# Patient Record
Sex: FEMALE | Race: WHITE | NOT HISPANIC OR LATINO | Employment: UNEMPLOYED | URBAN - METROPOLITAN AREA
[De-identification: names, ages, dates, MRNs, and addresses within clinical notes are randomized per-mention and may not be internally consistent; named-entity substitution may affect disease eponyms.]

---

## 2017-01-12 ENCOUNTER — OFFICE VISIT (OUTPATIENT)
Dept: NEUROLOGY | Facility: CLINIC | Age: 57
End: 2017-01-12

## 2017-01-12 VITALS
BODY MASS INDEX: 39.05 KG/M2 | WEIGHT: 248.8 LBS | DIASTOLIC BLOOD PRESSURE: 74 MMHG | HEIGHT: 67 IN | HEART RATE: 76 BPM | OXYGEN SATURATION: 95 % | SYSTOLIC BLOOD PRESSURE: 122 MMHG

## 2017-01-12 DIAGNOSIS — G35 MULTIPLE SCLEROSIS (HCC): Primary | ICD-10-CM

## 2017-01-12 DIAGNOSIS — F33.41 RECURRENT MAJOR DEPRESSIVE DISORDER, IN PARTIAL REMISSION (HCC): ICD-10-CM

## 2017-01-12 PROCEDURE — 99213 OFFICE O/P EST LOW 20 MIN: CPT | Performed by: PSYCHIATRY & NEUROLOGY

## 2017-01-12 NOTE — MR AVS SNAPSHOT
Christiane Pinkd   1/12/2017 2:00 PM   Office Visit    Dept Phone:  [phone]   Encounter #:  [id number]    Provider:  Yousuf Zelaya MD   Department:  [facility] NEUROLOGY                Your Full Care Plan              Today's Medication Changes          These changes are accurate as of: 1/12/17  2:18 PM.  If you have any questions, ask your nurse or doctor.               Stop taking medication(s)listed here:     predniSONE 20 MG tablet   Commonly known as:  DELTASONE                      Your Updated Medication List          This list is accurate as of: 1/12/17  2:18 PM.  Always use your most recent med list.                * buPROPion  MG 24 hr tablet   Commonly known as:  WELLBUTRIN XL   Take 1 tablet by mouth daily.       * buPROPion  MG 24 hr tablet   Commonly known as:  WELLBUTRIN XL   Take 1 tablet by mouth every morning. Take one tablet by mouth daily.       gabapentin 400 MG capsule   Commonly known as:  NEURONTIN   Take 2 capsules by mouth 3 (three) times a day.       GILENYA 0.5 MG capsule   Generic drug:  fingolimod       metFORMIN 500 MG tablet   Commonly known as:  GLUCOPHAGE       Omeprazole 20 MG tablet delayed-release       rizatriptan 10 MG tablet   Commonly known as:  MAXALT       simvastatin 20 MG tablet   Commonly known as:  ZOCOR       valsartan-hydrochlorothiazide 160-25 MG per tablet   Commonly known as:  DIOVAN-HCT       vitamin D 76197 UNITS capsule capsule   Commonly known as:  ERGOCALCIFEROL   Take 1 capsule by mouth every 7 days. Take 1 capsule weekly.       * Notice:  This list has 2 medication(s) that are the same as other medications prescribed for you. Read the directions carefully, and ask your doctor or other care provider to review them with you.            You Were Diagnosed With        Codes Comments    Multiple sclerosis    -  Primary ICD-10-CM: G35  ICD-9-CM: 340     Recurrent major depressive disorder, in partial  "remission     ICD-10-CM: F33.41  ICD-9-CM: 296.35       Instructions     None    Patient Instructions History      Upcoming Appointments     Visit Type Date Time Department    FOLLOW UP 2017  2:00 PM [facility]      Oyster Signup     [facility] Oyster allows you to send messages to your doctor, view your test results, renew your prescriptions, schedule appointments, and more. To sign up, go to [employer] and click on the Sign Up Now link in the New User? box. Enter your Oyster Activation Code exactly as it appears below along with the last four digits of your Social Security Number and your Date of Birth () to complete the sign-up process. If you do not sign up before the expiration date, you must request a new code.    Oyster Activation Code: [email or web address]  Expires: 2017  2:18 PM    If you have questions, you can email [email or web address] or call [phone] to talk to our Oyster staff. Remember, Oyster is NOT to be used for urgent needs. For medical emergencies, dial 911.               Other Info from Your Visit           Allergies     No Known Allergies      Reason for Visit     Multiple Sclerosis           Vital Signs     Blood Pressure Pulse Height Weight Oxygen Saturation Body Mass Index    122/74 76 67\" (170.2 cm) 248 lb 12.8 oz (113 kg) 95% 38.97 kg/m2    Smoking Status                   Never Smoker           Problems and Diagnoses Noted     Depression    Multiple sclerosis        "

## 2017-01-12 NOTE — PROGRESS NOTES
"Subjective:     Patient ID: Christiane Figueroa is a 56 y.o. female.    History of Present Illness     57 yo woman with RRMS, and MDD returns in follow up.  Last visit 7/5/16 continued Gilenya.      CBC, CMP - NCS    RRMS    Notes mild cognitive changes.  Fatigue is moderate.  Walking for exercise.  Right leg weakness has improved but still present.    Did not start PT/OT/SLP at [facility].    Taking Gilenya since 7/2014.     Gabapentin helps with nighttime sx of restless legs and sharp shooting pains.    MDD    Mood stable on Wellbutrin.      The following portions of the patient's history were reviewed and updated as appropriate: allergies, current medications, past medical history, past surgical history and problem list.    Review of Systems   Constitutional: Negative for activity change and unexpected weight change.   HENT: Positive for trouble swallowing. Negative for tinnitus.    Eyes: Negative for photophobia and visual disturbance.   Respiratory: Negative for apnea and choking.    Cardiovascular: Negative for leg swelling.   Endocrine: Positive for heat intolerance. Negative for cold intolerance.   Genitourinary: Positive for frequency and urgency. Negative for difficulty urinating and menstrual problem.   Musculoskeletal: Negative for back pain and gait problem.   Skin: Negative for color change.   Allergic/Immunologic: Negative for immunocompromised state.   Neurological: Positive for tremors and light-headedness. Negative for dizziness, seizures, syncope, facial asymmetry and speech difficulty.   Hematological: Negative for adenopathy. Does not bruise/bleed easily.   Psychiatric/Behavioral: Positive for decreased concentration and dysphoric mood. Negative for behavioral problems, confusion, hallucinations and sleep disturbance.        Objective:  Vitals:    01/12/17 1351   BP: 122/74   Pulse: 76   SpO2: 95%   Weight: 248 lb 12.8 oz (113 kg)   Height: 67\" (170.2 cm)       Neurologic Exam "     Mental Status   Oriented to person, place, and time.   Follows 3 step commands.   Attention: decreased. Concentration: decreased.   Speech: speech is normal   Level of consciousness: alert  Knowledge: good and consistent with education. Unable to perform simple calculations.   Able to name object. Able to read. Able to repeat. Able to write. Normal comprehension.     Cranial Nerves     CN II   Visual fields full to confrontation.   Visual acuity: normal  Right visual field deficit: none  Left visual field deficit: none     CN III, IV, VI   Nystagmus: none   Diplopia: none  Ophthalmoparesis: none  Upgaze: normal  Downgaze: normal  Conjugate gaze: present    CN V   Facial sensation intact.   Right corneal reflex: normal  Left corneal reflex: normal    CN VII   Right facial weakness: none  Left facial weakness: none    CN VIII   Hearing: intact    CN IX, X   Palate: symmetric  Right gag reflex: normal  Left gag reflex: normal    CN XI   Right sternocleidomastoid strength: normal  Left sternocleidomastoid strength: normal    CN XII   Tongue: not atrophic  Fasciculations: absent  Tongue deviation: none    Motor Exam   Muscle bulk: normal  Overall muscle tone: increased  Right arm tone: normal  Left arm tone: normal  Right leg tone: increased  Left leg tone: increased    Strength   Strength 5/5 except as noted.   Left deltoid: 4/5  Left biceps: 4/5  Left triceps: 4/5  Left wrist flexion: 4/5  Left wrist extension: 4/5  Right iliopsoas: 4/5  Left iliopsoas: 4/5  Right quadriceps: 4/5  Left quadriceps: 4/5  Right hamstrin/5  Left hamstrin/5  Right glutei: 4/5  Left glutei: 4/5  Right anterior tibial: 4/5  Left anterior tibial: 4/5  Right posterior tibial: 4/5  Left posterior tibial: 4/5  Right peroneal: 4/5  Left peroneal: 4/5  Right gastroc: 4/5  Left gastroc: 4/5    Sensory Exam   Light touch normal.     Gait, Coordination, and Reflexes     Gait  Gait: shuffling and wide-based    Tremor   Resting tremor:  absent  Intention tremor: present  Action tremor: left arm and right arm      Physical Exam   Constitutional: She is oriented to person, place, and time.   Neurological: She is oriented to person, place, and time.   Psychiatric: Her speech is normal.       Assessment/Plan:       Problems Addressed this Visit        Nervous and Auditory    Multiple sclerosis - Primary     Stable on Gilenya                 Other    Depression     Psychological condition is improving with treatment.  Continue current treatment regimen.  Psychological condition  will be reassessed at the next regular appointment.

## 2017-01-12 NOTE — ASSESSMENT & PLAN NOTE
Psychological condition is improving with treatment.  Continue current treatment regimen.  Psychological condition  will be reassessed at the next regular appointment.

## 2017-02-08 RX ORDER — ERGOCALCIFEROL 1.25 MG/1
50000 CAPSULE ORAL
Qty: 12 CAPSULE | Refills: 1 | Status: SHIPPED | OUTPATIENT
Start: 2017-02-08 | End: 2017-08-10 | Stop reason: SDUPTHER

## 2017-07-21 ENCOUNTER — OFFICE VISIT (OUTPATIENT)
Dept: NEUROLOGY | Facility: CLINIC | Age: 57
End: 2017-07-21

## 2017-07-21 ENCOUNTER — APPOINTMENT (OUTPATIENT)
Dept: LAB | Facility: HOSPITAL | Age: 57
End: 2017-07-21

## 2017-07-21 VITALS
OXYGEN SATURATION: 97 % | SYSTOLIC BLOOD PRESSURE: 124 MMHG | WEIGHT: 230.6 LBS | HEIGHT: 67 IN | BODY MASS INDEX: 36.19 KG/M2 | HEART RATE: 66 BPM | DIASTOLIC BLOOD PRESSURE: 72 MMHG

## 2017-07-21 DIAGNOSIS — F33.41 RECURRENT MAJOR DEPRESSIVE DISORDER, IN PARTIAL REMISSION (HCC): ICD-10-CM

## 2017-07-21 DIAGNOSIS — G35 MULTIPLE SCLEROSIS (HCC): Primary | ICD-10-CM

## 2017-07-21 PROCEDURE — 99214 OFFICE O/P EST MOD 30 MIN: CPT | Performed by: PSYCHIATRY & NEUROLOGY

## 2017-07-21 RX ORDER — GABAPENTIN 400 MG/1
400 CAPSULE ORAL 3 TIMES DAILY
COMMUNITY
End: 2017-07-21 | Stop reason: SDUPTHER

## 2017-07-21 RX ORDER — BUPROPION HYDROCHLORIDE 150 MG/1
150 TABLET ORAL EVERY MORNING
Qty: 30 TABLET | Refills: 11 | Status: SHIPPED | OUTPATIENT
Start: 2017-07-21 | End: 2017-11-07 | Stop reason: SDUPTHER

## 2017-07-21 RX ORDER — GABAPENTIN 400 MG/1
400 CAPSULE ORAL 3 TIMES DAILY
Qty: 90 CAPSULE | Refills: 5 | Status: SHIPPED | OUTPATIENT
Start: 2017-07-21 | End: 2018-03-06

## 2017-07-21 RX ORDER — BUPROPION HYDROCHLORIDE 300 MG/1
300 TABLET ORAL DAILY
Qty: 30 TABLET | Refills: 11 | Status: SHIPPED | OUTPATIENT
Start: 2017-07-21 | End: 2017-11-07 | Stop reason: SDUPTHER

## 2017-07-21 NOTE — ASSESSMENT & PLAN NOTE
Right sided weakness and unsteady gait is worsening.  Recommend starting PT    MRI Brain   CBC, CMP   mg TID

## 2017-07-21 NOTE — PROGRESS NOTES
Subjective:     Patient ID: Christiane Figueroa is a 57 y.o. female.    History of Present Illness     57 y.o. woman with RRMS, and MDD returns in follow up.  Last visit 1/12/17 continued Gilenya.      MRI Brain/cervical - 7/5/16 - right frontal, right centrum semiovale PVWM, scattered T2 lesions no change from 3/14/13; C5/6 DDD    CBC, CMP - NCS    RRMS    MSFC reveals decrease in 9 hole peg on right and right LE weakness.  Several falls in last 6 months.  Did not start PT.  Fatigue is severe.  Heat intolerance is severe.    Lost 18 lbs by avoiding carbs.  Requires frequent breaks every 15 minutes if up on feet.      Taking Gilenya since 7/2014.    Legs are aching and hurting at night.    MDD    Mood is good on Wellbutrin.      The following portions of the patient's history were reviewed and updated as appropriate: allergies, current medications, past medical history, past surgical history and problem list.    Review of Systems   Constitutional: Negative for activity change and unexpected weight change.   HENT: Positive for trouble swallowing. Negative for tinnitus.    Eyes: Negative for photophobia and visual disturbance.   Respiratory: Negative for apnea and choking.    Cardiovascular: Negative for leg swelling.   Endocrine: Positive for heat intolerance. Negative for cold intolerance.   Genitourinary: Positive for frequency and urgency. Negative for difficulty urinating and menstrual problem.   Musculoskeletal: Negative for back pain and gait problem.   Skin: Negative for color change.   Allergic/Immunologic: Negative for immunocompromised state.   Neurological: Positive for tremors and light-headedness. Negative for dizziness, seizures, syncope, facial asymmetry and speech difficulty.   Hematological: Negative for adenopathy. Does not bruise/bleed easily.   Psychiatric/Behavioral: Positive for decreased concentration and dysphoric mood. Negative for behavioral problems, confusion, hallucinations and sleep  "disturbance.        Objective:  Vitals:    07/21/17 1110   BP: 124/72   BP Location: Right arm   Patient Position: Sitting   Cuff Size: Adult   Pulse: 66   SpO2: 97%   Weight: 230 lb 9.6 oz (105 kg)   Height: 67\" (170.2 cm)       Neurologic Exam     Mental Status   Oriented to person, place, and time.   Follows 3 step commands.   Attention: decreased. Concentration: decreased.   Speech: speech is normal   Level of consciousness: alert  Knowledge: good and consistent with education. Unable to perform simple calculations.   Able to name object. Able to read. Able to repeat. Able to write. Normal comprehension.     Cranial Nerves     CN II   Visual fields full to confrontation.   Visual acuity: normal  Right visual field deficit: none  Left visual field deficit: none     CN III, IV, VI   Nystagmus: none   Diplopia: none  Ophthalmoparesis: none  Upgaze: normal  Downgaze: normal  Conjugate gaze: present    CN V   Facial sensation intact.   Right corneal reflex: normal  Left corneal reflex: normal    CN VII   Right facial weakness: none  Left facial weakness: none    CN VIII   Hearing: intact    CN IX, X   Palate: symmetric  Right gag reflex: normal  Left gag reflex: normal    CN XI   Right sternocleidomastoid strength: normal  Left sternocleidomastoid strength: normal    CN XII   Tongue: not atrophic  Fasciculations: absent  Tongue deviation: none    Motor Exam   Muscle bulk: normal  Overall muscle tone: increased  Right arm tone: normal  Left arm tone: normal  Right leg tone: increased  Left leg tone: increased    Strength   Strength 5/5 throughout.   Right neck flexion: 5/5  Left neck flexion: 5/5  Right neck extension: 5/5  Left neck extension: 5/5  Right deltoid: 5/5  Left deltoid: 5/5  Right biceps: 5/5  Left biceps: 5/5  Right triceps: 5/5  Left triceps: 5/5  Right wrist flexion: 5/5  Left wrist flexion: 5/5  Right wrist extension: 5/5  Left wrist extension: 5/5  Right interossei: 5/5  Left interossei: 5/5  Right " abdominals: 5/5  Left abdominals: 5/5  Right iliopsoas: 4/5  Left iliopsoas: 5/5  Right quadriceps: 4/5  Left quadriceps: 5/5  Right hamstrin/5  Left hamstrin/5  Right glutei: 4/5  Left glutei: 5/5  Right anterior tibial: 4/5  Left anterior tibial: 5/5  Right posterior tibial: 4/5  Left posterior tibial: 5/5  Right peroneal: 4/5  Left peroneal: 5/5  Right gastroc: 4/5  Left gastroc: 5/5    Sensory Exam   Light touch normal.     Gait, Coordination, and Reflexes     Gait  Gait: shuffling and wide-based    Tremor   Resting tremor: absent  Intention tremor: present  Action tremor: left arm and right arm      Physical Exam   Constitutional: She is oriented to person, place, and time.   Neurological: She is oriented to person, place, and time. She has normal strength.   Psychiatric: Her speech is normal.       Assessment/Plan:       Problems Addressed this Visit        Nervous and Auditory    Multiple sclerosis - Primary     Right sided weakness and unsteady gait is worsening.  Recommend starting PT    MRI Brain   CBC, CMP   mg TID         Relevant Orders    MRI Brain With & Without Contrast    CBC & Differential    Comprehensive Metabolic Panel       Other    Depression     Psychological condition is unchanged.  Continue current treatment regimen.  Psychological condition  will be reassessed at the next regular appointment.         Relevant Medications    buPROPion XL (WELLBUTRIN XL) 150 MG 24 hr tablet    buPROPion XL (WELLBUTRIN XL) 300 MG 24 hr tablet

## 2017-08-02 LAB
ALBUMIN SERPL-MCNC: 4.4 G/DL (ref 3.2–4.8)
ALBUMIN/GLOB SERPL: 1.6 G/DL
ALP SERPL-CCNC: 79 U/L (ref 25–100)
ALT SERPL-CCNC: 35 U/L (ref 7–40)
AST SERPL-CCNC: 28 U/L (ref 0–33)
BASOPHILS # BLD AUTO: 0.02 10E3/MM3 (ref 0–0.2)
BASOPHILS NFR BLD AUTO: 0.6 % (ref 0–1)
BILIRUB SERPL-MCNC: 0.3 MG/DL (ref 0.3–1.2)
BUN SERPL-MCNC: 12 MG/DL (ref 9–23)
BUN/CREAT SERPL: 20 (ref 7–25)
CALCIUM SERPL-MCNC: 10.8 MG/DL (ref 8.7–10.4)
CHLORIDE SERPL-SCNC: 100 MMOL/L (ref 99–109)
CO2 SERPL-SCNC: 26 MMOL/L (ref 20–31)
CREAT SERPL-MCNC: 0.6 MG/DL (ref 0.6–1.3)
EOSINOPHIL # BLD AUTO: 0.12 10E3/MM3 (ref 0.1–0.3)
EOSINOPHIL NFR BLD AUTO: 3.7 % (ref 0–3)
ERYTHROCYTE [DISTWIDTH] IN BLOOD BY AUTOMATED COUNT: 13.3 % (ref 11.3–14.5)
GLOBULIN SER CALC-MCNC: 2.8 G/DL
GLUCOSE SERPL-MCNC: 116 MG/DL (ref 70–100)
HCT VFR BLD AUTO: 41.5 % (ref 34.5–44)
HGB BLD-MCNC: 13.3 G/DL (ref 11.5–15.5)
IMM GRANULOCYTES # BLD: 0.01 10E3/MM3 (ref 0–0.03)
IMM GRANULOCYTES NFR BLD: 0.3 % (ref 0–0.6)
LYMPHOCYTES # BLD AUTO: 0.31 10E3/MM3 (ref 0.6–4.8)
LYMPHOCYTES NFR BLD AUTO: 9.7 % (ref 24–44)
MCH RBC QN AUTO: 30.4 PG (ref 27–31)
MCHC RBC AUTO-ENTMCNC: 32 G/DL (ref 32–36)
MCV RBC AUTO: 95 FL (ref 80–99)
MONOCYTES # BLD AUTO: 0.38 10E3/MM3 (ref 0–1)
MONOCYTES NFR BLD AUTO: 11.8 % (ref 0–12)
NEUTROPHILS # BLD AUTO: 2.37 10E3/MM3 (ref 1.5–8.3)
NEUTROPHILS NFR BLD AUTO: 73.9 % (ref 41–71)
PLATELET # BLD AUTO: 242 10E3/MM3 (ref 150–450)
POTASSIUM SERPL-SCNC: 3.8 MMOL/L (ref 3.5–5.5)
PROT SERPL-MCNC: 7.2 G/DL (ref 5.7–8.2)
RBC # BLD AUTO: 4.37 10E6/MM3 (ref 3.89–5.14)
SODIUM SERPL-SCNC: 137 MMOL/L (ref 132–146)
WBC # BLD AUTO: 3.21 10E3/MM3 (ref 3.5–10.8)

## 2017-08-07 ENCOUNTER — TELEPHONE (OUTPATIENT)
Dept: NEUROLOGY | Facility: CLINIC | Age: 57
End: 2017-08-07

## 2017-08-07 NOTE — TELEPHONE ENCOUNTER
----- Message from Edel Munguia sent at 8/7/2017  9:29 AM EDT -----  Regarding: script request  Contact: [phone]  Patient request script to help her relax during MRI

## 2017-08-11 RX ORDER — ERGOCALCIFEROL 1.25 MG/1
CAPSULE ORAL
Qty: 12 CAPSULE | Refills: 0 | Status: SHIPPED | OUTPATIENT
Start: 2017-08-11 | End: 2017-11-07 | Stop reason: SDUPTHER

## 2017-08-31 ENCOUNTER — OFFICE VISIT (OUTPATIENT)
Dept: NEUROLOGY | Facility: CLINIC | Age: 57
End: 2017-08-31

## 2017-08-31 ENCOUNTER — HOSPITAL ENCOUNTER (OUTPATIENT)
Dept: MRI IMAGING | Facility: HOSPITAL | Age: 57
Discharge: HOME | End: 2017-08-31
Attending: PSYCHIATRY & NEUROLOGY | Admitting: PSYCHIATRY & NEUROLOGY

## 2017-08-31 VITALS
SYSTOLIC BLOOD PRESSURE: 130 MMHG | OXYGEN SATURATION: 99 % | DIASTOLIC BLOOD PRESSURE: 78 MMHG | HEART RATE: 71 BPM | WEIGHT: 232 LBS | HEIGHT: 67 IN | BODY MASS INDEX: 36.41 KG/M2

## 2017-08-31 DIAGNOSIS — F33.41 RECURRENT MAJOR DEPRESSIVE DISORDER, IN PARTIAL REMISSION (HCC): ICD-10-CM

## 2017-08-31 DIAGNOSIS — G35 MULTIPLE SCLEROSIS (HCC): Primary | ICD-10-CM

## 2017-08-31 PROCEDURE — A9577 INJ MULTIHANCE: HCPCS | Performed by: PSYCHIATRY & NEUROLOGY

## 2017-08-31 PROCEDURE — 0 GADOBENATE DIMEGLUMINE 529 MG/ML SOLUTION: Performed by: PSYCHIATRY & NEUROLOGY

## 2017-08-31 PROCEDURE — 70553 MRI BRAIN STEM W/O & W/DYE: CPT

## 2017-08-31 PROCEDURE — 99214 OFFICE O/P EST MOD 30 MIN: CPT | Performed by: PSYCHIATRY & NEUROLOGY

## 2017-08-31 RX ORDER — LAMOTRIGINE 25 MG/1
TABLET ORAL
Qty: 120 TABLET | Refills: 2 | Status: SHIPPED | OUTPATIENT
Start: 2017-08-31 | End: 2018-03-06

## 2017-08-31 RX ADMIN — GADOBENATE DIMEGLUMINE 20 ML: 529 INJECTION, SOLUTION INTRAVENOUS at 09:58

## 2017-11-07 RX ORDER — BUPROPION HYDROCHLORIDE 150 MG/1
150 TABLET ORAL EVERY MORNING
Qty: 90 TABLET | Refills: 3 | Status: SHIPPED | OUTPATIENT
Start: 2017-11-07 | End: 2019-06-11 | Stop reason: SDUPTHER

## 2017-11-07 RX ORDER — ERGOCALCIFEROL 1.25 MG/1
50000 CAPSULE ORAL
Qty: 36 CAPSULE | Refills: 2 | Status: SHIPPED | OUTPATIENT
Start: 2017-11-07 | End: 2018-12-04 | Stop reason: SDUPTHER

## 2017-11-07 RX ORDER — BUPROPION HYDROCHLORIDE 300 MG/1
300 TABLET ORAL DAILY
Qty: 90 TABLET | Refills: 3 | Status: SHIPPED | OUTPATIENT
Start: 2017-11-07 | End: 2019-06-11 | Stop reason: SDUPTHER

## 2018-03-06 ENCOUNTER — LAB REQUISITION (OUTPATIENT)
Dept: LAB | Facility: HOSPITAL | Age: 58
End: 2018-03-06

## 2018-03-06 ENCOUNTER — OFFICE VISIT (OUTPATIENT)
Dept: NEUROLOGY | Facility: CLINIC | Age: 58
End: 2018-03-06

## 2018-03-06 ENCOUNTER — APPOINTMENT (OUTPATIENT)
Dept: LAB | Facility: HOSPITAL | Age: 58
End: 2018-03-06

## 2018-03-06 VITALS
DIASTOLIC BLOOD PRESSURE: 94 MMHG | RESPIRATION RATE: 16 BRPM | OXYGEN SATURATION: 98 % | WEIGHT: 236 LBS | HEART RATE: 75 BPM | SYSTOLIC BLOOD PRESSURE: 160 MMHG | BODY MASS INDEX: 37.04 KG/M2 | HEIGHT: 67 IN

## 2018-03-06 DIAGNOSIS — G35 MULTIPLE SCLEROSIS (HCC): Primary | ICD-10-CM

## 2018-03-06 DIAGNOSIS — F33.41 RECURRENT MAJOR DEPRESSIVE DISORDER, IN PARTIAL REMISSION (HCC): ICD-10-CM

## 2018-03-06 DIAGNOSIS — N32.81 OAB (OVERACTIVE BLADDER): ICD-10-CM

## 2018-03-06 DIAGNOSIS — Z00.00 ROUTINE GENERAL MEDICAL EXAMINATION AT A HEALTH CARE FACILITY: ICD-10-CM

## 2018-03-06 LAB
ALBUMIN SERPL-MCNC: 4.7 G/DL (ref 3.2–4.8)
ALBUMIN SERPL-MCNC: 4.7 G/DL (ref 3.2–4.8)
ALBUMIN/GLOB SERPL: 1.7 G/DL
ALBUMIN/GLOB SERPL: 1.7 G/DL (ref 1.5–2.5)
ALP SERPL-CCNC: 69 U/L (ref 25–100)
ALP SERPL-CCNC: 69 U/L (ref 25–100)
ALT SERPL W P-5'-P-CCNC: 26 U/L (ref 7–40)
ALT SERPL-CCNC: 26 U/L (ref 7–40)
ANION GAP SERPL CALCULATED.3IONS-SCNC: 9 MMOL/L (ref 3–11)
AST SERPL-CCNC: 23 U/L (ref 0–33)
AST SERPL-CCNC: 23 U/L (ref 0–33)
BASOPHILS # BLD AUTO: 0.02 10*3/MM3 (ref 0–0.2)
BASOPHILS # BLD AUTO: 0.02 10E3/MM3 (ref 0–0.2)
BASOPHILS NFR BLD AUTO: 0.4 % (ref 0–1)
BASOPHILS NFR BLD AUTO: 0.4 % (ref 0–1)
BILIRUB SERPL-MCNC: 0.2 MG/DL (ref 0.3–1.2)
BILIRUB SERPL-MCNC: 0.2 MG/DL (ref 0.3–1.2)
BUN BLD-MCNC: 15 MG/DL (ref 9–23)
BUN SERPL-MCNC: 15 MG/DL (ref 9–23)
BUN/CREAT SERPL: 21.4 (ref 7–25)
BUN/CREAT SERPL: 21.4 (ref 7–25)
CALCIUM SERPL-MCNC: 9.7 MG/DL (ref 8.7–10.4)
CALCIUM SPEC-SCNC: 9.7 MG/DL (ref 8.7–10.4)
CHLORIDE SERPL-SCNC: 102 MMOL/L (ref 99–109)
CHLORIDE SERPL-SCNC: 102 MMOL/L (ref 99–109)
CO2 SERPL-SCNC: 30 MMOL/L (ref 20–31)
CO2 SERPL-SCNC: 30 MMOL/L (ref 20–31)
CREAT BLD-MCNC: 0.7 MG/DL (ref 0.6–1.3)
CREAT SERPL-MCNC: 0.7 MG/DL (ref 0.6–1.3)
DEPRECATED RDW RBC AUTO: 41.2 FL (ref 37–54)
EOSINOPHIL # BLD AUTO: 0.15 10*3/MM3 (ref 0–0.3)
EOSINOPHIL # BLD AUTO: 0.15 10E3/MM3 (ref 0.1–0.3)
EOSINOPHIL NFR BLD AUTO: 3.3 % (ref 0–3)
EOSINOPHIL NFR BLD AUTO: 3.3 % (ref 0–3)
ERYTHROCYTE [DISTWIDTH] IN BLOOD BY AUTOMATED COUNT: 12.8 % (ref 11.3–14.5)
ERYTHROCYTE [DISTWIDTH] IN BLOOD BY AUTOMATED COUNT: 12.8 % (ref 11.3–14.5)
GFR SERPL CREATININE-BSD FRML MDRD: 86 ML/MIN/1.73
GFR SERPLBLD CREATININE-BSD FMLA CKD-EPI: 86 ML/MIN/1.732
GLOBULIN SER CALC-MCNC: 2.8 G/DL
GLOBULIN UR ELPH-MCNC: 2.8 GM/DL
GLUCOSE BLD-MCNC: 129 MG/DL (ref 70–100)
GLUCOSE SERPL-MCNC: 129 MG/DL (ref 70–100)
HCT VFR BLD AUTO: 41.6 % (ref 34.5–44)
HCT VFR BLD AUTO: 41.6 % (ref 34.5–44)
HGB BLD-MCNC: 14.2 G/DL (ref 11.5–15.5)
HGB BLD-MCNC: 14.2 G/DL (ref 11.5–15.5)
IMM GRANULOCYTES # BLD: 0.01 10*3/MM3 (ref 0–0.03)
IMM GRANULOCYTES # BLD: 0.01 10E3/MM3 (ref 0–0.03)
IMM GRANULOCYTES NFR BLD: 0.2 % (ref 0–0.6)
IMM GRANULOCYTES NFR BLD: 0.2 % (ref 0–0.6)
LYMPHOCYTES # BLD AUTO: 0.19 10*3/MM3 (ref 0.6–4.8)
LYMPHOCYTES # BLD AUTO: 0.19 10E3/MM3 (ref 0.6–4.8)
LYMPHOCYTES NFR BLD AUTO: 4.2 % (ref 24–44)
LYMPHOCYTES NFR BLD AUTO: 4.2 % (ref 24–44)
MCH RBC QN AUTO: 30.5 PG (ref 27–31)
MCH RBC QN AUTO: 30.5 PG (ref 27–31)
MCHC RBC AUTO-ENTMCNC: 34.1 G/DL (ref 32–36)
MCHC RBC AUTO-ENTMCNC: 34.1 G/DL (ref 32–36)
MCV RBC AUTO: 89.5 FL (ref 80–99)
MCV RBC AUTO: 89.5 FL (ref 80–99)
MONOCYTES # BLD AUTO: 0.63 10*3/MM3 (ref 0–1)
MONOCYTES # BLD AUTO: 0.63 10E3/MM3 (ref 0–1)
MONOCYTES NFR BLD AUTO: 14.1 % (ref 0–12)
MONOCYTES NFR BLD AUTO: 14.1 % (ref 0–12)
NEUTROPHILS # BLD AUTO: 3.48 10*3/MM3 (ref 1.5–8.3)
NEUTROPHILS # BLD AUTO: 3.48 10E3/MM3 (ref 1.5–8.3)
NEUTROPHILS NFR BLD AUTO: 77.8 % (ref 41–71)
NEUTROPHILS NFR BLD AUTO: 77.8 % (ref 41–71)
PLATELET # BLD AUTO: 248 10*3/MM3 (ref 150–450)
PLATELET # BLD AUTO: 248 10E3/MM3 (ref 150–450)
PMV BLD AUTO: 11.6 FL (ref 6–12)
POTASSIUM BLD-SCNC: 4 MMOL/L (ref 3.5–5.5)
POTASSIUM SERPL-SCNC: 4 MMOL/L (ref 3.5–5.5)
PROT SERPL-MCNC: 7.5 G/DL (ref 5.7–8.2)
PROT SERPL-MCNC: 7.5 G/DL (ref 5.7–8.2)
RBC # BLD AUTO: 4.65 10*6/MM3 (ref 3.89–5.14)
RBC # BLD AUTO: 4.65 10E6/MM3 (ref 3.89–5.14)
SODIUM BLD-SCNC: 141 MMOL/L (ref 132–146)
SODIUM SERPL-SCNC: 141 MMOL/L (ref 132–146)
WBC # BLD AUTO: 4.48 10E3/MM3 (ref 3.5–10.8)
WBC NRBC COR # BLD: 4.48 10*3/MM3 (ref 3.5–10.8)

## 2018-03-06 PROCEDURE — 99214 OFFICE O/P EST MOD 30 MIN: CPT | Performed by: PSYCHIATRY & NEUROLOGY

## 2018-03-06 PROCEDURE — 36415 COLL VENOUS BLD VENIPUNCTURE: CPT | Performed by: PSYCHIATRY & NEUROLOGY

## 2018-03-06 PROCEDURE — 85025 COMPLETE CBC W/AUTO DIFF WBC: CPT | Performed by: PSYCHIATRY & NEUROLOGY

## 2018-03-06 PROCEDURE — 80053 COMPREHEN METABOLIC PANEL: CPT | Performed by: PSYCHIATRY & NEUROLOGY

## 2018-03-06 RX ORDER — GABAPENTIN 300 MG/1
600 CAPSULE ORAL 3 TIMES DAILY
Qty: 180 CAPSULE | Refills: 5 | Status: SHIPPED | OUTPATIENT
Start: 2018-03-06 | End: 2019-10-04 | Stop reason: SDUPTHER

## 2018-03-06 RX ORDER — MIRTAZAPINE 30 MG/1
30 TABLET, FILM COATED ORAL NIGHTLY
Qty: 30 TABLET | Refills: 2 | Status: SHIPPED | OUTPATIENT
Start: 2018-03-06 | End: 2019-03-06

## 2018-03-06 RX ORDER — OXYBUTYNIN CHLORIDE 10 MG/1
10 TABLET, EXTENDED RELEASE ORAL DAILY
Qty: 30 TABLET | Refills: 3 | Status: SHIPPED | OUTPATIENT
Start: 2018-03-06 | End: 2018-08-10

## 2018-03-06 NOTE — PROGRESS NOTES
Subjective:   Chief Complaint   Patient presents with   • Multiple Sclerosis       Patient ID: Christiane Figueroa is a 57 y.o. female.    History of Present Illness        57 y.o. woman with RRMS, and MDD returns in follow up.  Last visit 8/31/17 continued Gilenya, started LTG.     MRI Brain, 8/31/17 - no new/enlarging/enhancing lesions since 6/17/16;  right frontal, right centrum semiovale PVWM, scattered T2 lesions      7/21/17:  CBC, CMP - NCS    RRMS    Fatigue is severe.  Requires frequent breaks every 15 minutes if up on feet.    Right side of face felt hot and swollen.      Taking Gilenya since 7/2014.    Legs continue aching and hurting at night.   mg TID of modest benefit.     MDD    Starting in [place] has had trouble sleeping and staying in house.  Anxious about leaving house.  Trial of LTG for a month but stopped due to upset stomach.         The following portions of the patient's history were reviewed and updated as appropriate: allergies, current medications, past medical history, past surgical history and problem list.    Review of Systems   Constitutional: Negative for activity change and unexpected weight change.   HENT: Positive for trouble swallowing. Negative for tinnitus.    Eyes: Negative for photophobia and visual disturbance.   Respiratory: Negative for apnea and choking.    Cardiovascular: Negative for leg swelling.   Endocrine: Positive for heat intolerance. Negative for cold intolerance.   Genitourinary: Positive for frequency and urgency. Negative for difficulty urinating and menstrual problem.   Musculoskeletal: Negative for back pain and gait problem.   Skin: Negative for color change.   Allergic/Immunologic: Negative for immunocompromised state.   Neurological: Positive for tremors and light-headedness. Negative for dizziness, seizures, syncope, facial asymmetry and speech difficulty.   Hematological: Negative for adenopathy. Does not bruise/bleed easily.  "  Psychiatric/Behavioral: Positive for decreased concentration and dysphoric mood. Negative for behavioral problems, confusion, hallucinations and sleep disturbance.        Objective:  Vitals:    03/06/18 1130   BP: 160/94   BP Location: Right arm   Patient Position: Sitting   Cuff Size: Adult   Pulse: 75   Resp: 16   SpO2: 98%   Weight: 107 kg (236 lb)   Height: 170.2 cm (67\")       Neurologic Exam     Mental Status   Oriented to person, place, and time.   Follows 3 step commands.   Attention: decreased. Concentration: decreased.   Speech: speech is normal   Level of consciousness: alert  Knowledge: good and consistent with education. Unable to perform simple calculations.   Able to name object. Able to read. Able to repeat. Able to write. Normal comprehension.     Cranial Nerves     CN II   Visual fields full to confrontation.   Visual acuity: normal  Right visual field deficit: none  Left visual field deficit: none     CN III, IV, VI   Nystagmus: none   Diplopia: none  Ophthalmoparesis: none  Upgaze: normal  Downgaze: normal  Conjugate gaze: present    CN V   Facial sensation intact.   Right corneal reflex: normal  Left corneal reflex: normal    CN VII   Right facial weakness: none  Left facial weakness: none    CN VIII   Hearing: intact    CN IX, X   Palate: symmetric  Right gag reflex: normal  Left gag reflex: normal    CN XI   Right sternocleidomastoid strength: normal  Left sternocleidomastoid strength: normal    CN XII   Tongue: not atrophic  Fasciculations: absent  Tongue deviation: none    Motor Exam   Muscle bulk: normal  Overall muscle tone: increased  Right arm tone: normal  Left arm tone: normal  Right leg tone: increased  Left leg tone: increased    Strength   Strength 5/5 throughout.   Right neck flexion: 5/5  Left neck flexion: 5/5  Right neck extension: 5/5  Left neck extension: 5/5  Right deltoid: 5/5  Left deltoid: 5/5  Right biceps: 5/5  Left biceps: 5/5  Right triceps: 5/5  Left triceps: " 5/5  Right wrist flexion: 5/5  Left wrist flexion: 5/5  Right wrist extension: 5/5  Left wrist extension: 5/5  Right interossei: 5/5  Left interossei: 5/5  Right abdominals: 5/5  Left abdominals: 5/5  Right iliopsoas: 4/5  Left iliopsoas: 5/5  Right quadriceps: 4/5  Left quadriceps: 5/5  Right hamstrin/5  Left hamstrin/5  Right glutei: 4/5  Left glutei: 5/5  Right anterior tibial: 4/5  Left anterior tibial: 5/5  Right posterior tibial: 4/5  Left posterior tibial: 5/5  Right peroneal: 4/5  Left peroneal: 5/5  Right gastroc: 4/5  Left gastroc: 5/5    Sensory Exam   Light touch normal.     Gait, Coordination, and Reflexes     Gait  Gait: shuffling and wide-based    Tremor   Resting tremor: absent  Intention tremor: present  Action tremor: left arm and right arm      Physical Exam   Constitutional: She is oriented to person, place, and time.   Neurological: She is oriented to person, place, and time. She has normal strength.   Psychiatric: Her speech is normal.     Lab Requisition on 2017   Component Date Value Ref Range Status   • Glucose 2017 116* 70 - 100 mg/dL Final   • BUN 2017 12  9 - 23 mg/dL Final   • Creatinine 2017 0.60  0.60 - 1.30 mg/dL Final   • Sodium 2017 137  132 - 146 mmol/L Final   • Potassium 2017 3.8  3.5 - 5.5 mmol/L Final   • Chloride 2017 100  99 - 109 mmol/L Final   • CO2 2017 26.0  20.0 - 31.0 mmol/L Final   • Calcium 2017 10.8* 8.7 - 10.4 mg/dL Final   • Total Protein 2017 7.2  5.7 - 8.2 g/dL Final   • Albumin 2017 4.40  3.20 - 4.80 g/dL Final   • ALT (SGPT) 2017 35  7 - 40 U/L Final   • AST (SGOT) 2017 28  0 - 33 U/L Final   • Alkaline Phosphatase 2017 79  25 - 100 U/L Final   • Total Bilirubin 2017 0.3  0.3 - 1.2 mg/dL Final   • eGFR Non African Amer 2017 103  >60 mL/min/1.73 Final   • Globulin 2017 2.8  gm/dL Final   • A/G Ratio 2017 1.6  1.5 - 2.5 g/dL Final   • BUN/Creatinine  Ratio 07/21/2017 20.0  7.0 - 25.0 Final   • Anion Gap 07/21/2017 11.0  3.0 - 11.0 mmol/L Final   • WBC 07/21/2017 3.21* 3.50 - 10.80 10*3/mm3 Final   • RBC 07/21/2017 4.37  3.89 - 5.14 10*6/mm3 Final   • Hemoglobin 07/21/2017 13.3  11.5 - 15.5 g/dL Final   • Hematocrit 07/21/2017 41.5  34.5 - 44.0 % Final   • MCV 07/21/2017 95.0  80.0 - 99.0 fL Final   • MCH 07/21/2017 30.4  27.0 - 31.0 pg Final   • MCHC 07/21/2017 32.0  32.0 - 36.0 g/dL Final   • RDW 07/21/2017 13.3  11.3 - 14.5 % Final   • RDW-SD 07/21/2017 46.5  37.0 - 54.0 fl Final   • MPV 07/21/2017 11.6  6.0 - 12.0 fL Final   • Platelets 07/21/2017 242  150 - 450 10*3/mm3 Final   • Neutrophil % 07/21/2017 73.9* 41.0 - 71.0 % Final   • Lymphocyte % 07/21/2017 9.7* 24.0 - 44.0 % Final   • Monocyte % 07/21/2017 11.8  0.0 - 12.0 % Final   • Eosinophil % 07/21/2017 3.7* 0.0 - 3.0 % Final   • Basophil % 07/21/2017 0.6  0.0 - 1.0 % Final   • Immature Grans % 07/21/2017 0.3  0.0 - 0.6 % Final   • Neutrophils, Absolute 07/21/2017 2.37  1.50 - 8.30 10*3/mm3 Final   • Lymphocytes, Absolute 07/21/2017 0.31* 0.60 - 4.80 10*3/mm3 Final   • Monocytes, Absolute 07/21/2017 0.38  0.00 - 1.00 10*3/mm3 Final   • Eosinophils, Absolute 07/21/2017 0.12  0.00 - 0.30 10*3/mm3 Final   • Basophils, Absolute 07/21/2017 0.02  0.00 - 0.20 10*3/mm3 Final   • Immature Grans, Absolute 07/21/2017 0.01  0.00 - 0.03 10*3/mm3 Final       Assessment/Plan:       Problems Addressed this Visit        Nervous and Auditory    Multiple sclerosis - Primary     Stable sx on Gilenya    CBC,CMP   Increase  mg TID for worsening leg pains         Relevant Orders    CBC & Differential    Comprehensive Metabolic Panel       Musculoskeletal and Integument    OAB (overactive bladder)     Start Oxybutynin          Relevant Medications    oxybutynin XL (DITROPAN XL) 10 MG 24 hr tablet       Other    Depression     Psychological condition is worsening.  Medication changes per orders.  Psychological condition   will be reassessed at the next regular appointment     Debilitating anxiety    Continue Welbutrin and add Remeron .         Relevant Medications    mirtazapine (REMERON) 30 MG tablet

## 2018-06-26 ENCOUNTER — OFFICE VISIT (OUTPATIENT)
Dept: NEUROLOGY | Facility: CLINIC | Age: 58
End: 2018-06-26

## 2018-06-26 ENCOUNTER — LAB REQUISITION (OUTPATIENT)
Dept: LAB | Facility: HOSPITAL | Age: 58
End: 2018-06-26

## 2018-06-26 VITALS
RESPIRATION RATE: 18 BRPM | DIASTOLIC BLOOD PRESSURE: 90 MMHG | SYSTOLIC BLOOD PRESSURE: 144 MMHG | OXYGEN SATURATION: 99 % | HEIGHT: 67 IN | WEIGHT: 242 LBS | BODY MASS INDEX: 37.98 KG/M2 | HEART RATE: 74 BPM

## 2018-06-26 DIAGNOSIS — G35 MULTIPLE SCLEROSIS (HCC): Primary | ICD-10-CM

## 2018-06-26 DIAGNOSIS — N32.81 OAB (OVERACTIVE BLADDER): ICD-10-CM

## 2018-06-26 DIAGNOSIS — Z00.00 ROUTINE GENERAL MEDICAL EXAMINATION AT A HEALTH CARE FACILITY: ICD-10-CM

## 2018-06-26 DIAGNOSIS — F33.41 RECURRENT MAJOR DEPRESSIVE DISORDER, IN PARTIAL REMISSION (HCC): ICD-10-CM

## 2018-06-26 PROCEDURE — 36415 COLL VENOUS BLD VENIPUNCTURE: CPT | Performed by: PSYCHIATRY & NEUROLOGY

## 2018-06-26 PROCEDURE — 99214 OFFICE O/P EST MOD 30 MIN: CPT | Performed by: PSYCHIATRY & NEUROLOGY

## 2018-06-26 NOTE — PROGRESS NOTES
Subjective:   Chief Complaint   Patient presents with   • Multiple Sclerosis       Patient ID: Christiane Figueroa is a 58 y.o. female.    History of Present Illness      58 y.o. woman with RRMS, OAB and MDD returns in follow up.  Last visit 3/6/18 continued Gilenya, increased GBP, started oxybutynin and remeron, ordered labs.    MRI Brain, 8/31/17 - no new/enlarging/enhancing lesions since 6/17/16;  right frontal, right centrum semiovale PVWM, scattered T2 lesions      3/7/18:  CBC, CMP - NCS    RRMS    Eye appt discovered OS macular edema.  Denies visual changes.  Stopped Gilenya on 6/14/18.  Leg pains are controlled and decreased  mg TID. Requires frequent breaks every 15 minutes if up on feet.  Right side of face felt hot and swollen.  Right hip aches and hurts.     Previous DMD:  Rebif, Tecfidera. Gileya.    Legs continue aching and hurting at night.   mg TID of modest benefit.     MDD    Started on Remeron and able to go outside of house.  Going on vacation in July.  Mood is stable.  Continues on Wellbutrin 450 mg daily, Remeron.        OAB    Taking oxybutynin improves frequency and urgency.      Past Medical History:   Diagnosis Date   • Anxiety    • Arthritis    • Depression    • Diabetes mellitus    • Fatigue    • Hypertension    • Limb swelling     BLE, left leg worse   • LOM (loss of memory)    • Multiple sclerosis    • Pain, joint, shoulder     Bilateral   • Vision problems        The following portions of the patient's history were reviewed and updated as appropriate: allergies, current medications, past medical history, past surgical history and problem list.    Review of Systems   Constitutional: Negative for activity change and unexpected weight change.   HENT: Positive for trouble swallowing. Negative for tinnitus.    Eyes: Negative for photophobia and visual disturbance.   Respiratory: Negative for apnea and choking.    Cardiovascular: Negative for leg swelling.   Endocrine: Positive for  "heat intolerance. Negative for cold intolerance.   Genitourinary: Positive for frequency and urgency. Negative for difficulty urinating and menstrual problem.   Musculoskeletal: Negative for back pain and gait problem.   Skin: Negative for color change.   Allergic/Immunologic: Negative for immunocompromised state.   Neurological: Positive for tremors and light-headedness. Negative for dizziness, seizures, syncope, facial asymmetry and speech difficulty.   Hematological: Negative for adenopathy. Does not bruise/bleed easily.   Psychiatric/Behavioral: Positive for decreased concentration and dysphoric mood. Negative for behavioral problems, confusion, hallucinations and sleep disturbance.        Objective:  Vitals:    06/26/18 1037   BP: 144/90   BP Location: Right arm   Patient Position: Sitting   Cuff Size: Adult   Pulse: 74   Resp: 18   SpO2: 99%   Weight: 110 kg (242 lb)   Height: 170.2 cm (67\")       Neurologic Exam     Mental Status   Oriented to person, place, and time.   Follows 3 step commands.   Attention: decreased. Concentration: decreased.   Speech: speech is normal   Level of consciousness: alert  Knowledge: good and consistent with education. Unable to perform simple calculations.   Able to name object. Able to read. Able to repeat. Able to write. Normal comprehension.     Cranial Nerves     CN II   Visual fields full to confrontation.   Visual acuity: normal  Right visual field deficit: none  Left visual field deficit: none     CN III, IV, VI   Nystagmus: none   Diplopia: none  Ophthalmoparesis: none  Upgaze: normal  Downgaze: normal  Conjugate gaze: present    CN V   Facial sensation intact.   Right corneal reflex: normal  Left corneal reflex: normal    CN VII   Right facial weakness: none  Left facial weakness: none    CN VIII   Hearing: intact    CN IX, X   Palate: symmetric  Right gag reflex: normal  Left gag reflex: normal    CN XI   Right sternocleidomastoid strength: normal  Left " sternocleidomastoid strength: normal    CN XII   Tongue: not atrophic  Fasciculations: absent  Tongue deviation: none    Motor Exam   Muscle bulk: normal  Overall muscle tone: increased  Right arm tone: normal  Left arm tone: normal  Right leg tone: increased  Left leg tone: increased    Strength   Strength 5/5 throughout.   Right neck flexion: 5/5  Left neck flexion: 5/5  Right neck extension: 5/5  Left neck extension: 5/5  Right deltoid: 5/5  Left deltoid: 5/5  Right biceps: 5/5  Left biceps: 5/5  Right triceps: 5/5  Left triceps: 5/5  Right wrist flexion: 5/5  Left wrist flexion: 5/5  Right wrist extension: 5/5  Left wrist extension: 5/5  Right interossei: 5/5  Left interossei: 5/5  Right abdominals: 5/5  Left abdominals: 5/5  Right iliopsoas: 4/5  Left iliopsoas: 5/5  Right quadriceps: 4/5  Left quadriceps: 5/5  Right hamstrin/5  Left hamstrin/5  Right glutei: 4/5  Left glutei: 5/5  Right anterior tibial: 4/5  Left anterior tibial: 5/5  Right posterior tibial: 4/5  Left posterior tibial: 5/5  Right peroneal: 4/5  Left peroneal: 5/5  Right gastroc: 4/5  Left gastroc: 5/5    Sensory Exam   Light touch normal.     Gait, Coordination, and Reflexes     Gait  Gait: shuffling and wide-based    Tremor   Resting tremor: absent  Intention tremor: present  Action tremor: left arm and right arm      Physical Exam   Constitutional: She is oriented to person, place, and time.   Neurological: She is oriented to person, place, and time. She has normal strength.   Psychiatric: Her speech is normal.     Orders Only on 2018   Component Date Value Ref Range Status   • WBC 2018 4.48  3.50 - 10.80 10E3/mm3 Final   • RBC 2018 4.65  3.89 - 5.14 10E6/mm3 Final   • Hemoglobin 2018 14.2  11.5 - 15.5 g/dL Final   • Hematocrit 2018 41.6  34.5 - 44.0 % Final   • MCV 2018 89.5  80.0 - 99.0 fL Final   • MCH 2018 30.5  27.0 - 31.0 pg Final   • MCHC 2018 34.1  32.0 - 36.0 g/dL Final   • RDW  03/06/2018 12.8  11.3 - 14.5 % Final   • Platelets 03/06/2018 248  150 - 450 10E3/mm3 Final   • Neutrophil Rel % 03/06/2018 77.8* 41.0 - 71.0 % Final   • Lymphocyte Rel % 03/06/2018 4.2* 24.0 - 44.0 % Final   • Monocyte Rel % 03/06/2018 14.1* 0.0 - 12.0 % Final   • Eosinophil Rel % 03/06/2018 3.3* 0.0 - 3.0 % Final   • Basophil Rel % 03/06/2018 0.4  0.0 - 1.0 % Final   • Neutrophils Absolute 03/06/2018 3.48  1.50 - 8.30 10E3/mm3 Final   • Lymphocytes Absolute 03/06/2018 0.19* 0.60 - 4.80 10E3/mm3 Final   • Monocytes Absolute 03/06/2018 0.63  0.00 - 1.00 10E3/mm3 Final   • Eosinophils Absolute 03/06/2018 0.15  0.10 - 0.30 10E3/mm3 Final   • Basophils Absolute 03/06/2018 0.02  0.00 - 0.20 10E3/mm3 Final   • Immature Granulocyte Rel % 03/06/2018 0.2  0.0 - 0.6 % Final   • Immature Grans Absolute 03/06/2018 0.01  0.00 - 0.03 10E3/mm3 Final   • Glucose 03/06/2018 129* 70 - 100 mg/dL Final   • BUN 03/06/2018 15  9 - 23 mg/dL Final   • Creatinine 03/06/2018 0.70  0.60 - 1.30 mg/dL Final   • eGFR Non  Am 03/06/2018 86  mL/min/1.732 Final    Comment:                                 --------------------------------------                                  [employer] Guidelines                                  Stage      Description           GFR                                  1          Normal or High        90+                                  2          Mild decrease         60-89                                  3          Moderate decrease     30-59                                  4          Severe decrease       15-29                                  5          Kidney failure        <15     • BUN/Creatinine Ratio 03/06/2018 21.4  7.0 - 25.0 Final   • Sodium 03/06/2018 141  132 - 146 mmol/L Final   • Potassium 03/06/2018 4.0  3.5 - 5.5 mmol/L Final   • Chloride 03/06/2018 102  99 - 109 mmol/L Final   • Total CO2 03/06/2018 30.0  20.0 - 31.0 mmol/L Final   • Calcium 03/06/2018 9.7  8.7 - 10.4 mg/dL  Final   • Total Protein 03/06/2018 7.5  5.7 - 8.2 g/dL Final   • Albumin 03/06/2018 4.7  3.2 - 4.8 g/dL Final   • Globulin 03/06/2018 2.8  g/dL Final   • A/G Ratio 03/06/2018 1.7  g/dL Final   • Total Bilirubin 03/06/2018 0.2* 0.3 - 1.2 mg/dL Final   • Alkaline Phosphatase 03/06/2018 69  25 - 100 U/L Final   • AST (SGOT) 03/06/2018 23  0 - 33 U/L Final   • ALT (SGPT) 03/06/2018 26  7 - 40 U/L Final       Assessment/Plan:       Problems Addressed this Visit        Nervous and Auditory    Multiple sclerosis - Primary     Developed macular edema on Gilenya    Stopped Gilenya    Start Aubagio    MRI Brain and labs          Relevant Orders    CBC & Differential    Comprehensive Metabolic Panel    QuantiFERON TB Client Incubated    MRI Brain Without Contrast       Musculoskeletal and Integument    OAB (overactive bladder)     Sx improved on oxybutynin             Other    Depression     Psychological condition is improving with treatment.  Continue current treatment regimen.  Psychological condition  will be reassessed at the next regular appointment.

## 2018-07-01 ENCOUNTER — RESULTS ENCOUNTER (OUTPATIENT)
Dept: NEUROLOGY | Facility: CLINIC | Age: 58
End: 2018-07-01

## 2018-07-01 DIAGNOSIS — G35 MULTIPLE SCLEROSIS (HCC): ICD-10-CM

## 2018-07-01 LAB
ALBUMIN SERPL-MCNC: 4.7 G/DL (ref 3.5–5.5)
ALBUMIN/GLOB SERPL: 2 {RATIO} (ref 1.2–2.2)
ALP SERPL-CCNC: 69 IU/L (ref 39–117)
ALT SERPL-CCNC: 25 IU/L (ref 0–32)
ANNOTATION COMMENT IMP: ABNORMAL
AST SERPL-CCNC: 23 IU/L (ref 0–40)
BASOPHILS # BLD AUTO: 0 X10E3/UL (ref 0–0.2)
BASOPHILS NFR BLD AUTO: 1 %
BILIRUB SERPL-MCNC: <0.2 MG/DL (ref 0–1.2)
BUN SERPL-MCNC: 12 MG/DL (ref 6–24)
BUN/CREAT SERPL: 16 (ref 9–23)
CALCIUM SERPL-MCNC: 9.7 MG/DL (ref 8.7–10.2)
CHLORIDE SERPL-SCNC: 98 MMOL/L (ref 96–106)
CO2 SERPL-SCNC: 27 MMOL/L (ref 20–29)
CREAT SERPL-MCNC: 0.75 MG/DL (ref 0.57–1)
EOSINOPHIL # BLD AUTO: 0.2 X10E3/UL (ref 0–0.4)
EOSINOPHIL NFR BLD AUTO: 3 %
ERYTHROCYTE [DISTWIDTH] IN BLOOD BY AUTOMATED COUNT: 13.6 % (ref 12.3–15.4)
GAMMA INTERFERON BACKGROUND BLD IA-ACNC: 0.03 IU/ML
GLOBULIN SER CALC-MCNC: 2.4 G/DL (ref 1.5–4.5)
GLUCOSE SERPL-MCNC: 130 MG/DL (ref 65–99)
HCT VFR BLD AUTO: 41.2 % (ref 34–46.6)
HGB BLD-MCNC: 14 G/DL (ref 11.1–15.9)
IMM GRANULOCYTES # BLD: 0 X10E3/UL (ref 0–0.1)
IMM GRANULOCYTES NFR BLD: 0 %
LYMPHOCYTES # BLD AUTO: 0.6 X10E3/UL (ref 0.7–3.1)
LYMPHOCYTES NFR BLD AUTO: 12 %
Lab: NORMAL
M TB IFN-G BLD-IMP: ABNORMAL
M TB IFN-G CD4+ BCKGRND COR BLD-ACNC: <0 IU/ML
M TB IFN-G CD4+ T-CELLS BLD-ACNC: 0.02 IU/ML
MCH RBC QN AUTO: 30.9 PG (ref 26.6–33)
MCHC RBC AUTO-ENTMCNC: 34 G/DL (ref 31.5–35.7)
MCV RBC AUTO: 91 FL (ref 79–97)
MITOGEN IGNF BLD-ACNC: 0.07 IU/ML
MONOCYTES # BLD AUTO: 0.3 X10E3/UL (ref 0.1–0.9)
MONOCYTES NFR BLD AUTO: 6 %
NEUTROPHILS # BLD AUTO: 3.7 X10E3/UL (ref 1.4–7)
NEUTROPHILS NFR BLD AUTO: 78 %
PLATELET # BLD AUTO: 251 X10E3/UL (ref 150–379)
POTASSIUM SERPL-SCNC: 3.9 MMOL/L (ref 3.5–5.2)
PROT SERPL-MCNC: 7.1 G/DL (ref 6–8.5)
RBC # BLD AUTO: 4.53 X10E6/UL (ref 3.77–5.28)
SERVICE CMNT-IMP: ABNORMAL
SODIUM SERPL-SCNC: 142 MMOL/L (ref 134–144)
WBC # BLD AUTO: 4.7 X10E3/UL (ref 3.4–10.8)

## 2018-07-20 ENCOUNTER — TELEPHONE (OUTPATIENT)
Dept: NEUROLOGY | Facility: CLINIC | Age: 58
End: 2018-07-20

## 2018-07-20 ENCOUNTER — APPOINTMENT (OUTPATIENT)
Dept: MRI IMAGING | Facility: HOSPITAL | Age: 58
End: 2018-07-20
Attending: PSYCHIATRY & NEUROLOGY

## 2018-07-20 NOTE — TELEPHONE ENCOUNTER
----- Message from Edel Munguia sent at 7/20/2018  3:00 PM EDT -----  Regarding: MEDICATION REQUEST  Contact: [phone]  STEFANO VARGAS    Please send in valim to help patient real during MRI

## 2018-07-23 ENCOUNTER — APPOINTMENT (OUTPATIENT)
Dept: MRI IMAGING | Facility: HOSPITAL | Age: 58
End: 2018-07-23
Attending: PSYCHIATRY & NEUROLOGY

## 2018-07-23 ENCOUNTER — HOSPITAL ENCOUNTER (OUTPATIENT)
Dept: MRI IMAGING | Facility: HOSPITAL | Age: 58
Discharge: HOME | End: 2018-07-23
Attending: PSYCHIATRY & NEUROLOGY | Admitting: PSYCHIATRY & NEUROLOGY

## 2018-07-23 DIAGNOSIS — G35 MULTIPLE SCLEROSIS (HCC): ICD-10-CM

## 2018-07-23 PROCEDURE — 70551 MRI BRAIN STEM W/O DYE: CPT

## 2018-07-23 RX ORDER — ALPRAZOLAM 1 MG/1
TABLET ORAL
Qty: 2 TABLET | Refills: 0 | Status: SHIPPED | OUTPATIENT
Start: 2018-07-23 | End: 2019-04-12

## 2018-07-24 NOTE — TELEPHONE ENCOUNTER
Called patient, spoke with the patient, patient is aware of the medication being sent to the pharmacy. Patient stated that she had found an old script with 1 pill in the bottle and took that medication. Stated that it did then trick and was able to get the MRI completed. I apologized to the patient about the delay.

## 2018-07-25 ENCOUNTER — APPOINTMENT (OUTPATIENT)
Dept: MRI IMAGING | Facility: HOSPITAL | Age: 58
End: 2018-07-25
Attending: PSYCHIATRY & NEUROLOGY

## 2018-08-10 ENCOUNTER — LAB REQUISITION (OUTPATIENT)
Dept: LAB | Facility: HOSPITAL | Age: 58
End: 2018-08-10

## 2018-08-10 ENCOUNTER — OFFICE VISIT (OUTPATIENT)
Dept: NEUROLOGY | Facility: CLINIC | Age: 58
End: 2018-08-10

## 2018-08-10 VITALS
HEIGHT: 67 IN | SYSTOLIC BLOOD PRESSURE: 138 MMHG | DIASTOLIC BLOOD PRESSURE: 86 MMHG | HEART RATE: 86 BPM | OXYGEN SATURATION: 98 %

## 2018-08-10 DIAGNOSIS — Z00.00 ROUTINE GENERAL MEDICAL EXAMINATION AT A HEALTH CARE FACILITY: ICD-10-CM

## 2018-08-10 DIAGNOSIS — F33.41 RECURRENT MAJOR DEPRESSIVE DISORDER, IN PARTIAL REMISSION (HCC): ICD-10-CM

## 2018-08-10 DIAGNOSIS — G35 MULTIPLE SCLEROSIS (HCC): Primary | ICD-10-CM

## 2018-08-10 DIAGNOSIS — N32.81 OAB (OVERACTIVE BLADDER): ICD-10-CM

## 2018-08-10 PROCEDURE — 99214 OFFICE O/P EST MOD 30 MIN: CPT | Performed by: PSYCHIATRY & NEUROLOGY

## 2018-08-10 PROCEDURE — 36415 COLL VENOUS BLD VENIPUNCTURE: CPT | Performed by: PSYCHIATRY & NEUROLOGY

## 2018-08-10 RX ORDER — LOSARTAN POTASSIUM AND HYDROCHLOROTHIAZIDE 12.5; 5 MG/1; MG/1
1 TABLET ORAL DAILY
COMMUNITY
End: 2020-08-21

## 2018-08-10 NOTE — PROGRESS NOTES
Subjective:   Chief Complaint   Patient presents with   • Multiple Sclerosis     mri f/u       Patient ID: Christiane Figueroa is a 58 y.o. female.    History of Present Illness      58 y.o. woman with RRMS, OAB and MDD returns in follow up.  Last visit 6/26/18 stopped Gilenya, started Aubagio, ordered MRI Brain and labs.    MRI Brain, my review of films, 7/23/18 as compared to 8/31/17 - no new/enlarging/enhancing lesions since 6/17/16;  right frontal, right centrum semiovale PVWM, scattered T2 lesions      6/28/18:  CBC, CMP, TB - NCS    RRMS     Increased energy after starting on Aubagio.  Fatigue is mild to moderate.      Leg pains are controlled on  mg TID.  Requires frequent breaks every 15 minutes if up on feet.  Right side of face felt hot and swollen.  Right hip aches and hurts.     Previous DMD:  Rebif, Tecfidera. Gilenya(macular edema).     MDD     Remeron and Wellbutrin 450 mg daily improved mood.        OAB     Stopped oxybutynin as not controlling frequency and urgency.      Past Medical History:   Diagnosis Date   • Anxiety    • Arthritis    • Depression    • Diabetes mellitus (CMS/HCC)    • Fatigue    • Hypertension    • Limb swelling     BLE, left leg worse   • LOM (loss of memory)    • Multiple sclerosis (CMS/HCC)    • Pain, joint, shoulder     Bilateral   • Vision problems        The following portions of the patient's history were reviewed and updated as appropriate: allergies, current medications, past medical history, past surgical history and problem list.    Review of Systems   Constitutional: Positive for fatigue. Negative for activity change and unexpected weight change.   HENT: Positive for trouble swallowing. Negative for tinnitus.    Eyes: Negative for photophobia and visual disturbance.   Respiratory: Negative for apnea and choking.    Cardiovascular: Negative for leg swelling.   Endocrine: Positive for heat intolerance. Negative for cold intolerance.   Genitourinary: Positive for  "frequency and urgency. Negative for difficulty urinating and menstrual problem.   Musculoskeletal: Negative for back pain and gait problem.   Skin: Negative for color change.   Allergic/Immunologic: Negative for immunocompromised state.   Neurological: Positive for tremors and light-headedness. Negative for dizziness, seizures, syncope, facial asymmetry and speech difficulty.   Hematological: Negative for adenopathy. Does not bruise/bleed easily.   Psychiatric/Behavioral: Positive for decreased concentration and dysphoric mood. Negative for behavioral problems, confusion, hallucinations and sleep disturbance.        Objective:  Vitals:    08/10/18 1427   BP: 138/86   Pulse: 86   SpO2: 98%   Height: 170.2 cm (67\")       Neurologic Exam     Mental Status   Oriented to person, place, and time.   Follows 3 step commands.   Attention: decreased. Concentration: decreased.   Speech: speech is normal   Level of consciousness: alert  Knowledge: good and consistent with education. Unable to perform simple calculations.   Able to name object. Able to read. Able to repeat. Able to write. Normal comprehension.     Cranial Nerves     CN II   Visual fields full to confrontation.   Visual acuity: normal  Right visual field deficit: none  Left visual field deficit: none     CN III, IV, VI   Nystagmus: none   Diplopia: none  Ophthalmoparesis: none  Upgaze: normal  Downgaze: normal  Conjugate gaze: present    CN V   Facial sensation intact.   Right corneal reflex: normal  Left corneal reflex: normal    CN VII   Right facial weakness: none  Left facial weakness: none    CN VIII   Hearing: intact    CN IX, X   Palate: symmetric  Right gag reflex: normal  Left gag reflex: normal    CN XI   Right sternocleidomastoid strength: normal  Left sternocleidomastoid strength: normal    CN XII   Tongue: not atrophic  Fasciculations: absent  Tongue deviation: none    Motor Exam   Muscle bulk: normal  Overall muscle tone: increased  Right arm tone: " normal  Left arm tone: normal  Right leg tone: increased  Left leg tone: increased    Strength   Strength 5/5 throughout.   Right neck flexion: 5/5  Left neck flexion: 5/5  Right neck extension: 5/5  Left neck extension: 5/5  Right deltoid: 5/5  Left deltoid: 5/5  Right biceps: 5/5  Left biceps: 5/5  Right triceps: 5/5  Left triceps: 5/5  Right wrist flexion: 5/5  Left wrist flexion: 5/5  Right wrist extension: 5/5  Left wrist extension: 5/5  Right interossei: 5/5  Left interossei: 5/5  Right abdominals: 5/5  Left abdominals: 5/5  Right iliopsoas: 4/5  Left iliopsoas: 5/5  Right quadriceps: 4/5  Left quadriceps: 5/5  Right hamstrin/5  Left hamstrin/5  Right glutei: 4/5  Left glutei: 5/5  Right anterior tibial: 4/5  Left anterior tibial: 5/5  Right posterior tibial: 4/5  Left posterior tibial: 5/5  Right peroneal: 4/5  Left peroneal: 5/5  Right gastroc: 4/5  Left gastroc: 5/5    Sensory Exam   Light touch normal.     Gait, Coordination, and Reflexes     Gait  Gait: shuffling and wide-based    Tremor   Resting tremor: absent  Intention tremor: present  Action tremor: left arm and right arm      Physical Exam   Constitutional: She is oriented to person, place, and time.   Neurological: She is oriented to person, place, and time. She has normal strength.   Psychiatric: Her speech is normal.     Orders Only on 2018   Component Date Value Ref Range Status   • WBC 2018 4.7  3.4 - 10.8 x10E3/uL Final   • RBC 2018 4.53  3.77 - 5.28 x10E6/uL Final   • Hemoglobin 2018 14.0  11.1 - 15.9 g/dL Final   • Hematocrit 2018 41.2  34.0 - 46.6 % Final   • MCV 2018 91  79 - 97 fL Final   • MCH 2018 30.9  26.6 - 33.0 pg Final   • MCHC 2018 34.0  31.5 - 35.7 g/dL Final   • RDW 2018 13.6  12.3 - 15.4 % Final   • Platelets 2018 251  150 - 379 x10E3/uL Final   • Neutrophil Rel % 2018 78  Not Estab. % Final   • Lymphocyte Rel % 2018 12  Not Estab. % Final   •  Monocyte Rel % 06/27/2018 6  Not Estab. % Final   • Eosinophil Rel % 06/27/2018 3  Not Estab. % Final   • Basophil Rel % 06/27/2018 1  Not Estab. % Final   • Neutrophils Absolute 06/27/2018 3.7  1.4 - 7.0 x10E3/uL Final   • Lymphocytes Absolute 06/27/2018 0.6* 0.7 - 3.1 x10E3/uL Final   • Monocytes Absolute 06/27/2018 0.3  0.1 - 0.9 x10E3/uL Final   • Eosinophils Absolute 06/27/2018 0.2  0.0 - 0.4 x10E3/uL Final   • Basophils Absolute 06/27/2018 0.0  0.0 - 0.2 x10E3/uL Final   • Immature Granulocyte Rel % 06/27/2018 0  Not Estab. % Final   • Immature Grans Absolute 06/27/2018 0.0  0.0 - 0.1 x10E3/uL Final   • Glucose 06/27/2018 130* 65 - 99 mg/dL Final   • BUN 06/27/2018 12  6 - 24 mg/dL Final   • Creatinine 06/27/2018 0.75  0.57 - 1.00 mg/dL Final   • eGFR Non  Am 06/27/2018 88  >59 mL/min/1.73 Final   • eGFR African Am 06/27/2018 101  >59 mL/min/1.73 Final   • BUN/Creatinine Ratio 06/27/2018 16  9 - 23 Final   • Sodium 06/27/2018 142  134 - 144 mmol/L Final   • Potassium 06/27/2018 3.9  3.5 - 5.2 mmol/L Final   • Chloride 06/27/2018 98  96 - 106 mmol/L Final   • Total CO2 06/27/2018 27  20 - 29 mmol/L Final                  **Please note reference interval change**   • Calcium 06/27/2018 9.7  8.7 - 10.2 mg/dL Final   • Total Protein 06/27/2018 7.1  6.0 - 8.5 g/dL Final   • Albumin 06/27/2018 4.7  3.5 - 5.5 g/dL Final   • Globulin 06/27/2018 2.4  1.5 - 4.5 g/dL Final   • A/G Ratio 06/27/2018 2.0  1.2 - 2.2 Final   • Total Bilirubin 06/27/2018 <0.2  0.0 - 1.2 mg/dL Final   • Alkaline Phosphatase 06/27/2018 69  39 - 117 IU/L Final   • AST (SGOT) 06/27/2018 23  0 - 40 IU/L Final   • ALT (SGPT) 06/27/2018 25  0 - 32 IU/L Final   • QuantiFERON-TB Gold In Tube 06/27/2018 Indeterminate* Negative Final    Comment: Mitogen (positive control) gave low response.  This may indicate anergy or immune suppression. Early draws and  extended transit time may also result in low positive control and  indeterminate  results.  The specimen received for QuantiFERON testing was incubated by the  ordering institution.  Specific procedures outlined in our Directory  of Services and in the package insert for the QuantiFERON Gold  (In Tube) test must be followed to enable for proper stimulation of  cells for the production of interferon gamma.     • QuantiFERON Criteria 06/27/2018 Comment   Final    Comment: To be considered positive a specimen should have a TB Ag minus Nil  value greater than or equal to 0.35 IU/mL and in addition the TB Ag  minus Nil value must be greater than or equal to 25% of the Nil  value. There may be insufficient information in these values to  differentiate between some negative and some indeterminate test  values.     • QuantiFERON TB Ag Value 06/27/2018 0.02  IU/mL Final   • QuantiFERON Nil Value 06/27/2018 0.03  IU/mL Final   • QuantiFERON Mitogen Value 06/27/2018 0.07  IU/mL Final   • QFT TB Ag minus Nil Value 06/27/2018 <0.00  IU/mL Final   • Interpretation 06/27/2018 Comment   Final    Comment: The QuantiFERON TB Gold (in Tube) assay is intended for use as an aid  in the diagnosis of TB infection. Negative results suggest that there  is no TB infection. In patients with high suspicion of exposure, a  negative test should be repeated. A positive test indicates infection  with Mycobacterium tuberculosis. Among individuals without  tuberculosis infection, a positive test may be due to exposure to  M. kansasii, M. szulgai or M. marinum. On the Internet, go to  cdc.gov/tb for further details.     • Please note 06/27/2018 Comment   Final    Comment: The date and/or time of collection was not indicated on the  requisition as required by state and federal law.  The date of  receipt of the specimen was used as the collection date if not  supplied.         Assessment/Plan:       Problems Addressed this Visit        Nervous and Auditory    Multiple sclerosis (CMS/HCC) - Primary     Fatigue improving on  Nora    CBC,CMP    Continue  gm TID for neuropathic pain in legs             Musculoskeletal and Integument    OAB (overactive bladder)     Stop oxybutynin due to lack of efficacy             Other    Depression     Psychological condition is unchanged.  Continue current treatment regimen.  Psychological condition  will be reassessed at the next regular appointment.

## 2018-08-17 LAB
ALBUMIN SERPL-MCNC: 4.3 G/DL (ref 3.5–5.5)
ALBUMIN/GLOB SERPL: 1.7 {RATIO} (ref 1.2–2.2)
ALP SERPL-CCNC: 71 IU/L (ref 39–117)
ALT SERPL-CCNC: 28 IU/L (ref 0–32)
ANNOTATION COMMENT IMP: ABNORMAL
AST SERPL-CCNC: 23 IU/L (ref 0–40)
BASOPHILS # BLD AUTO: 0 X10E3/UL (ref 0–0.2)
BASOPHILS NFR BLD AUTO: 1 %
BILIRUB SERPL-MCNC: <0.2 MG/DL (ref 0–1.2)
BUN SERPL-MCNC: 12 MG/DL (ref 6–24)
BUN/CREAT SERPL: 21 (ref 9–23)
CALCIUM SERPL-MCNC: 9.6 MG/DL (ref 8.7–10.2)
CHLORIDE SERPL-SCNC: 101 MMOL/L (ref 96–106)
CO2 SERPL-SCNC: 27 MMOL/L (ref 20–29)
CREAT SERPL-MCNC: 0.58 MG/DL (ref 0.57–1)
EOSINOPHIL # BLD AUTO: 0.2 X10E3/UL (ref 0–0.4)
EOSINOPHIL NFR BLD AUTO: 4 %
ERYTHROCYTE [DISTWIDTH] IN BLOOD BY AUTOMATED COUNT: 12.7 % (ref 12.3–15.4)
GAMMA INTERFERON BACKGROUND BLD IA-ACNC: 0.08 IU/ML
GLOBULIN SER CALC-MCNC: 2.6 G/DL (ref 1.5–4.5)
GLUCOSE SERPL-MCNC: 224 MG/DL (ref 65–99)
HCT VFR BLD AUTO: 40.3 % (ref 34–46.6)
HGB BLD-MCNC: 13 G/DL (ref 11.1–15.9)
IMM GRANULOCYTES # BLD: 0 X10E3/UL (ref 0–0.1)
IMM GRANULOCYTES NFR BLD: 0 %
LYMPHOCYTES # BLD AUTO: 0.7 X10E3/UL (ref 0.7–3.1)
LYMPHOCYTES NFR BLD AUTO: 20 %
M TB IFN-G BLD-IMP: ABNORMAL
M TB IFN-G CD4+ BCKGRND COR BLD-ACNC: <0 IU/ML
M TB IFN-G CD4+ T-CELLS BLD-ACNC: 0.05 IU/ML
MCH RBC QN AUTO: 29.3 PG (ref 26.6–33)
MCHC RBC AUTO-ENTMCNC: 32.3 G/DL (ref 31.5–35.7)
MCV RBC AUTO: 91 FL (ref 79–97)
MITOGEN IGNF BLD-ACNC: 0.18 IU/ML
MONOCYTES # BLD AUTO: 0.5 X10E3/UL (ref 0.1–0.9)
MONOCYTES NFR BLD AUTO: 14 %
NEUTROPHILS # BLD AUTO: 2.1 X10E3/UL (ref 1.4–7)
NEUTROPHILS NFR BLD AUTO: 61 %
PLATELET # BLD AUTO: 209 X10E3/UL (ref 150–379)
POTASSIUM SERPL-SCNC: 4 MMOL/L (ref 3.5–5.2)
PROT SERPL-MCNC: 6.9 G/DL (ref 6–8.5)
RBC # BLD AUTO: 4.43 X10E6/UL (ref 3.77–5.28)
SERVICE CMNT-IMP: ABNORMAL
SODIUM SERPL-SCNC: 143 MMOL/L (ref 134–144)
WBC # BLD AUTO: 3.4 X10E3/UL (ref 3.4–10.8)

## 2018-08-20 ENCOUNTER — TELEPHONE (OUTPATIENT)
Dept: NEUROLOGY | Facility: CLINIC | Age: 58
End: 2018-08-20

## 2018-08-20 NOTE — TELEPHONE ENCOUNTER
----- Message from Edel SALINAS Nilda sent at 8/20/2018  1:09 PM EDT -----  Regarding: SAMPLES  Contact: [phone]  Patient request aubagio samples mailed to her home. States she needs a weeks worth of medication.

## 2018-12-04 ENCOUNTER — TELEPHONE (OUTPATIENT)
Dept: NEUROLOGY | Facility: CLINIC | Age: 58
End: 2018-12-04

## 2018-12-04 RX ORDER — ERGOCALCIFEROL 1.25 MG/1
50000 CAPSULE ORAL
Qty: 12 CAPSULE | Refills: 3 | Status: SHIPPED | OUTPATIENT
Start: 2018-12-04 | End: 2019-11-29 | Stop reason: SDUPTHER

## 2019-04-12 ENCOUNTER — LAB REQUISITION (OUTPATIENT)
Dept: LAB | Facility: HOSPITAL | Age: 59
End: 2019-04-12

## 2019-04-12 ENCOUNTER — OFFICE VISIT (OUTPATIENT)
Dept: NEUROLOGY | Facility: CLINIC | Age: 59
End: 2019-04-12

## 2019-04-12 VITALS
DIASTOLIC BLOOD PRESSURE: 92 MMHG | WEIGHT: 203.4 LBS | RESPIRATION RATE: 16 BRPM | HEART RATE: 87 BPM | OXYGEN SATURATION: 92 % | BODY MASS INDEX: 31.92 KG/M2 | SYSTOLIC BLOOD PRESSURE: 140 MMHG | HEIGHT: 67 IN

## 2019-04-12 DIAGNOSIS — N32.81 OAB (OVERACTIVE BLADDER): ICD-10-CM

## 2019-04-12 DIAGNOSIS — G35 MULTIPLE SCLEROSIS (HCC): ICD-10-CM

## 2019-04-12 DIAGNOSIS — G35 MULTIPLE SCLEROSIS (HCC): Primary | ICD-10-CM

## 2019-04-12 DIAGNOSIS — Z00.00 ROUTINE GENERAL MEDICAL EXAMINATION AT A HEALTH CARE FACILITY: ICD-10-CM

## 2019-04-12 DIAGNOSIS — F33.41 RECURRENT MAJOR DEPRESSIVE DISORDER, IN PARTIAL REMISSION (HCC): ICD-10-CM

## 2019-04-12 PROCEDURE — 99214 OFFICE O/P EST MOD 30 MIN: CPT | Performed by: PSYCHIATRY & NEUROLOGY

## 2019-04-12 PROCEDURE — 36415 COLL VENOUS BLD VENIPUNCTURE: CPT | Performed by: PSYCHIATRY & NEUROLOGY

## 2019-04-12 RX ORDER — BUPROPION HYDROCHLORIDE 450 MG/1
TABLET, FILM COATED, EXTENDED RELEASE ORAL
COMMUNITY
Start: 2008-02-01 | End: 2019-06-11

## 2019-04-12 RX ORDER — DIPHENHYDRAMINE HYDROCHLORIDE 25 MG/1
TABLET ORAL
COMMUNITY

## 2019-04-12 NOTE — PROGRESS NOTES
Subjective:   Chief Complaint   Patient presents with   • Multiple Sclerosis       Patient ID: Christiane Figueroa is a 58 y.o. female.    History of Present Illness     58 y.o. woman with RRMS, OAB and MDD returns in follow up.  Last visit 8/10/18 started Aubagio, cont  mg TID, ordered labs, stopped oxybutynin.    MRI Brain, 7/23/18 as compared to 8/31/17 - no new/enlarging/enhancing lesions since 6/17/16; right frontal, right centrum semiovale PVWM, scattered T2 lesions      6/28/18:  CBC, CMP, TB - NCS    RRMS     Fatigue is mild.      No new or worsening sx.      Previous DMD:  Rebif, Tecfidera. Gilenya(macular edema).     MDD    Wellbutrin 450 mg daily improved mood.        OAB    Continued frequency and urgency decreased by consuming less caffeine.       Neuropathic pain     Leg pains are controlled on  mg TID.  Mild intensity on meds.     Past Medical History:   Diagnosis Date   • Anxiety    • Arthritis    • Depression    • Diabetes mellitus (CMS/HCC)    • Fatigue    • Hypertension    • Limb swelling     BLE, left leg worse   • LOM (loss of memory)    • Multiple sclerosis (CMS/HCC)    • Pain, joint, shoulder     Bilateral   • Vision problems        The following portions of the patient's history were reviewed and updated as appropriate: allergies, current medications, past medical history, past surgical history and problem list.    Review of Systems   Constitutional: Negative for activity change and unexpected weight change.   HENT: Positive for trouble swallowing. Negative for tinnitus.    Eyes: Negative for photophobia and visual disturbance.   Respiratory: Negative for apnea and choking.    Cardiovascular: Negative for leg swelling.   Endocrine: Positive for heat intolerance. Negative for cold intolerance.   Genitourinary: Positive for frequency and urgency. Negative for difficulty urinating and menstrual problem.   Musculoskeletal: Negative for back pain and gait problem.   Skin: Negative for  "color change.   Allergic/Immunologic: Negative for immunocompromised state.   Neurological: Positive for tremors and light-headedness. Negative for dizziness, seizures, syncope, facial asymmetry and speech difficulty.   Hematological: Negative for adenopathy. Does not bruise/bleed easily.   Psychiatric/Behavioral: Positive for decreased concentration and dysphoric mood. Negative for behavioral problems, confusion, hallucinations and sleep disturbance.        Objective:  Vitals:    04/12/19 1357   BP: 140/92   Pulse: 87   Resp: 16   SpO2: 92%   Weight: 92.3 kg (203 lb 6.4 oz)   Height: 170.2 cm (67\")       Neurologic Exam     Mental Status   Oriented to person, place, and time.   Follows 3 step commands.   Attention: decreased. Concentration: decreased.   Speech: speech is normal   Level of consciousness: alert  Knowledge: good and consistent with education. Unable to perform simple calculations.   Able to name object. Able to read. Able to repeat. Able to write. Normal comprehension.     Cranial Nerves     CN II   Visual fields full to confrontation.   Visual acuity: normal  Right visual field deficit: none  Left visual field deficit: none     CN III, IV, VI   Nystagmus: none   Diplopia: none  Ophthalmoparesis: none  Upgaze: normal  Downgaze: normal  Conjugate gaze: present    CN V   Facial sensation intact.   Right corneal reflex: normal  Left corneal reflex: normal    CN VII   Right facial weakness: none  Left facial weakness: none    CN VIII   Hearing: intact    CN IX, X   Palate: symmetric  Right gag reflex: normal  Left gag reflex: normal    CN XI   Right sternocleidomastoid strength: normal  Left sternocleidomastoid strength: normal    CN XII   Tongue: not atrophic  Fasciculations: absent  Tongue deviation: none    Motor Exam   Muscle bulk: normal  Overall muscle tone: increased  Right arm tone: normal  Left arm tone: normal  Right leg tone: increased  Left leg tone: increased    Strength   Strength 5/5 " throughout.   Right neck flexion: 5/5  Left neck flexion: 5/5  Right neck extension: 5/5  Left neck extension: 5/5  Right deltoid: 5/5  Left deltoid: 5/5  Right biceps: 5/5  Left biceps: 5/5  Right triceps: 5/5  Left triceps: 5/5  Right wrist flexion: 5/5  Left wrist flexion: 5/5  Right wrist extension: 5/5  Left wrist extension: 5/5  Right interossei: 5/5  Left interossei: 5/5  Right abdominals: 5/5  Left abdominals: 5/5  Right iliopsoas: 4/5  Left iliopsoas: 5/5  Right quadriceps: 4/5  Left quadriceps: 5/5  Right hamstrin/5  Left hamstrin/5  Right glutei: 4/5  Left glutei: 5/5  Right anterior tibial: 4/5  Left anterior tibial: 5/5  Right posterior tibial: 4/5  Left posterior tibial: 5/5  Right peroneal: 4/5  Left peroneal: 5/5  Right gastroc: 4/5  Left gastroc: 5/5    Sensory Exam   Light touch normal.     Gait, Coordination, and Reflexes     Gait  Gait: shuffling and wide-based    Tremor   Resting tremor: absent  Intention tremor: present  Action tremor: left arm and right arm      Physical Exam   Constitutional: She is oriented to person, place, and time.   Neurological: She is oriented to person, place, and time. She has normal strength.   Psychiatric: Her speech is normal.     Results Encounter on 2018   Component Date Value Ref Range Status   • WBC 08/10/2018 3.4  3.4 - 10.8 x10E3/uL Final   • RBC 08/10/2018 4.43  3.77 - 5.28 x10E6/uL Final   • Hemoglobin 08/10/2018 13.0  11.1 - 15.9 g/dL Final   • Hematocrit 08/10/2018 40.3  34.0 - 46.6 % Final   • MCV 08/10/2018 91  79 - 97 fL Final   • MCH 08/10/2018 29.3  26.6 - 33.0 pg Final   • MCHC 08/10/2018 32.3  31.5 - 35.7 g/dL Final   • RDW 08/10/2018 12.7  12.3 - 15.4 % Final   • Platelets 08/10/2018 209  150 - 379 x10E3/uL Final   • Neutrophil Rel % 08/10/2018 61  Not Estab. % Final   • Lymphocyte Rel % 08/10/2018 20  Not Estab. % Final   • Monocyte Rel % 08/10/2018 14  Not Estab. % Final   • Eosinophil Rel % 08/10/2018 4  Not Estab. % Final   •  Basophil Rel % 08/10/2018 1  Not Estab. % Final   • Neutrophils Absolute 08/10/2018 2.1  1.4 - 7.0 x10E3/uL Final   • Lymphocytes Absolute 08/10/2018 0.7  0.7 - 3.1 x10E3/uL Final   • Monocytes Absolute 08/10/2018 0.5  0.1 - 0.9 x10E3/uL Final   • Eosinophils Absolute 08/10/2018 0.2  0.0 - 0.4 x10E3/uL Final   • Basophils Absolute 08/10/2018 0.0  0.0 - 0.2 x10E3/uL Final   • Immature Granulocyte Rel % 08/10/2018 0  Not Estab. % Final   • Immature Grans Absolute 08/10/2018 0.0  0.0 - 0.1 x10E3/uL Final   • Glucose 08/10/2018 224* 65 - 99 mg/dL Final   • BUN 08/10/2018 12  6 - 24 mg/dL Final   • Creatinine 08/10/2018 0.58  0.57 - 1.00 mg/dL Final   • eGFR Non African Am 08/10/2018 101  >59 mL/min/1.73 Final   • eGFR African Am 08/10/2018 117  >59 mL/min/1.73 Final   • BUN/Creatinine Ratio 08/10/2018 21  9 - 23 Final   • Sodium 08/10/2018 143  134 - 144 mmol/L Final   • Potassium 08/10/2018 4.0  3.5 - 5.2 mmol/L Final   • Chloride 08/10/2018 101  96 - 106 mmol/L Final   • Total CO2 08/10/2018 27  20 - 29 mmol/L Final   • Calcium 08/10/2018 9.6  8.7 - 10.2 mg/dL Final   • Total Protein 08/10/2018 6.9  6.0 - 8.5 g/dL Final   • Albumin 08/10/2018 4.3  3.5 - 5.5 g/dL Final   • Globulin 08/10/2018 2.6  1.5 - 4.5 g/dL Final   • A/G Ratio 08/10/2018 1.7  1.2 - 2.2 Final   • Total Bilirubin 08/10/2018 <0.2  0.0 - 1.2 mg/dL Final   • Alkaline Phosphatase 08/10/2018 71  39 - 117 IU/L Final   • AST (SGOT) 08/10/2018 23  0 - 40 IU/L Final   • ALT (SGPT) 08/10/2018 28  0 - 32 IU/L Final   • QuantiFERON-TB Gold In Tube 08/10/2018 Indeterminate* Negative Final    Comment: Mitogen (positive control) gave low response.  This may indicate anergy or immune suppression. Early draws and  extended transit time may also result in low positive control and  indeterminate results.  The specimen received for QuantiFERON testing was incubated by the  ordering institution.  Specific procedures outlined in our Directory  of Services and in the  package insert for the QuantiFERON Gold  (In Tube) test must be followed to enable for proper stimulation of  cells for the production of interferon gamma.     • QuantiFERON Criteria 08/10/2018 Comment   Final    Comment: To be considered positive a specimen should have a TB Ag minus Nil  value greater than or equal to 0.35 IU/mL and in addition the TB Ag  minus Nil value must be greater than or equal to 25% of the Nil  value. There may be insufficient information in these values to  differentiate between some negative and some indeterminate test  values.     • QuantiFERON TB Ag Value 08/10/2018 0.05  IU/mL Final   • QuantiFERON Nil Value 08/10/2018 0.08  IU/mL Final   • QuantiFERON Mitogen Value 08/10/2018 0.18  IU/mL Final   • QFT TB Ag minus Nil Value 08/10/2018 <0.00  IU/mL Final   • Interpretation 08/10/2018 Comment   Final    Comment: The QuantiFERON TB Gold (in Tube) assay is intended for use as an aid  in the diagnosis of TB infection. Negative results suggest that there  is no TB infection. In patients with high suspicion of exposure, a  negative test should be repeated. A positive test indicates infection  with Mycobacterium tuberculosis. Among individuals without  tuberculosis infection, a positive test may be due to exposure to  M. kansasii, M. szulgai or M. marinum. On the Internet, go to  cdc.gov/tb for further details.         Assessment/Plan:       Problems Addressed this Visit        Nervous and Auditory    Multiple sclerosis (CMS/HCC) - Primary     Sx stable on Aubagio     CBC, CMP            Relevant Orders    CBC & Differential    Comprehensive Metabolic Panel       Musculoskeletal and Integument    OAB (overactive bladder)     Sx stable             Other    Depression     Psychological condition is unchanged.  Continue current treatment regimen.  Psychological condition  will be reassessed at the next regular appointment.         Relevant Medications    buPROPion XL (FORFIVO XL) 450 MG 24 hr  tablet    Teriflunomide (AUBAGIO PO)                    spouse

## 2019-04-13 LAB
ALBUMIN SERPL-MCNC: 4.7 G/DL (ref 3.5–5.5)
ALBUMIN/GLOB SERPL: 1.7 {RATIO} (ref 1.2–2.2)
ALP SERPL-CCNC: 62 IU/L (ref 39–117)
ALT SERPL-CCNC: 21 IU/L (ref 0–32)
AST SERPL-CCNC: 24 IU/L (ref 0–40)
BASOPHILS # BLD AUTO: 0.1 X10E3/UL (ref 0–0.2)
BASOPHILS NFR BLD AUTO: 1 %
BILIRUB SERPL-MCNC: 0.2 MG/DL (ref 0–1.2)
BUN SERPL-MCNC: 16 MG/DL (ref 6–24)
BUN/CREAT SERPL: 21 (ref 9–23)
CALCIUM SERPL-MCNC: 10.1 MG/DL (ref 8.7–10.2)
CHLORIDE SERPL-SCNC: 99 MMOL/L (ref 96–106)
CO2 SERPL-SCNC: 25 MMOL/L (ref 20–29)
CREAT SERPL-MCNC: 0.75 MG/DL (ref 0.57–1)
EOSINOPHIL # BLD AUTO: 0.3 X10E3/UL (ref 0–0.4)
EOSINOPHIL NFR BLD AUTO: 6 %
ERYTHROCYTE [DISTWIDTH] IN BLOOD BY AUTOMATED COUNT: 13.6 % (ref 12.3–15.4)
GLOBULIN SER CALC-MCNC: 2.8 G/DL (ref 1.5–4.5)
GLUCOSE SERPL-MCNC: 114 MG/DL (ref 65–99)
HCT VFR BLD AUTO: 42.4 % (ref 34–46.6)
HGB BLD-MCNC: 13.9 G/DL (ref 11.1–15.9)
IMM GRANULOCYTES # BLD AUTO: 0 X10E3/UL (ref 0–0.1)
IMM GRANULOCYTES NFR BLD AUTO: 0 %
LYMPHOCYTES # BLD AUTO: 1.2 X10E3/UL (ref 0.7–3.1)
LYMPHOCYTES NFR BLD AUTO: 24 %
MCH RBC QN AUTO: 29.5 PG (ref 26.6–33)
MCHC RBC AUTO-ENTMCNC: 32.8 G/DL (ref 31.5–35.7)
MCV RBC AUTO: 90 FL (ref 79–97)
MONOCYTES # BLD AUTO: 0.5 X10E3/UL (ref 0.1–0.9)
MONOCYTES NFR BLD AUTO: 10 %
NEUTROPHILS # BLD AUTO: 3 X10E3/UL (ref 1.4–7)
NEUTROPHILS NFR BLD AUTO: 59 %
PLATELET # BLD AUTO: 256 X10E3/UL (ref 150–379)
POTASSIUM SERPL-SCNC: 4.1 MMOL/L (ref 3.5–5.2)
PROT SERPL-MCNC: 7.5 G/DL (ref 6–8.5)
RBC # BLD AUTO: 4.71 X10E6/UL (ref 3.77–5.28)
SODIUM SERPL-SCNC: 141 MMOL/L (ref 134–144)
WBC # BLD AUTO: 5 X10E3/UL (ref 3.4–10.8)

## 2019-04-17 ENCOUNTER — RESULTS ENCOUNTER (OUTPATIENT)
Dept: NEUROLOGY | Facility: CLINIC | Age: 59
End: 2019-04-17

## 2019-04-17 DIAGNOSIS — G35 MULTIPLE SCLEROSIS (HCC): ICD-10-CM

## 2019-06-11 ENCOUNTER — TELEPHONE (OUTPATIENT)
Dept: NEUROLOGY | Facility: CLINIC | Age: 59
End: 2019-06-11

## 2019-06-11 DIAGNOSIS — G35 MULTIPLE SCLEROSIS (HCC): Primary | ICD-10-CM

## 2019-06-11 RX ORDER — BUPROPION HYDROCHLORIDE 150 MG/1
150 TABLET ORAL EVERY MORNING
Qty: 90 TABLET | Refills: 3 | Status: SHIPPED | OUTPATIENT
Start: 2019-06-11 | End: 2020-08-21 | Stop reason: SDUPTHER

## 2019-06-11 RX ORDER — BUPROPION HYDROCHLORIDE 300 MG/1
300 TABLET ORAL DAILY
Qty: 90 TABLET | Refills: 3 | Status: SHIPPED | OUTPATIENT
Start: 2019-06-11 | End: 2020-08-21 | Stop reason: SDUPTHER

## 2019-06-11 NOTE — TELEPHONE ENCOUNTER
Patient is wanting to keep the dosage the same. She is out of the 300mg tablets. When I called to check with the patient she stated that she is almost out of the 150- so Both 300mg and 150mg tablets need to be filled. Thanks!

## 2019-06-11 NOTE — TELEPHONE ENCOUNTER
Patient called and requested a refill on her Wellbutrin 300mg. Please send in the script for the patient to the pharmacy listed on the chart. Thanks!

## 2019-10-04 RX ORDER — GABAPENTIN 300 MG/1
600 CAPSULE ORAL 3 TIMES DAILY
Qty: 180 CAPSULE | Refills: 5 | Status: SHIPPED | OUTPATIENT
Start: 2019-10-04 | End: 2019-10-14 | Stop reason: SDUPTHER

## 2019-10-04 RX ORDER — GABAPENTIN 300 MG/1
600 CAPSULE ORAL 3 TIMES DAILY
Qty: 180 CAPSULE | Refills: 5 | Status: SHIPPED | OUTPATIENT
Start: 2019-10-04 | End: 2019-10-04 | Stop reason: SDUPTHER

## 2019-10-04 NOTE — TELEPHONE ENCOUNTER
----- Message from Edel Munguia sent at 10/4/2019 12:52 PM EDT -----  Regarding: REFILL  Contact: [phone]  Patient request gabapentin 300 mg   [facility]

## 2019-10-14 ENCOUNTER — LAB REQUISITION (OUTPATIENT)
Dept: LAB | Facility: HOSPITAL | Age: 59
End: 2019-10-14

## 2019-10-14 ENCOUNTER — TELEPHONE (OUTPATIENT)
Dept: NEUROLOGY | Facility: CLINIC | Age: 59
End: 2019-10-14

## 2019-10-14 ENCOUNTER — OFFICE VISIT (OUTPATIENT)
Dept: NEUROLOGY | Facility: CLINIC | Age: 59
End: 2019-10-14

## 2019-10-14 VITALS
BODY MASS INDEX: 29.91 KG/M2 | HEART RATE: 83 BPM | SYSTOLIC BLOOD PRESSURE: 130 MMHG | WEIGHT: 191 LBS | DIASTOLIC BLOOD PRESSURE: 74 MMHG | OXYGEN SATURATION: 95 %

## 2019-10-14 DIAGNOSIS — Z00.00 ROUTINE GENERAL MEDICAL EXAMINATION AT A HEALTH CARE FACILITY: ICD-10-CM

## 2019-10-14 DIAGNOSIS — G35 MULTIPLE SCLEROSIS (HCC): Primary | ICD-10-CM

## 2019-10-14 DIAGNOSIS — F33.41 RECURRENT MAJOR DEPRESSIVE DISORDER, IN PARTIAL REMISSION (HCC): ICD-10-CM

## 2019-10-14 DIAGNOSIS — N32.81 OAB (OVERACTIVE BLADDER): ICD-10-CM

## 2019-10-14 PROCEDURE — 36415 COLL VENOUS BLD VENIPUNCTURE: CPT | Performed by: PSYCHIATRY & NEUROLOGY

## 2019-10-14 PROCEDURE — 99214 OFFICE O/P EST MOD 30 MIN: CPT | Performed by: PSYCHIATRY & NEUROLOGY

## 2019-10-14 RX ORDER — ALPRAZOLAM 1 MG/1
1 TABLET ORAL NIGHTLY PRN
Qty: 2 TABLET | Refills: 0 | Status: SHIPPED | OUTPATIENT
Start: 2019-10-14 | End: 2020-10-13

## 2019-10-14 RX ORDER — GABAPENTIN 300 MG/1
CAPSULE ORAL
Qty: 720 CAPSULE | Refills: 1 | Status: SHIPPED | OUTPATIENT
Start: 2019-10-14 | End: 2020-08-21 | Stop reason: SDUPTHER

## 2019-10-14 NOTE — PROGRESS NOTES
Subjective:   Chief Complaint   Patient presents with   • Multiple Sclerosis       Patient ID: Christiane Figueroa is a 59 y.o. female.    History of Present Illness     59 y.o. woman with RRMS, OAB and MDD returns in follow up.  Last visit 4/12/19 continued Aubagio,  mg TID, ordered labs.    MRI Brain, 7/23/18 as compared to 8/31/17 - no new/enlarging/enhancing lesions since 6/17/16; right frontal, right centrum semiovale PVWM, scattered T2 lesions      6/28/18:  CBC, CMP, TB - NCS    RRMS     Fatigue is moderate.    Heat intolerance is severe.       No new or worsening sx.      Previous DMD:  Rebif, Tecfidera. Gilenya(macular edema).    MDD    Wellbutrin 450 mg daily improved mood.        OAB    Not taking any medications after losing weight.      Neuropathic pain     Leg pains are controlled on  mg TID during the day.      Past Medical History:   Diagnosis Date   • Anxiety    • Arthritis    • Depression    • Diabetes mellitus (CMS/HCC)    • Fatigue    • Hypertension    • Limb swelling     BLE, left leg worse   • LOM (loss of memory)    • Multiple sclerosis (CMS/HCC)    • Pain, joint, shoulder     Bilateral   • Vision problems        The following portions of the patient's history were reviewed and updated as appropriate: allergies, current medications, past medical history, past surgical history and problem list.    Review of Systems   Constitutional: Negative for activity change and unexpected weight change.   HENT: Positive for trouble swallowing. Negative for tinnitus.    Eyes: Negative for photophobia and visual disturbance.   Respiratory: Negative for apnea and choking.    Cardiovascular: Negative for leg swelling.   Endocrine: Positive for heat intolerance. Negative for cold intolerance.   Genitourinary: Positive for frequency and urgency. Negative for difficulty urinating and menstrual problem.   Musculoskeletal: Negative for back pain and gait problem.   Skin: Negative for color change.    Allergic/Immunologic: Negative for immunocompromised state.   Neurological: Positive for tremors and light-headedness. Negative for dizziness, seizures, syncope, facial asymmetry and speech difficulty.   Hematological: Negative for adenopathy. Does not bruise/bleed easily.   Psychiatric/Behavioral: Positive for decreased concentration and dysphoric mood. Negative for behavioral problems, confusion, hallucinations and sleep disturbance.        Objective:  Vitals:    10/14/19 1312   BP: 130/74   Pulse: 83   SpO2: 95%   Weight: 86.6 kg (191 lb)       Neurologic Exam     Mental Status   Oriented to person, place, and time.   Follows 3 step commands.   Attention: decreased. Concentration: decreased.   Speech: speech is normal   Level of consciousness: alert  Knowledge: good and consistent with education. Unable to perform simple calculations.   Able to name object. Able to read. Able to repeat. Able to write. Normal comprehension.     Cranial Nerves     CN II   Visual fields full to confrontation.   Visual acuity: normal  Right visual field deficit: none  Left visual field deficit: none     CN III, IV, VI   Nystagmus: none   Diplopia: none  Ophthalmoparesis: none  Upgaze: normal  Downgaze: normal  Conjugate gaze: present    CN V   Facial sensation intact.   Right corneal reflex: normal  Left corneal reflex: normal    CN VII   Right facial weakness: none  Left facial weakness: none    CN VIII   Hearing: intact    CN IX, X   Palate: symmetric  Right gag reflex: normal  Left gag reflex: normal    CN XI   Right sternocleidomastoid strength: normal  Left sternocleidomastoid strength: normal    CN XII   Tongue: not atrophic  Fasciculations: absent  Tongue deviation: none    Motor Exam   Muscle bulk: normal  Overall muscle tone: increased  Right arm tone: normal  Left arm tone: normal  Right leg tone: increased  Left leg tone: increased    Strength   Strength 5/5 throughout.   Right neck flexion: 5/5  Left neck flexion:  5/5  Right neck extension: 5/5  Left neck extension: 5/5  Right deltoid: 5/5  Left deltoid: 5/5  Right biceps: 5/5  Left biceps: 5/5  Right triceps: 5/5  Left triceps: 5/5  Right wrist flexion: 5/5  Left wrist flexion: 5/5  Right wrist extension: 5/5  Left wrist extension: 5/5  Right interossei: 5/5  Left interossei: 5/5  Right abdominals: 5/5  Left abdominals: 5/5  Right iliopsoas: 4/5  Left iliopsoas: 5/5  Right quadriceps: 4/5  Left quadriceps: 5/5  Right hamstrin/5  Left hamstrin/5  Right glutei: 4/5  Left glutei: 5/5  Right anterior tibial: 4/  Left anterior tibial: 5/  Right posterior tibial: 4/5  Left posterior tibial: 5/5  Right peroneal: 4/5  Left peroneal: 5/5  Right gastroc: 4/5  Left gastroc: 5/5    Sensory Exam   Light touch normal.     Gait, Coordination, and Reflexes     Gait  Gait: shuffling and wide-based    Tremor   Resting tremor: absent  Intention tremor: present  Action tremor: left arm and right arm      Physical Exam   Constitutional: She is oriented to person, place, and time.   Neurological: She is oriented to person, place, and time. She has normal strength.   Psychiatric: Her speech is normal.     Orders Only on 2019   Component Date Value Ref Range Status   • WBC 2019 5.0  3.4 - 10.8 x10E3/uL Final   • RBC 2019 4.71  3.77 - 5.28 x10E6/uL Final   • Hemoglobin 2019 13.9  11.1 - 15.9 g/dL Final   • Hematocrit 2019 42.4  34.0 - 46.6 % Final   • MCV 2019 90  79 - 97 fL Final   • MCH 2019 29.5  26.6 - 33.0 pg Final   • MCHC 2019 32.8  31.5 - 35.7 g/dL Final   • RDW 2019 13.6  12.3 - 15.4 % Final   • Platelets 2019 256  150 - 379 x10E3/uL Final   • Neutrophil Rel % 2019 59  Not Estab. % Final   • Lymphocyte Rel % 2019 24  Not Estab. % Final   • Monocyte Rel % 2019 10  Not Estab. % Final   • Eosinophil Rel % 2019 6  Not Estab. % Final   • Basophil Rel % 2019 1  Not Estab. % Final   • Neutrophils  Absolute 04/12/2019 3.0  1.4 - 7.0 x10E3/uL Final   • Lymphocytes Absolute 04/12/2019 1.2  0.7 - 3.1 x10E3/uL Final   • Monocytes Absolute 04/12/2019 0.5  0.1 - 0.9 x10E3/uL Final   • Eosinophils Absolute 04/12/2019 0.3  0.0 - 0.4 x10E3/uL Final   • Basophils Absolute 04/12/2019 0.1  0.0 - 0.2 x10E3/uL Final   • Immature Granulocyte Rel % 04/12/2019 0  Not Estab. % Final   • Immature Grans Absolute 04/12/2019 0.0  0.0 - 0.1 x10E3/uL Final   • Glucose 04/12/2019 114* 65 - 99 mg/dL Final   • BUN 04/12/2019 16  6 - 24 mg/dL Final   • Creatinine 04/12/2019 0.75  0.57 - 1.00 mg/dL Final   • eGFR Non African Am 04/12/2019 88  >59 mL/min/1.73 Final   • eGFR African Am 04/12/2019 101  >59 mL/min/1.73 Final   • BUN/Creatinine Ratio 04/12/2019 21  9 - 23 Final   • Sodium 04/12/2019 141  134 - 144 mmol/L Final   • Potassium 04/12/2019 4.1  3.5 - 5.2 mmol/L Final   • Chloride 04/12/2019 99  96 - 106 mmol/L Final   • Total CO2 04/12/2019 25  20 - 29 mmol/L Final   • Calcium 04/12/2019 10.1  8.7 - 10.2 mg/dL Final   • Total Protein 04/12/2019 7.5  6.0 - 8.5 g/dL Final   • Albumin 04/12/2019 4.7  3.5 - 5.5 g/dL Final   • Globulin 04/12/2019 2.8  1.5 - 4.5 g/dL Final   • A/G Ratio 04/12/2019 1.7  1.2 - 2.2 Final   • Total Bilirubin 04/12/2019 0.2  0.0 - 1.2 mg/dL Final   • Alkaline Phosphatase 04/12/2019 62  39 - 117 IU/L Final   • AST (SGOT) 04/12/2019 24  0 - 40 IU/L Final   • ALT (SGPT) 04/12/2019 21  0 - 32 IU/L Final       Assessment/Plan:       Problems Addressed this Visit        Nervous and Auditory    Multiple sclerosis (CMS/HCC) - Primary     No new or worsening on Aubagio    Continue Aubagio    CBC,CMP    MRI Valentin     Increase , 600, 1200 mg          Relevant Orders    MRI Brain With & Without Contrast    CBC & Differential    Comprehensive Metabolic Panel       Genitourinary    OAB (overactive bladder)     Stable off meds             Other    Depression     Psychological condition is unchanged.  Continue current  treatment regimen.  Psychological condition  will be reassessed at the next regular appointment.

## 2019-10-14 NOTE — TELEPHONE ENCOUNTER
----- Message from Edel Munguia sent at 10/14/2019  1:52 PM EDT -----  Regarding: REFILL  Contact: [phone]  [facility] [place]    Please send in xanax for MRI

## 2019-10-14 NOTE — ASSESSMENT & PLAN NOTE
No new or worsening on Aubagio    Continue Aubagio    CBC,CMP    MRI Valentin     Increase , 600, 1200 mg

## 2019-10-15 LAB
ALBUMIN SERPL-MCNC: 4.8 G/DL (ref 3.6–4.8)
ALBUMIN/GLOB SERPL: 1.8 {RATIO} (ref 1.2–2.2)
ALP SERPL-CCNC: 55 IU/L (ref 39–117)
ALT SERPL-CCNC: 15 IU/L (ref 0–32)
AST SERPL-CCNC: 22 IU/L (ref 0–40)
BASOPHILS # BLD AUTO: 0.1 X10E3/UL (ref 0–0.2)
BASOPHILS NFR BLD AUTO: 1 %
BILIRUB SERPL-MCNC: <0.2 MG/DL (ref 0–1.2)
BUN SERPL-MCNC: 18 MG/DL (ref 8–27)
BUN/CREAT SERPL: 20 (ref 12–28)
CALCIUM SERPL-MCNC: 9.8 MG/DL (ref 8.7–10.3)
CHLORIDE SERPL-SCNC: 102 MMOL/L (ref 96–106)
CO2 SERPL-SCNC: 23 MMOL/L (ref 20–29)
CREAT SERPL-MCNC: 0.88 MG/DL (ref 0.57–1)
EOSINOPHIL # BLD AUTO: 0.3 X10E3/UL (ref 0–0.4)
EOSINOPHIL NFR BLD AUTO: 6 %
ERYTHROCYTE [DISTWIDTH] IN BLOOD BY AUTOMATED COUNT: 13.4 % (ref 12.3–15.4)
GLOBULIN SER CALC-MCNC: 2.6 G/DL (ref 1.5–4.5)
GLUCOSE SERPL-MCNC: 100 MG/DL (ref 65–99)
HCT VFR BLD AUTO: 40.5 % (ref 34–46.6)
HGB BLD-MCNC: 13.1 G/DL (ref 11.1–15.9)
IMM GRANULOCYTES # BLD AUTO: 0 X10E3/UL (ref 0–0.1)
IMM GRANULOCYTES NFR BLD AUTO: 0 %
LYMPHOCYTES # BLD AUTO: 1.1 X10E3/UL (ref 0.7–3.1)
LYMPHOCYTES NFR BLD AUTO: 24 %
MCH RBC QN AUTO: 29.8 PG (ref 26.6–33)
MCHC RBC AUTO-ENTMCNC: 32.3 G/DL (ref 31.5–35.7)
MCV RBC AUTO: 92 FL (ref 79–97)
MONOCYTES # BLD AUTO: 0.5 X10E3/UL (ref 0.1–0.9)
MONOCYTES NFR BLD AUTO: 10 %
NEUTROPHILS # BLD AUTO: 2.8 X10E3/UL (ref 1.4–7)
NEUTROPHILS NFR BLD AUTO: 59 %
PLATELET # BLD AUTO: 193 X10E3/UL (ref 150–450)
POTASSIUM SERPL-SCNC: 4.8 MMOL/L (ref 3.5–5.2)
PROT SERPL-MCNC: 7.4 G/DL (ref 6–8.5)
RBC # BLD AUTO: 4.39 X10E6/UL (ref 3.77–5.28)
SODIUM SERPL-SCNC: 143 MMOL/L (ref 134–144)
WBC # BLD AUTO: 4.8 X10E3/UL (ref 3.4–10.8)

## 2019-10-19 ENCOUNTER — RESULTS ENCOUNTER (OUTPATIENT)
Dept: NEUROLOGY | Facility: CLINIC | Age: 59
End: 2019-10-19

## 2019-10-19 DIAGNOSIS — G35 MULTIPLE SCLEROSIS (HCC): ICD-10-CM

## 2019-10-31 ENCOUNTER — OFFICE VISIT (OUTPATIENT)
Dept: NEUROLOGY | Facility: CLINIC | Age: 59
End: 2019-10-31

## 2019-10-31 VITALS
SYSTOLIC BLOOD PRESSURE: 128 MMHG | HEART RATE: 80 BPM | OXYGEN SATURATION: 97 % | DIASTOLIC BLOOD PRESSURE: 78 MMHG | BODY MASS INDEX: 29.82 KG/M2 | WEIGHT: 190 LBS | HEIGHT: 67 IN

## 2019-10-31 DIAGNOSIS — F33.41 RECURRENT MAJOR DEPRESSIVE DISORDER, IN PARTIAL REMISSION (HCC): ICD-10-CM

## 2019-10-31 DIAGNOSIS — M79.2 NEUROPATHIC PAIN: ICD-10-CM

## 2019-10-31 DIAGNOSIS — N32.81 OAB (OVERACTIVE BLADDER): ICD-10-CM

## 2019-10-31 DIAGNOSIS — G35 MULTIPLE SCLEROSIS (HCC): Primary | ICD-10-CM

## 2019-10-31 PROCEDURE — 99214 OFFICE O/P EST MOD 30 MIN: CPT | Performed by: PSYCHIATRY & NEUROLOGY

## 2019-10-31 RX ORDER — BUSPIRONE HYDROCHLORIDE 5 MG/1
5 TABLET ORAL 2 TIMES DAILY
Qty: 60 TABLET | Refills: 5 | Status: SHIPPED | OUTPATIENT
Start: 2019-10-31 | End: 2022-03-28 | Stop reason: SDUPTHER

## 2019-10-31 NOTE — ASSESSMENT & PLAN NOTE
Psychological condition is unchanged.  Medication changes per orders.  Psychological condition  will be reassessed in 3 months.      Start Buspar 5 mg BID    Continue Wellbutrin

## 2019-10-31 NOTE — PROGRESS NOTES
Subjective:   Chief Complaint   Patient presents with   • Multiple Sclerosis       Patient ID: Christiane Figueroa is a 59 y.o. female.    History of Present Illness     59 y.o. woman with RRMS, OAB and MDD returns in follow up.  Last visit 10/14/19 continued Aubagio, increased  mg BID and 1200 mg qhs, ordered labs and MRI Brain.    MRI Brain, 10/31/19 as compared to 7/23/18 - no new/enlarging/enhancing lesions since 6/17/16; right frontal, right centrum semiovale PVWM, scattered T2 lesions      10/14/19:  CBC, CMP - NCS    RRMS      No new or worsening sx.      Previous DMD:  Rebif, Tecfidera. Gilenya(macular edema).    MDD    Wellbutrin 450 mg daily improved mood.  Improved anxiety on xanax for MRI.       OAB    Controlled off meds.      Neuropathic pain     Leg pains are controlled on  mg        Past Medical History:   Diagnosis Date   • Anxiety    • Arthritis    • Depression    • Diabetes mellitus (CMS/HCC)    • Fatigue    • Hypertension    • Limb swelling     BLE, left leg worse   • LOM (loss of memory)    • Multiple sclerosis (CMS/HCC)    • Pain, joint, shoulder     Bilateral   • Vision problems        The following portions of the patient's history were reviewed and updated as appropriate: allergies, current medications, past medical history, past surgical history and problem list.    Review of Systems   Constitutional: Negative for activity change and unexpected weight change.   HENT: Positive for trouble swallowing. Negative for tinnitus.    Eyes: Negative for photophobia and visual disturbance.   Respiratory: Negative for apnea and choking.    Cardiovascular: Negative for leg swelling.   Endocrine: Positive for heat intolerance. Negative for cold intolerance.   Genitourinary: Positive for frequency and urgency. Negative for difficulty urinating and menstrual problem.   Musculoskeletal: Negative for back pain and gait problem.   Skin: Negative for color change.   Allergic/Immunologic: Negative for  "immunocompromised state.   Neurological: Positive for tremors and light-headedness. Negative for dizziness, seizures, syncope, facial asymmetry and speech difficulty.   Hematological: Negative for adenopathy. Does not bruise/bleed easily.   Psychiatric/Behavioral: Positive for decreased concentration and dysphoric mood. Negative for behavioral problems, confusion, hallucinations and sleep disturbance.        Objective:  Vitals:    10/31/19 1324   BP: 128/78   Pulse: 80   SpO2: 97%   Weight: 86.2 kg (190 lb)   Height: 170.2 cm (67\")       Neurologic Exam     Mental Status   Oriented to person, place, and time.   Follows 3 step commands.   Attention: decreased. Concentration: decreased.   Speech: speech is normal   Level of consciousness: alert  Knowledge: good and consistent with education. Unable to perform simple calculations.   Able to name object. Able to read. Able to repeat. Able to write. Normal comprehension.     Cranial Nerves     CN II   Visual fields full to confrontation.   Visual acuity: normal  Right visual field deficit: none  Left visual field deficit: none     CN III, IV, VI   Nystagmus: none   Diplopia: none  Ophthalmoparesis: none  Upgaze: normal  Downgaze: normal  Conjugate gaze: present    CN V   Facial sensation intact.   Right corneal reflex: normal  Left corneal reflex: normal    CN VII   Right facial weakness: none  Left facial weakness: none    CN VIII   Hearing: intact    CN IX, X   Palate: symmetric  Right gag reflex: normal  Left gag reflex: normal    CN XI   Right sternocleidomastoid strength: normal  Left sternocleidomastoid strength: normal    CN XII   Tongue: not atrophic  Fasciculations: absent  Tongue deviation: none    Motor Exam   Muscle bulk: normal  Overall muscle tone: increased  Right arm tone: normal  Left arm tone: normal  Right leg tone: increased  Left leg tone: increased    Strength   Strength 5/5 throughout.   Right neck flexion: 5/5  Left neck flexion: 5/5  Right neck " extension: 5/5  Left neck extension: 5/5  Right deltoid: 5/5  Left deltoid: 5/5  Right biceps: 5/5  Left biceps: 5/5  Right triceps: 5/5  Left triceps: 5/5  Right wrist flexion: 5/5  Left wrist flexion: 5/5  Right wrist extension: 5/5  Left wrist extension: 5/5  Right interossei: 5/5  Left interossei: 5/5  Right abdominals: 5/5  Left abdominals: 5/5  Right iliopsoas: 4/5  Left iliopsoas: 5/5  Right quadriceps: 4/5  Left quadriceps: 5/5  Right hamstrin/5  Left hamstrin/5  Right glutei: 4/5  Left glutei: 5/5  Right anterior tibial: 4/5  Left anterior tibial: 5/  Right posterior tibial: 4/5  Left posterior tibial: 5/5  Right peroneal: 4/5  Left peroneal: 5/5  Right gastroc: 4/5  Left gastroc: 5/5    Sensory Exam   Light touch normal.     Gait, Coordination, and Reflexes     Gait  Gait: shuffling and wide-based    Tremor   Resting tremor: absent  Intention tremor: present  Action tremor: left arm and right arm      Physical Exam   Constitutional: She is oriented to person, place, and time.   Neurological: She is oriented to person, place, and time. She has normal strength.   Psychiatric: Her speech is normal.     Orders Only on 10/14/2019   Component Date Value Ref Range Status   • WBC 10/14/2019 4.8  3.4 - 10.8 x10E3/uL Final   • RBC 10/14/2019 4.39  3.77 - 5.28 x10E6/uL Final   • Hemoglobin 10/14/2019 13.1  11.1 - 15.9 g/dL Final   • Hematocrit 10/14/2019 40.5  34.0 - 46.6 % Final   • MCV 10/14/2019 92  79 - 97 fL Final   • MCH 10/14/2019 29.8  26.6 - 33.0 pg Final   • MCHC 10/14/2019 32.3  31.5 - 35.7 g/dL Final   • RDW 10/14/2019 13.4  12.3 - 15.4 % Final   • Platelets 10/14/2019 193  150 - 450 x10E3/uL Final   • Neutrophil Rel % 10/14/2019 59  Not Estab. % Final   • Lymphocyte Rel % 10/14/2019 24  Not Estab. % Final   • Monocyte Rel % 10/14/2019 10  Not Estab. % Final   • Eosinophil Rel % 10/14/2019 6  Not Estab. % Final   • Basophil Rel % 10/14/2019 1  Not Estab. % Final   • Neutrophils Absolute  10/14/2019 2.8  1.4 - 7.0 x10E3/uL Final   • Lymphocytes Absolute 10/14/2019 1.1  0.7 - 3.1 x10E3/uL Final   • Monocytes Absolute 10/14/2019 0.5  0.1 - 0.9 x10E3/uL Final   • Eosinophils Absolute 10/14/2019 0.3  0.0 - 0.4 x10E3/uL Final   • Basophils Absolute 10/14/2019 0.1  0.0 - 0.2 x10E3/uL Final   • Immature Granulocyte Rel % 10/14/2019 0  Not Estab. % Final   • Immature Grans Absolute 10/14/2019 0.0  0.0 - 0.1 x10E3/uL Final   • Glucose 10/14/2019 100* 65 - 99 mg/dL Final   • BUN 10/14/2019 18  8 - 27 mg/dL Final   • Creatinine 10/14/2019 0.88  0.57 - 1.00 mg/dL Final   • eGFR Non  Am 10/14/2019 72  >59 mL/min/1.73 Final   • eGFR African Am 10/14/2019 83  >59 mL/min/1.73 Final   • BUN/Creatinine Ratio 10/14/2019 20  12 - 28 Final   • Sodium 10/14/2019 143  134 - 144 mmol/L Final   • Potassium 10/14/2019 4.8  3.5 - 5.2 mmol/L Final   • Chloride 10/14/2019 102  96 - 106 mmol/L Final   • Total CO2 10/14/2019 23  20 - 29 mmol/L Final   • Calcium 10/14/2019 9.8  8.7 - 10.3 mg/dL Final   • Total Protein 10/14/2019 7.4  6.0 - 8.5 g/dL Final   • Albumin 10/14/2019 4.8  3.6 - 4.8 g/dL Final   • Globulin 10/14/2019 2.6  1.5 - 4.5 g/dL Final   • A/G Ratio 10/14/2019 1.8  1.2 - 2.2 Final   • Total Bilirubin 10/14/2019 <0.2  0.0 - 1.2 mg/dL Final   • Alkaline Phosphatase 10/14/2019 55  39 - 117 IU/L Final   • AST (SGOT) 10/14/2019 22  0 - 40 IU/L Final   • ALT (SGPT) 10/14/2019 15  0 - 32 IU/L Final       Assessment/Plan:       Problems Addressed this Visit        Nervous and Auditory    Multiple sclerosis (CMS/HCC) - Primary     MRI Brain is stable     Labs are unremarkable     Continue Aubagio          Neuropathic pain     Improved on GBP             Genitourinary    OAB (overactive bladder)       Other    Depression     Psychological condition is unchanged.  Medication changes per orders.  Psychological condition  will be reassessed in 3 months.      Start Buspar 5 mg BID    Continue Wellbutrin          Relevant  Medications    busPIRone (BUSPAR) 5 MG tablet

## 2019-12-02 RX ORDER — ERGOCALCIFEROL 1.25 MG/1
CAPSULE ORAL
Qty: 12 CAPSULE | Refills: 3 | Status: SHIPPED | OUTPATIENT
Start: 2019-12-02 | End: 2020-08-21 | Stop reason: SDUPTHER

## 2019-12-11 ENCOUNTER — TELEPHONE (OUTPATIENT)
Dept: NEUROLOGY | Facility: CLINIC | Age: 59
End: 2019-12-11

## 2019-12-11 NOTE — TELEPHONE ENCOUNTER
A verbal order was given to [facility] pharmacy for the pt's vitamin d #90 with 3 refills

## 2020-08-21 ENCOUNTER — OFFICE VISIT (OUTPATIENT)
Dept: NEUROLOGY | Facility: CLINIC | Age: 60
End: 2020-08-21

## 2020-08-21 ENCOUNTER — LAB (OUTPATIENT)
Dept: LAB | Facility: HOSPITAL | Age: 60
End: 2020-08-21

## 2020-08-21 VITALS
TEMPERATURE: 97.3 F | HEIGHT: 67 IN | BODY MASS INDEX: 29.51 KG/M2 | DIASTOLIC BLOOD PRESSURE: 78 MMHG | OXYGEN SATURATION: 98 % | WEIGHT: 188 LBS | HEART RATE: 78 BPM | SYSTOLIC BLOOD PRESSURE: 112 MMHG

## 2020-08-21 DIAGNOSIS — M79.2 NEUROPATHIC PAIN: ICD-10-CM

## 2020-08-21 DIAGNOSIS — N32.81 OAB (OVERACTIVE BLADDER): ICD-10-CM

## 2020-08-21 DIAGNOSIS — G35 MULTIPLE SCLEROSIS (HCC): Primary | ICD-10-CM

## 2020-08-21 DIAGNOSIS — F33.41 RECURRENT MAJOR DEPRESSIVE DISORDER, IN PARTIAL REMISSION (HCC): ICD-10-CM

## 2020-08-21 DIAGNOSIS — G35 MULTIPLE SCLEROSIS (HCC): ICD-10-CM

## 2020-08-21 PROCEDURE — 99214 OFFICE O/P EST MOD 30 MIN: CPT | Performed by: PSYCHIATRY & NEUROLOGY

## 2020-08-21 RX ORDER — BUPROPION HYDROCHLORIDE 150 MG/1
150 TABLET ORAL EVERY MORNING
Qty: 90 TABLET | Refills: 3 | Status: SHIPPED | OUTPATIENT
Start: 2020-08-21 | End: 2021-12-03 | Stop reason: SDUPTHER

## 2020-08-21 RX ORDER — GABAPENTIN 300 MG/1
CAPSULE ORAL
Qty: 720 CAPSULE | Refills: 1 | Status: SHIPPED | OUTPATIENT
Start: 2020-08-21 | End: 2021-02-22

## 2020-08-21 RX ORDER — ERGOCALCIFEROL 1.25 MG/1
50000 CAPSULE ORAL
Qty: 12 CAPSULE | Refills: 3 | Status: SHIPPED | OUTPATIENT
Start: 2020-08-21 | End: 2021-09-20 | Stop reason: SDUPTHER

## 2020-08-21 RX ORDER — BUPROPION HYDROCHLORIDE 300 MG/1
300 TABLET ORAL DAILY
Qty: 90 TABLET | Refills: 3 | Status: SHIPPED | OUTPATIENT
Start: 2020-08-21 | End: 2021-12-03 | Stop reason: SDUPTHER

## 2020-08-21 RX ORDER — LOSARTAN POTASSIUM 50 MG/1
50 TABLET ORAL 2 TIMES DAILY
COMMUNITY

## 2020-08-21 NOTE — PROGRESS NOTES
Subjective:   Chief Complaint   Patient presents with   • [facility]       Patient ID: Christiane Figueroa is a 60 y.o. female.    History of Present Illness     60 y.o. woman with RRMS, OAB and MDD returns in follow up.  Last visit 10/31/19 continued Aubagio,  mg BID and 1200 mg qhs, Wellbutrin, started Buspar.    MRI Brain, 10/31/19 as compared to 7/23/18 - no new/enlarging/enhancing lesions since 6/17/16; right frontal, right centrum semiovale PVWM, scattered T2 lesions      10/14/19:  CBC, CMP - NCS    RRMS     MSFC reviewed decreased SDMT 30 from 43.  Recommend swallow study. SLP and PT.      Feeling better with better control of DM.  Increased energy and able to do ADL's.      Tolerating Aubagio.      Previous DMD:  Rebif, Tecfiderrosario. Gilenya(macular edema).    MDD    CNS LS 16    Wellbutrin 450 mg daily improved mood.  Improved anxiety on xanax for MRI.       OAB    Controlled off meds.      Neuropathic pain     Leg pains are controlled on  mg        Past Medical History:   Diagnosis Date   • Anxiety    • Arthritis    • Depression    • Diabetes mellitus (CMS/HCC)    • Fatigue    • Hypertension    • Limb swelling     BLE, left leg worse   • LOM (loss of memory)    • Multiple sclerosis (CMS/HCC)    • Pain, joint, shoulder     Bilateral   • Vision problems        The following portions of the patient's history were reviewed and updated as appropriate: allergies, current medications, past medical history, past surgical history and problem list.    Review of Systems   Constitutional: Negative for activity change and unexpected weight change.   HENT: Positive for trouble swallowing. Negative for tinnitus.    Eyes: Negative for photophobia and visual disturbance.   Respiratory: Negative for apnea and choking.    Cardiovascular: Negative for leg swelling.   Endocrine: Positive for heat intolerance. Negative for cold intolerance.   Genitourinary: Positive for frequency and urgency. Negative for  "difficulty urinating and menstrual problem.   Musculoskeletal: Negative for back pain and gait problem.   Skin: Negative for color change.   Allergic/Immunologic: Negative for immunocompromised state.   Neurological: Positive for tremors and light-headedness. Negative for dizziness, seizures, syncope, facial asymmetry and speech difficulty.   Hematological: Negative for adenopathy. Does not bruise/bleed easily.   Psychiatric/Behavioral: Positive for decreased concentration and dysphoric mood. Negative for behavioral problems, confusion, hallucinations and sleep disturbance.        Objective:  Vitals:    08/21/20 1008   BP: 112/78   Pulse: 78   Temp: 97.3 °F (36.3 °C)   SpO2: 98%   Weight: 85.3 kg (188 lb)   Height: 170.2 cm (67\")       Neurologic Exam     Mental Status   Oriented to person, place, and time.   Follows 3 step commands.   Attention: decreased. Concentration: decreased.   Speech: speech is normal   Level of consciousness: alert  Knowledge: good and consistent with education. Unable to perform simple calculations.   Able to name object. Able to read. Able to repeat. Able to write. Normal comprehension.     Cranial Nerves     CN II   Visual fields full to confrontation.   Visual acuity: normal  Right visual field deficit: none  Left visual field deficit: none     CN III, IV, VI   Nystagmus: none   Diplopia: none  Ophthalmoparesis: none  Upgaze: normal  Downgaze: normal  Conjugate gaze: present    CN V   Facial sensation intact.   Right corneal reflex: normal  Left corneal reflex: normal    CN VII   Right facial weakness: none  Left facial weakness: none    CN VIII   Hearing: intact    CN IX, X   Palate: symmetric  Right gag reflex: normal  Left gag reflex: normal    CN XI   Right sternocleidomastoid strength: normal  Left sternocleidomastoid strength: normal    CN XII   Tongue: not atrophic  Fasciculations: absent  Tongue deviation: none    Motor Exam   Muscle bulk: normal  Overall muscle tone: " increased  Right arm tone: normal  Left arm tone: normal  Right leg tone: increased  Left leg tone: increased    Strength   Strength 5/5 throughout.   Right neck flexion: 5/5  Left neck flexion: 5/5  Right neck extension: 5/5  Left neck extension: 5/5  Right deltoid: 5/5  Left deltoid: 5/5  Right biceps: 5/5  Left biceps: 5/5  Right triceps: 5/5  Left triceps: 5/5  Right wrist flexion: 5/5  Left wrist flexion: 5/5  Right wrist extension: 5/5  Left wrist extension: 5/5  Right interossei: 5/5  Left interossei: 5/5  Right abdominals: 5/5  Left abdominals: 5/5  Right iliopsoas: 4/5  Left iliopsoas: 5/5  Right quadriceps: 4/5  Left quadriceps: 5/5  Right hamstrin/5  Left hamstrin/5  Right glutei: 4/5  Left glutei: 5/5  Right anterior tibial: 4/5  Left anterior tibial: 5/5  Right posterior tibial: 4/5  Left posterior tibial: 5/5  Right peroneal: 4/5  Left peroneal: 5/5  Right gastroc: 4/5  Left gastroc: 5/5    Sensory Exam   Light touch normal.     Gait, Coordination, and Reflexes     Gait  Gait: shuffling and wide-based    Tremor   Resting tremor: absent  Intention tremor: present  Action tremor: left arm and right arm      Physical Exam   Constitutional: She is oriented to person, place, and time.   Neurological: She is oriented to person, place, and time. She has normal strength.   Psychiatric: Her speech is normal.     Orders Only on 10/14/2019   Component Date Value Ref Range Status   • WBC 10/14/2019 4.8  3.4 - 10.8 x10E3/uL Final   • RBC 10/14/2019 4.39  3.77 - 5.28 x10E6/uL Final   • Hemoglobin 10/14/2019 13.1  11.1 - 15.9 g/dL Final   • Hematocrit 10/14/2019 40.5  34.0 - 46.6 % Final   • MCV 10/14/2019 92  79 - 97 fL Final   • MCH 10/14/2019 29.8  26.6 - 33.0 pg Final   • MCHC 10/14/2019 32.3  31.5 - 35.7 g/dL Final   • RDW 10/14/2019 13.4  12.3 - 15.4 % Final   • Platelets 10/14/2019 193  150 - 450 x10E3/uL Final   • Neutrophil Rel % 10/14/2019 59  Not Estab. % Final   • Lymphocyte Rel % 10/14/2019 24   Not Estab. % Final   • Monocyte Rel % 10/14/2019 10  Not Estab. % Final   • Eosinophil Rel % 10/14/2019 6  Not Estab. % Final   • Basophil Rel % 10/14/2019 1  Not Estab. % Final   • Neutrophils Absolute 10/14/2019 2.8  1.4 - 7.0 x10E3/uL Final   • Lymphocytes Absolute 10/14/2019 1.1  0.7 - 3.1 x10E3/uL Final   • Monocytes Absolute 10/14/2019 0.5  0.1 - 0.9 x10E3/uL Final   • Eosinophils Absolute 10/14/2019 0.3  0.0 - 0.4 x10E3/uL Final   • Basophils Absolute 10/14/2019 0.1  0.0 - 0.2 x10E3/uL Final   • Immature Granulocyte Rel % 10/14/2019 0  Not Estab. % Final   • Immature Grans Absolute 10/14/2019 0.0  0.0 - 0.1 x10E3/uL Final   • Glucose 10/14/2019 100* 65 - 99 mg/dL Final   • BUN 10/14/2019 18  8 - 27 mg/dL Final   • Creatinine 10/14/2019 0.88  0.57 - 1.00 mg/dL Final   • eGFR Non  Am 10/14/2019 72  >59 mL/min/1.73 Final   • eGFR African Am 10/14/2019 83  >59 mL/min/1.73 Final   • BUN/Creatinine Ratio 10/14/2019 20  12 - 28 Final   • Sodium 10/14/2019 143  134 - 144 mmol/L Final   • Potassium 10/14/2019 4.8  3.5 - 5.2 mmol/L Final   • Chloride 10/14/2019 102  96 - 106 mmol/L Final   • Total CO2 10/14/2019 23  20 - 29 mmol/L Final   • Calcium 10/14/2019 9.8  8.7 - 10.3 mg/dL Final   • Total Protein 10/14/2019 7.4  6.0 - 8.5 g/dL Final   • Albumin 10/14/2019 4.8  3.6 - 4.8 g/dL Final   • Globulin 10/14/2019 2.6  1.5 - 4.5 g/dL Final   • A/G Ratio 10/14/2019 1.8  1.2 - 2.2 Final   • Total Bilirubin 10/14/2019 <0.2  0.0 - 1.2 mg/dL Final   • Alkaline Phosphatase 10/14/2019 55  39 - 117 IU/L Final   • AST (SGOT) 10/14/2019 22  0 - 40 IU/L Final   • ALT (SGPT) 10/14/2019 15  0 - 32 IU/L Final       Assessment/Plan:       Problems Addressed this Visit        Nervous and Auditory    Multiple sclerosis (CMS/HCC) - Primary     Sx improved on Aubagio and improved control with wt loss    CBC, CMP    MRI Brain/cervical          Relevant Medications    gabapentin (NEURONTIN) 300 MG capsule    Other Relevant Orders     FL video swallow w speech    Ambulatory Referral to Speech Therapy    Ambulatory Referral to Physical Therapy Evaluate and treat    CBC & Differential    Comprehensive Metabolic Panel    Neuropathic pain     GBP controlling sx.              Genitourinary    OAB (overactive bladder)     Sx stable             Other    Depression     Psychological condition is unchanged.  Continue current treatment regimen.  Psychological condition  will be reassessed at the next regular appointment.         Relevant Medications    buPROPion XL (WELLBUTRIN XL) 150 MG 24 hr tablet    buPROPion XL (WELLBUTRIN XL) 300 MG 24 hr tablet

## 2020-08-22 LAB
ALBUMIN SERPL-MCNC: 4.6 G/DL (ref 3.8–4.8)
ALBUMIN/GLOB SERPL: 1.7 {RATIO} (ref 1.2–2.2)
ALP SERPL-CCNC: 59 IU/L (ref 39–117)
ALT SERPL-CCNC: 16 IU/L (ref 0–32)
AST SERPL-CCNC: 19 IU/L (ref 0–40)
BASOPHILS # BLD AUTO: 0.1 X10E3/UL (ref 0–0.2)
BASOPHILS NFR BLD AUTO: 1 %
BILIRUB SERPL-MCNC: 0.3 MG/DL (ref 0–1.2)
BUN SERPL-MCNC: 19 MG/DL (ref 8–27)
BUN/CREAT SERPL: 32 (ref 12–28)
CALCIUM SERPL-MCNC: 9.7 MG/DL (ref 8.7–10.3)
CHLORIDE SERPL-SCNC: 99 MMOL/L (ref 96–106)
CO2 SERPL-SCNC: 28 MMOL/L (ref 20–29)
CREAT SERPL-MCNC: 0.6 MG/DL (ref 0.57–1)
EOSINOPHIL # BLD AUTO: 0.2 X10E3/UL (ref 0–0.4)
EOSINOPHIL NFR BLD AUTO: 4 %
ERYTHROCYTE [DISTWIDTH] IN BLOOD BY AUTOMATED COUNT: 12.2 % (ref 11.7–15.4)
GLOBULIN SER CALC-MCNC: 2.7 G/DL (ref 1.5–4.5)
GLUCOSE SERPL-MCNC: 95 MG/DL (ref 65–99)
HCT VFR BLD AUTO: 42 % (ref 34–46.6)
HGB BLD-MCNC: 13.6 G/DL (ref 11.1–15.9)
IMM GRANULOCYTES # BLD AUTO: 0 X10E3/UL (ref 0–0.1)
IMM GRANULOCYTES NFR BLD AUTO: 0 %
LYMPHOCYTES # BLD AUTO: 1.2 X10E3/UL (ref 0.7–3.1)
LYMPHOCYTES NFR BLD AUTO: 30 %
Lab: NORMAL
MCH RBC QN AUTO: 30.6 PG (ref 26.6–33)
MCHC RBC AUTO-ENTMCNC: 32.4 G/DL (ref 31.5–35.7)
MCV RBC AUTO: 95 FL (ref 79–97)
MONOCYTES # BLD AUTO: 0.4 X10E3/UL (ref 0.1–0.9)
MONOCYTES NFR BLD AUTO: 9 %
NEUTROPHILS # BLD AUTO: 2.2 X10E3/UL (ref 1.4–7)
NEUTROPHILS NFR BLD AUTO: 56 %
PLATELET # BLD AUTO: 191 X10E3/UL (ref 150–450)
POTASSIUM SERPL-SCNC: 3.9 MMOL/L (ref 3.5–5.2)
PROT SERPL-MCNC: 7.3 G/DL (ref 6–8.5)
RBC # BLD AUTO: 4.44 X10E6/UL (ref 3.77–5.28)
SODIUM SERPL-SCNC: 141 MMOL/L (ref 134–144)
WBC # BLD AUTO: 3.9 X10E3/UL (ref 3.4–10.8)

## 2021-02-01 NOTE — ASSESSMENT & PLAN NOTE
CARDIOLOGY INPATIENT CONSULTATION NOTE        DATE OF SERVICE 2/1/2021    REFERRING PROVIDER:  Naa Edwards MD    REASON FOR THE CONSULT: afib.    HISTORY    Ramses Mohan is a 89 year old male  with a past medical history / problem list significant or consistent with  RAD, multiple myeloma,apparently  in remission ;hx of  CHF ; HTN COPD      Currently admitted on 1/30/2021  With shortness of breath  As per hospitalist service : \"     presenting to the hospital with shortness of breath. He is a poor historian and history obtained mostly from review of records and discussion with ER physician. Unclear if patient has underlying dementia but it was recently (1/25/21) documented by NP at the facility that he resides that     \"Of note, Favio was recently started on sertraline due to noted \"sousa\" behavior with being demanding and \"short\" with staff.  He was also noted to not be sleeping well.  His mood has improved and Favio has been noted to be more cooperative with cares in the last 1-2 weeks.\"     Per ER physician, patient was recently diagnosed with pneumonia and started on antibiotics but I do not see this reflected in nursing home records. There is a communication from yesterday stating that \"he coughed up a small amount of dark brown bloody phlegm.  Lungs are clear with diminished bases.  O2 sat 98% on 3 L nasal cannula.  Resident denies chest pain or increased shortness of breath stating that he feels good\"     Temperature was noted to be 98.7 ° F with pulse of 84 respiratory rate of 20 and blood pressure of 118/64 at that time.     Today, shortness of breath much worse and patient was transferred to the emergency department.  At baseline he is on 3 L of nasal cannula at all times.  He is noted to be in significant respiratory distress on arrival and was treated with bronchodilators as well as steroids.  He was eventually placed on BiPAP due to increased work of breathing and the time of exam he appears to be much  Psychological condition is worsening.  Medication changes per orders.  Psychological condition  will be reassessed at the next regular appointment     Debilitating anxiety    Continue Welbutrin and add Remeron .   more comfortable but is a poor historian as mentioned above.\".        Telemetry noted: New onset afib in the setting of admission with pneumonia/ and multiple myeloma on no treatment. Has been considered for palliative care    Recent Labs   Lab 02/01/21  0516 01/31/21  0524 01/30/21  1232 01/30/21  0910   POTASSIUM 4.2 3.8  --  4.2   CREATININE 0.97 0.91  --  0.84   GLUCOSE 146* 146*  --  167*   RAPDTR  --   --  0.12* 0.08*     Recent Labs   Lab 02/01/21  0516 01/31/21  0524 01/30/21  0910   WBC 14.4* 15.9* 14.9*   HGB 8.9* 8.4* 10.5*    160 237     TSH (mcUnits/mL)   Date Value   08/04/2020 0.484   03/05/2019 1.307       CARDIOLOGY CONSULTATION REQUESTED FOR FURTHER EVALUATION       ALLERGIES    ALLERGIES:  No Known Allergies    CURRENT MEDICATIONS    • magnesium oxide  400 mg Oral Q8H   • [START ON 2/2/2021] umeclidinium bromide  1 puff Inhalation Daily Resp   • iohexol  75 mL Intravenous Once   • albuterol  2.5 mg Nebulization 4x Daily Resp   • acyclovir  400 mg Oral BID   • aspirin  81 mg Oral Nightly   • atorvastatin  80 mg Oral Nightly   • fluticasone  2 spray Each Nare Daily   • fluticasone-vilanterol  1 puff Inhalation Daily Resp   • pantoprazole  40 mg Oral BID   • sertraline  25 mg Oral Daily   • cefepime (MAXIPIME) IVPB  1,000 mg Intravenous 3 times per day   • doxycycline  100 mg Intravenous 2 times per day   • VANCOMYCIN - PHARMACIST MONITORED   Does not apply See Admin Instructions   • [Held by provider] enoxaparin  40 mg Subcutaneous Daily   • vancomycin (VANCOCIN) IVPB  1,250 mg Intravenous 2 times per day   • sodium chloride (PF)  2 mL Intracatheter 2 times per day   • Potassium Standard Replacement Protocol   Does not apply See Admin Instructions   • Magnesium Standard Replacement Protocol   Does not apply See Admin Instructions     • metoPROLOL (LOPRESSOR) injection 5 mg  5 mg Intravenous Q5 Min PRN For total dose see MAR   • benzonatate (TESSALON PERLES) capsule 100 mg  100 mg Oral TID PRN  For total dose see MAR   • HYDROcodone-acetaminophen (NORCO) 5-325 MG per tablet 1 tablet  1 tablet Oral Q6H PRN For total dose see MAR   • hypromellose (GENTEAL) 0.3 % ophthalmic gel 1-2 drop  1-2 drop Both Eyes Daily PRN For total dose see MAR   • acetaminophen (TYLENOL) tablet 650 mg  650 mg Oral Q6H PRN For total dose see MAR   • melatonin tablet 3 mg  3 mg Oral Nightly PRN For total dose see MAR   • calcium carbonate (TUMS) chewable tablet 500-1,000 mg  500-1,000 mg Oral Q4H PRN For total dose see MAR   • sodium chloride 0.9 % flush bag 25 mL  25 mL Intravenous PRN For total dose see MAR   • sodium chloride (NORMAL SALINE) 0.9 % bolus 500 mL  500 mL Intravenous PRN For total dose see MAR   • polyethylene glycol (MIRALAX) packet 17 g  17 g Oral Daily PRN For total dose see MAR   • docusate sodium-sennosides (SENOKOT S) 50-8.6 MG 2 tablet  2 tablet Oral Daily PRN For total dose see MAR   • bisacodyl (DULCOLAX) suppository 10 mg  10 mg Rectal Daily PRN For total dose see MAR       HOME MEDICATIONS  Medications Prior to Admission   Medication Sig Dispense Refill   • polyethylene glycol (MIRALAX) 17 g packet Take 17 g by mouth daily. Stir and dissolve powder in any 4 to 8 ounces of beverage, then drink.     • azithromycin (ZITHROMAX) 250 MG tablet T%vinh 2 tablets (500 mg) by mouth on the first day, then take 1 tablet by mouth daily for 4 more days.     • CARBOXYMethylcellulose (REFRESH PLUS) 0.5 % ophthalmic solution Place 1 drop into both eyes 2 times daily.     • polyethylene glycol (MIRALAX) 17 GM/SCOOP powder Take 17 g by mouth daily. Stir and dissolve powder in any 4 to 8 ounces of beverage, then drink. (Patient taking differently: Take 17 g by mouth daily as needed. Stir and dissolve powder in any 4 to 8 ounces of beverage, then drink.) 255 g 0   • sertraline (ZOLOFT) 25 MG tablet Take 1 tablet by mouth daily. 30 tablet 0   • pantoprazole (PROTONIX) 40 MG tablet Take 1 tablet by mouth 2 times daily. 60 tablet  0   • HYDROcodone-acetaminophen (NORCO) 5-325 MG per tablet Take 1 tablet by mouth every 6 hours as needed for Pain. 40 tablet 0   • calcium carbonate (TUMS) 500 MG chewable tablet Chew 1-2 tablets by mouth every 4 hours as needed for Heartburn.     • Eyelid Cleansers (OcuSoft Eyelid Cleansing) Pads Apply topically daily.     • benzonatate (TESSALON PERLES) 100 MG capsule Take 100 mg by mouth 3 times daily as needed for Cough.     • albuterol-ipratropium (COMBIVENT RESPIMAT) 100-20 MCG/ACT inhaler Inhale 1 puff into the lungs 2 times daily. 4 g 12   • dexamethasone (DECADRON) 4 MG tablet Take 5 tablets by mouth 1 day a week. 20 tablet 0   • Calcium Carbonate 1500 (600 Ca) MG Tab TAKE ONE TABLET BY MOUTH TWICE DAILY 60 tablet 5   • fluticasone-vilanterol (BREO ELLIPTA) 100-25 MCG/INH inhaler Inhale 1 puff into the lungs daily.     • atorvastatin (LIPITOR) 80 MG tablet Take 1 tablet by mouth nightly. 30 tablet 0   • Cholecalciferol (VITAMIN D3) 50 mcg (2,000 units) capsule TAKE ONE CAPSULE BY MOUTH EVERY DAY 31 capsule 2   • aspirin (ECOTRIN) 81 MG EC tablet Take 81 mg by mouth nightly.     • hypromellose (GENTEAL) 0.3 % ophthalmic gel Place 1-2 drops into both eyes daily as needed.      • acyclovir (ZOVIRAX) 400 MG tablet Take 1 tablet by mouth 2 times daily. 120 tablet 6   • hydrochlorothiazide (MICROZIDE) 12.5 MG capsule Take 12.5 mg by mouth every other day.      • fluticasone (FLONASE) 50 MCG/ACT nasal spray Spray 2 sprays in each nostril daily.      • docusate sodium-sennosides (SENOKOT S) 50-8.6 MG per tablet Take 1 tablet by mouth nightly.     • Dextran 70-Hypromellose, PF, 0.1-0.3 % Solution Apply 1 drop to eye 4 times daily as needed (dry eyes).      • albuterol 108 (90 Base) MCG/ACT inhaler Inhale 2 puffs into the lungs every 4 to 6 hours as needed for Shortness of Breath or Wheezing.      • Constulose 10 GM/15ML solution TAKE 2 TABLESPOONSFUL (30ML) BY MOUTH TWICE DAILY AS NEEDED 480 mL 0       MEDICAL  HISTORY    Past Medical History:   Diagnosis Date   • Acute on chronic respiratory failure with hypoxia (CMS/HCC) 2017   • MIRELA (acute kidney injury) (CMS/HCC) 2017    on dialysis   • ARF (acute renal failure) (CMS/HCC) 2017   • Chronic obstructive pulmonary disease with acute exacerbation (CMS/HCC) 2017   • Community acquired pneumonia of right lower lobe of lung 2019   • COPD (chronic obstructive pulmonary disease) (CMS/HCC)     chronic respiratory failure   • Elevated d-dimer 4/3/2019   • Elevated troponin 2017   • Essential (primary) hypertension    • Hemodialysis status (CMS/HCC)     from 17-17   • Hemoptysis 2017   • HF (heart failure), diastolic (CMS/HCC)    • Hypokalemia 2020   • Hypomagnesemia 2017   • Multiple drug resistant organism (MDRO) culture positive 2017    urine   • Multiple myeloma (CMS/HCC) 2017   • Multiple myeloma in remission (CMS/HCC)    • Multiple myeloma in remission (CMS/HCC)    • Pneumonia    • Possible Viral bronchitis 2020   • RAD (reactive airway disease)    • Renal failure 2017    acute   • SIRS (systemic inflammatory response syndrome) (CMS/HCC) 2019       SURGICAL HISTORY    Past Surgical History:   Procedure Laterality Date   • Bronchoscopy      x2   • Ir temporary dialysis catheter  2017   • Permacath  2017    removal of permacath       SOCIAL HISTORY    Social History     Tobacco Use   • Smoking status: Former Smoker     Packs/day: 1.00     Years: 68.00     Pack years: 68.00     Types: Cigarettes     Quit date: 2016     Years since quittin.7   • Smokeless tobacco: Never Used   Substance Use Topics   • Alcohol use: No     Frequency: Never     Drinks per session: 1 or 2     Binge frequency: Never   • Drug use: No       FAMILY HISTORY    Family History   Problem Relation Age of Onset   • Heart disease Mother    • Heart disease Father    • Cancer, Colon Father        REVIEW OF SYSTEMS     Constitutional:  Patient denies fever, chills, fatigue or malaise.    Eyes:  Denies change in visual acuity, pain, burning or itching.    Immunologic:  Denies hives, seasonal allergies.  HENT:  Denies sinus problems, ear infections, nasal congestion or sore throat.    Respiratory:  Denies cough, shortness of breath, sputum production, hemoptysis, wheezing.    Cardiovascular:  Denies chest pain, dyspnea, orthopnea, paroxysmal nocturnal dyspnea or edema.  No palpitations, presyncope or syncope.  GI:  Denies abdominal pain, nausea, vomiting, diarrhea, constipation, melena or hematochezia.    :  Denies urine retention, dysuria, urinary frequency, hematuria or nocturia.    Musculoskeletal:  Denies back pain, neck pain, joint pain, myalgias.  Integument:  Denies rash, itching.    Neurologic:  Denies headache, focal loss of motor strength or sensation  Endocrine:  Denies polyuria, polydipsia, or heat or cold intolerance.    Lymphatic:  Denies swollen glands, weight loss.    All other systems reviewed and negative      PHYSICAL EXAM      Vital Last Value 24 Hour Range   Temperature 97.3 °F (36.3 °C) (02/01/21 1124) Temp  Min: 97.3 °F (36.3 °C)  Max: 99.5 °F (37.5 °C)   Pulse (!) 122 (02/01/21 1305) Pulse  Min: 86  Max: 123   Respiratory (!) 37 (02/01/21 1305) Resp  Min: 16  Max: 42   Non-Invasive  Blood Pressure 100/70 (02/01/21 1305) BP  Min: 84/49  Max: 131/63   Pulse Oximetry 93 % (02/01/21 1305) SpO2  Min: 89 %  Max: 96 %   Arterial   Blood Pressure   No data recorded     Weight    01/30/21 0858 01/30/21 1547   Weight: 78 kg 75.8 kg     Body mass index is 22.05 kg/m².  I/O last 3 completed shifts:  In: 180 [P.O.:180]  Out: 100 [Urine:100]    Constitutional:  White  male   Skin: Warm and dry. No rash.    HEENT:  Normocephalic. Atraumatic. Conjunctiva pink, sclerae anicteric.  Bilateral external ears normal. Oropharynx clear.  Nose normal.   Neck: Normal range of motion. No tenderness. mild JVD.  Carotids 2+ without  bruits. No thyromegaly.  Lungs:   No respiratory distress. Normal breath sounds. No crackles, wheezing or rhonchi.  Heart: : The auscultation of the precordium shows irregular rate and rhythm. Normal S1, S2. . No S3 or S4. 1/6 PARKER aortic area. No rub or click. PMI difficult to be assesed  Abdomen:  Normal bowel sounds.  Soft, non-tender. No masses. No hepatosplenomegaly.  No bruit.   Extremities:  No clubbing, cyanosis or edema.  2+ dorsalis pedis and posterior tibial pulses bilaterally.  Neurologic:  Alert & oriented x 3. Normal affect and mood.  No gross motor deficits.  Back:   No kyphosis or scoliosis.    LABS    Recent Labs   Lab 01/30/21  1232 01/30/21  0910   RAPDTR 0.12* 0.08*     Recent Labs   Lab 02/01/21  0516 01/31/21  0524 01/30/21  0910   SODIUM 143 143 141   POTASSIUM 4.2 3.8 4.2   CHLORIDE 106 105 99   CO2 34* 35* 38*   BUN 39* 29* 20   CREATININE 0.97 0.91 0.84   GLUCOSE 146* 146* 167*   CALCIUM 8.8 8.7 9.7     Recent Labs   Lab 02/01/21  0516 01/31/21  0524 01/30/21  0910   WBC 14.4* 15.9* 14.9*   HGB 8.9* 8.4* 10.5*   HCT 29.1* 27.4* 34.3*    160 237     TRIGLYCERIDE (mg/dL)   Date Value   04/16/2017 111      TSH (mcUnits/mL)   Date Value   08/04/2020 0.484   03/05/2019 1.307      No results found    IMAGING    Xr Chest Ap Or Pa    Result Date: 1/30/2021  Narrative: INTERPRETATION LOCATION: [facility] Portable chest x-ray January 30, 2021. 0911 hours. COMPARISON: CT August. Chest x-ray August. CLINICAL INDICATION: Shortness of breath. FINDINGS: The cardiac and mediastinal silhouettes are stable. Negative for pneumothorax. Small left effusion. Patchy infiltrates are noted bilateral lungs left greater than right.     Impression: IMPRESSION: Small left pleural effusion and left greater than right patchy infiltrates at the lung bases. Signed by: Bruno Vasquez     Ct Chest Pe Imaging    Result Date: 2/1/2021  Narrative: INTERPRETATION LOCATION: [facility] CTA chest  with IV contrast February 1, 2021. COMPARISON: Radiograph January 30. CT August 3. CLINICAL INDICATION: Shortness of breath. FINDINGS: MIP images. Individualized dose optimization technique was used for the procedure performed.. 75 mL Omnipaque 350 contrast material. There are some centrilobular upper lobe emphysematous changes redemonstrated. Anterior right upper lobe calcified granuloma is similar to previous. Some loosely grouped ill-defined micronodules are noted posterior lateral right upper lobe, most likely inflammatory, small region involved. There is some motion artifact through this region also. There is right atelectasis. There is a small right pleural effusion. Left side shows a large effusion is increased compared with the previous. There is some degree of atelectasis. However there is also a left lower lobe process with multiple air-fluid levels and a necrotic appearance consistent with an extensive infectious process such as a necrotizing pneumonia. Only portions of the superior segment of the left lower lobe are preserved. The abnormal left lung region is sizable with measurements of 11 x 8 cm AP and transverse. The craniocaudal dimension is 6 cm. Upper abdomen does not show adrenal gland enlargement. Tiny amount of fluid is noted adjacent to the spleen. No axillary adenopathy. Spinal alignment is intact. There is some wedging of T8 superior endplate with some sclerosis suggesting subacute. The amount of height loss is a bit more than on the exam from last year.     Impression: IMPRESSION: Very small region of nodular likely inflammatory disease posterior lateral right upper lobe. Extensive abnormality in the left lower lobe most likely a necrotizing process with lung destruction and air-fluid levels possibly developing into a lung abscess. Large left pleural effusion. T8 height loss has progressed indicating an increasing compression fracture, with sclerosis indicating that it is subacute or  healing. Signed by: Bruno Vasquez       ECG INTERPRETATION  Results for orders placed or performed during the hospital encounter of 21   Electrocardiogram 12-Lead   Result Value Ref Range    REPORT TEXT                               [facility]                                           Test Date:    2021               Test Time:    01:00:24  Pat Name:     MEGHAN COKER           Department:     Patient ID:   [id number]                  Room:         Formerly named [facility]  Gender:       Male                     Technician:   [facility]  :          [id number]               Requested By:   Order Number:                          Reading MD:                                      Measurements  Intervals                              Axis            Rate:         116                      P:              FL:                                    QRS:          -42  QRSD:         92                       T:            49  QT:           354                                      QTc:          492                                                                 Interpretive Statements  Atrial flutter with variable AV block with premature ventricular or  aberrantly conducted complexes  Left axis deviation   Low voltage QRS  Possible Inferior infarct , age undetermined             Atrial flutter with variable AV block with premature ventricular or   aberrantly   conducted complexes   Left axis deviation   Low voltage QRS   Possible Inferior infarct , age undetermined   Normal ECG     TELEMETRY        Active Hospital Problems    Diagnosis   • Multiple myeloma not having achieved remission (CMS/HCC)   • Multiple myeloma (CMS/HCC)     Admitted to [facility] 4/10/2017 with COPD exacerbation developed acute renal failure and prior to admission his creatinine was normal, he was noticed to have high total protein and globulin for which reason multiple myeloma workup was done and revealed that he has monoclonal gammopathy 3.5 gm/dL  IgG lambda monoclonal protein.     During the hospitalization he was started on hemodialysis, he had normal calcium level.     4/20/2017 bone marrow biopsy was done and revealed:  - Normocellular bone marrow with plasma cell myeloma (at least 50% plasma cells).     - Small monoclonal B-cell population detected by flow cytometry only; no morphologic evidence of B-cell lymphoma.       - Peripheral blood with anemia and rouleaux.   - FISH multiple myeloma panel positive for 1q21 amplification and abnormal for deletion of the 13q14.3 region.      4/22/2017 skeletal survey was done and revealed no pathologic fracture or lytic or blastic lesion. There are some mild biconcave deformities in the thoracic spine which are likely chronic.     Because of the high percentage of plasma cells, high kappa lambda ratio and the presence of acute renal failure I decided to start his multiple myeloma treatment although his acute renal failure could be unrelated to multiple myeloma.     4/25/2017: he was started on Velcade and dexamethasone.        GOAL OF TREATMENT:  Palliative.      • Dysphagia, oropharyngeal phase   • Acute on chronic respiratory failure with hypoxia and hypercapnia (CMS/HCC)   • Severe sepsis (CMS/HCC)   • Pneumonia of both lower lobes due to infectious organism   • CKD (chronic kidney disease) stage 3, GFR 30-59 ml/min (CMS/HCC)   • Chronic diastolic heart failure (CMS/HCC)   • Troponin level elevated   • Chronic respiratory failure with hypoxia (CMS/HCC)   • Essential hypertension, benign     Ramses Mohan is a 89 year old male admitted on 1/30/2021 with the following problem list:    Principal Problem:    Acute on chronic respiratory failure with hypoxia and hypercapnia (CMS/HCC)  Active Problems:    Multiple myeloma (CMS/HCC)    Multiple myeloma not having achieved remission (CMS/HCC)    Essential hypertension, benign    Chronic respiratory failure with hypoxia (CMS/HCC)    Troponin level elevated    Chronic  diastolic heart failure (CMS/HCC)    CKD (chronic kidney disease) stage 3, GFR 30-59 ml/min (CMS/HCC)    Severe sepsis (CMS/HCC)    Pneumonia of both lower lobes due to infectious organism    Dysphagia, oropharyngeal phase        ASSESSMENT&PLAN    1.  New onset afib in the setting of admission with pneumonia/ and multiple myeloma on no treatment. Has been considered for palliative care . Elevated trop 0.12 possible NSTEMI type II.    COVID negative.on admission 1/30/21.    Poor prognosis in the setting of advanced age and acute resp failure.    Atrial fib/ atrial flutter  with no acute repolarization abnormalities  No active chest pain  No VTach  No syncope     Fluid retention/ CHF. NT BNP 1,941.      Recent Labs   Lab 02/01/21  0516 01/31/21  0524 01/30/21  1232 01/30/21  0910   POTASSIUM 4.2 3.8  --  4.2   CREATININE 0.97 0.91  --  0.84   GLUCOSE 146* 146*  --  167*   RAPDTR  --   --  0.12* 0.08*     Recent Labs   Lab 02/01/21  0516 01/31/21  0524 01/30/21  0910   WBC 14.4* 15.9* 14.9*   HGB 8.9* 8.4* 10.5*    160 237     TSH (mcUnits/mL)   Date Value   08/04/2020 0.484   03/05/2019 1.307       2Decho on  8/5/2020     , reviewed personally  \" EF is visually estimated at 55-60% .  Normal LV Size and function  Severe LV concentric hypertrophy suggests HTN heart disease vs infiltrative ; Process HCM cannot be ruled out based on today's imaging, though no significant outflow trace gradient is seen on today's study  No significant valve dx seen, though aortic valve calcification rasies some concern for aortic stenosis (objective data not consistent with significant  Stenosis) . In imaging available, no significant change from echo in 2017\"     Plan:  Education re afib / treat co morbid conditions; pneumonia and resp failure first.  Advise conservative managent.  Overall poor prognosis in the setting of advanced age and comorbidities.    Rate control is advised in this case.  Risks vs benefits re anticoagulation   discussed.  2D echo as above ; may repeat a more current echo.  Diurese with IV  Lasix as needed.   Monitor renal fx.  Replace electrolytes; keep Mg >2; K>4.0    Check daily weights. Check intake/ output  Heart healthy diet.  Dietary consult; to address dietary indiscretions.  Fluid and salt restriction  CHF education.  Pressure stockings if LE edema. On daytime/ off bedtime.    Address precipitating risk factors like sleep apnea, BP control, correct anemia, optimize COPD if present, optimize glucose control if diabetic, optimize CKD care, reduce weight if obese, heart rate control if AF  Refer to Heart Failure Clinic at time of discharge  Refer for Cardiac rehab and exercise training    Advise sleep test as outpatient if/ daytime sleepiness         To address NSTEMI type II / elevated troponin 0.12    Advise ASA, high dose statins , lovenox  ; cont metoprolol     MPI/SPECT pharmacological  To stratify risk after discharge if patient and family want to pursue further investigation, otherwise advise conservative management due to advanced age and comorbidities.    Can follow with me in the cardiology clinic after discharge     Findings and plan were discussed with primary team,and patient.   Further recommendations upon clinical progress and test results.    The cardiology service will follow the patient with you.     Total time spent with the patient was 50 minutes, with greater than 50% of that time spent in counseling, teaching and/or coordination of care.    Thank you very much Naa Edwards MD for involving me in the consultation and care of this patient. If there are any question or concerns please do not hesitate to contact us .      Farhat Moya MD, [facility]  Cardiologist  2/1/2021 2:03 PM

## 2021-02-08 ENCOUNTER — TELEPHONE (OUTPATIENT)
Dept: NEUROLOGY | Facility: CLINIC | Age: 61
End: 2021-02-08

## 2021-02-08 NOTE — TELEPHONE ENCOUNTER
Caller:   WOODY RAYA  Relationship to patient: PT    Best call back number: [phone]    Concerns or Questions if Applicable:  DUE TO MS DX AND MEDICATIONS PRESCRIBED  PT IS CONCERNED IF IT IS SAFE FOR HER TO RECEIVE VACCINE             PLEASE ADVISE ”

## 2021-02-08 NOTE — TELEPHONE ENCOUNTER
Called x1. LVM to let patient know Dr. Zelaya does recommend for our patients to get the covid vaccine. Left call back number in case patient has any further questions.

## 2021-02-22 ENCOUNTER — OFFICE VISIT (OUTPATIENT)
Dept: NEUROLOGY | Facility: CLINIC | Age: 61
End: 2021-02-22

## 2021-02-22 VITALS
BODY MASS INDEX: 28.88 KG/M2 | HEART RATE: 81 BPM | WEIGHT: 184 LBS | HEIGHT: 67 IN | DIASTOLIC BLOOD PRESSURE: 74 MMHG | OXYGEN SATURATION: 97 % | SYSTOLIC BLOOD PRESSURE: 120 MMHG

## 2021-02-22 DIAGNOSIS — N32.81 OAB (OVERACTIVE BLADDER): ICD-10-CM

## 2021-02-22 DIAGNOSIS — M79.2 NEUROPATHIC PAIN: Chronic | ICD-10-CM

## 2021-02-22 DIAGNOSIS — G35 MULTIPLE SCLEROSIS (HCC): Primary | Chronic | ICD-10-CM

## 2021-02-22 DIAGNOSIS — F33.41 RECURRENT MAJOR DEPRESSIVE DISORDER, IN PARTIAL REMISSION (HCC): Chronic | ICD-10-CM

## 2021-02-22 PROCEDURE — 99214 OFFICE O/P EST MOD 30 MIN: CPT | Performed by: PSYCHIATRY & NEUROLOGY

## 2021-02-22 RX ORDER — GABAPENTIN 300 MG/1
CAPSULE ORAL
Qty: 720 CAPSULE | Refills: 1 | Status: SHIPPED | OUTPATIENT
Start: 2021-02-22 | End: 2021-08-27 | Stop reason: SDUPTHER

## 2021-02-22 NOTE — PROGRESS NOTES
"Chief Complaint  Multiple Sclerosis    Subjective          Christiane Figueroa presents to [facility] NEUROLOGY for RRMS, MDD, neuropathic pain.    History of Present Illness    60 y.o. female returns in follow up.  Last visit on 8/21/20 continued Aubagio,  mg BID and 1200 mg qhs, Wellbutrin, ordered MRI Brain/cerivcal, labs     MRI Brain 10/31/19 as compared to 7/23/18 - no new/enlarging/enhancing lesions since 6/17/16; right frontal, right centrum semiovale PVWM, scattered T2 lesions       8/22/2020:  CBC, CMP - NCS     Did not have tests done due to Covid concerns.     RRMS     Right sided weakness increased and trouble using right hand.       Requesting PT/OT.    Tolerating Aubagio.       Previous DMD:  Rebif, Tecfidera. Gilenya(macular edema).     MDD      Mood is stable.     Wellbutrin 450 mg daily improved mood.      OAB     Controlled off meds.       Neuropathic pain      Leg pains are controlled on  mg         Objective   Vital Signs:   /74   Pulse 81   Ht 170.2 cm (67.01\")   Wt 83.5 kg (184 lb)   SpO2 97%   BMI 28.81 kg/m²     Physical Exam  Eyes:      Extraocular Movements: EOM normal.      Pupils: Pupils are equal, round, and reactive to light.   Neurological:      Mental Status: She is oriented to person, place, and time.      Gait: Gait is intact.      Deep Tendon Reflexes: Strength normal.   Psychiatric:         Speech: Speech normal.        Neurologic Exam     Mental Status   Oriented to person, place, and time.   Speech: speech is normal   Level of consciousness: alert  Knowledge: good and consistent with education.   Normal comprehension.     Cranial Nerves   Cranial nerves II through XII intact.     CN II   Visual fields full to confrontation.   Visual acuity: normal  Right visual field deficit: none  Left visual field deficit: none     CN III, IV, VI   Pupils are equal, round, and reactive to light.  Extraocular motions are normal.   Nystagmus: none "   Diplopia: none  Ophthalmoparesis: none  Upgaze: normal  Downgaze: normal  Conjugate gaze: present    CN V   Facial sensation intact.   Right corneal reflex: normal  Left corneal reflex: normal    CN VII   Right facial weakness: none  Left facial weakness: none    CN VIII   Hearing: intact    CN IX, X   Palate: symmetric  Right gag reflex: normal  Left gag reflex: normal    CN XI   Right sternocleidomastoid strength: normal  Left sternocleidomastoid strength: normal    CN XII   Tongue: not atrophic  Fasciculations: absent  Tongue deviation: none    Motor Exam   Muscle bulk: normal  Overall muscle tone: normal    Strength   Strength 5/5 throughout.     Sensory Exam   Light touch normal.     Gait, Coordination, and Reflexes     Gait  Gait: normal    Tremor   Resting tremor: absent  Intention tremor: absent  Action tremor: absent    Reflexes   Reflexes 2+ except as noted.        Result Review :   The following data was reviewed by: Yousuf Zelaya MD on 02/22/2021:  Common labs    Common Labsle 8/22/20 8/22/20    0000 0000   Glucose  95   BUN  19   Creatinine  0.60   eGFR Non  Am  99   eGFR African Am  114   Sodium  141   Potassium  3.9   Chloride  99   Calcium  9.7   Total Protein  7.3   Albumin  4.6   Total Bilirubin  0.3   Alkaline Phosphatase  59   AST (SGOT)  19   ALT (SGPT)  16   WBC 3.9    Hemoglobin 13.6    Hematocrit 42.0    Platelets 191                      Assessment and Plan    Diagnoses and all orders for this visit:    1. Multiple sclerosis (CMS/HCC) (Primary)  Assessment & Plan:  Increased right sided weakness    Continue Aubagio    Refer to PT/OT      Orders:  -     Ambulatory Referral to Physical Therapy Evaluate and treat  -     Ambulatory Referral to Occupational Therapy  -     CBC & Differential; Future  -     Comprehensive Metabolic Panel; Future  -     gabapentin (NEURONTIN) 300 MG capsule; Take two capsules am and pm, and four capsules at bedtime  Dispense: 720 capsule; Refill: 1    2.  Neuropathic pain  Assessment & Plan:  Continue GBP       3. Recurrent major depressive disorder, in partial remission (CMS/HCC)  Assessment & Plan:  Mood is stable       4. OAB (overactive bladder)      Follow Up   No follow-ups on file.  Patient was given instructions and counseling regarding her condition or for health maintenance advice. Please see specific information pulled into the AVS if appropriate.

## 2021-02-23 LAB
ALBUMIN SERPL-MCNC: 4.5 G/DL (ref 3.5–5.2)
ALBUMIN/GLOB SERPL: 1.9 G/DL
ALP SERPL-CCNC: 57 U/L (ref 39–117)
ALT SERPL-CCNC: 19 U/L (ref 1–33)
AST SERPL-CCNC: 25 U/L (ref 1–32)
BASOPHILS # BLD AUTO: 0.06 10*3/MM3 (ref 0–0.2)
BASOPHILS NFR BLD AUTO: 1.5 % (ref 0–1.5)
BILIRUB SERPL-MCNC: 0.3 MG/DL (ref 0–1.2)
BUN SERPL-MCNC: 16 MG/DL (ref 8–23)
BUN/CREAT SERPL: 24.6 (ref 7–25)
CALCIUM SERPL-MCNC: 9.5 MG/DL (ref 8.6–10.5)
CHLORIDE SERPL-SCNC: 101 MMOL/L (ref 98–107)
CO2 SERPL-SCNC: 27.4 MMOL/L (ref 22–29)
CREAT SERPL-MCNC: 0.65 MG/DL (ref 0.57–1)
EOSINOPHIL # BLD AUTO: 0.17 10*3/MM3 (ref 0–0.4)
EOSINOPHIL NFR BLD AUTO: 4.4 % (ref 0.3–6.2)
ERYTHROCYTE [DISTWIDTH] IN BLOOD BY AUTOMATED COUNT: 12.8 % (ref 12.3–15.4)
GLOBULIN SER CALC-MCNC: 2.4 GM/DL
GLUCOSE SERPL-MCNC: 104 MG/DL (ref 65–99)
HCT VFR BLD AUTO: 42.6 % (ref 34–46.6)
HGB BLD-MCNC: 13.8 G/DL (ref 12–15.9)
IMM GRANULOCYTES # BLD AUTO: 0.01 10*3/MM3 (ref 0–0.05)
IMM GRANULOCYTES NFR BLD AUTO: 0.3 % (ref 0–0.5)
LYMPHOCYTES # BLD AUTO: 1.18 10*3/MM3 (ref 0.7–3.1)
LYMPHOCYTES NFR BLD AUTO: 30.3 % (ref 19.6–45.3)
MCH RBC QN AUTO: 29.6 PG (ref 26.6–33)
MCHC RBC AUTO-ENTMCNC: 32.4 G/DL (ref 31.5–35.7)
MCV RBC AUTO: 91.4 FL (ref 79–97)
MONOCYTES # BLD AUTO: 0.34 10*3/MM3 (ref 0.1–0.9)
MONOCYTES NFR BLD AUTO: 8.7 % (ref 5–12)
NEUTROPHILS # BLD AUTO: 2.14 10*3/MM3 (ref 1.7–7)
NEUTROPHILS NFR BLD AUTO: 54.8 % (ref 42.7–76)
NRBC BLD AUTO-RTO: 0 /100 WBC (ref 0–0.2)
PLATELET # BLD AUTO: 181 10*3/MM3 (ref 140–450)
POTASSIUM SERPL-SCNC: 4.4 MMOL/L (ref 3.5–5.2)
PROT SERPL-MCNC: 6.9 G/DL (ref 6–8.5)
RBC # BLD AUTO: 4.66 10*6/MM3 (ref 3.77–5.28)
SODIUM SERPL-SCNC: 138 MMOL/L (ref 136–145)
WBC # BLD AUTO: 3.9 10*3/MM3 (ref 3.4–10.8)

## 2021-03-02 ENCOUNTER — TELEPHONE (OUTPATIENT)
Dept: NEUROLOGY | Facility: CLINIC | Age: 61
End: 2021-03-02

## 2021-03-02 NOTE — TELEPHONE ENCOUNTER
Caller: WOODY RAYA     Relationship: PT    Best call back number: [phone]    What medications are you currently taking:   Current Outpatient Medications on File Prior to Visit   Medication Sig Dispense Refill   • Biotin (BIOTIN 5000) 5 MG capsule biotin   5,000 MCG 1 QD     • buPROPion XL (WELLBUTRIN XL) 150 MG 24 hr tablet Take 1 tablet by mouth Every Morning. Take one tablet by mouth daily. 90 tablet 3   • buPROPion XL (WELLBUTRIN XL) 300 MG 24 hr tablet Take 1 tablet by mouth Daily. 90 tablet 3   • busPIRone (BUSPAR) 5 MG tablet Take 1 tablet by mouth 2 (Two) Times a Day. 60 tablet 5   • gabapentin (NEURONTIN) 300 MG capsule Take two capsules am and pm, and four capsules at bedtime 720 capsule 1   • losartan (COZAAR) 50 MG tablet Take 50 mg by mouth Daily.     • rizatriptan (MAXALT) 10 MG tablet Take one tablet as needed for headache. May repeat in 2 hours if needed.     • simvastatin (ZOCOR) 20 MG tablet Take 1 tablet by mouth daily.     • Teriflunomide (AUBAGIO PO) Aubagio   14 mg daily     • vitamin D (ERGOCALCIFEROL) 1.25 MG (83381 UT) capsule capsule Take 1 capsule by mouth Every 7 (Seven) Days. 12 capsule 3     No current facility-administered medications on file prior to visit.             Which medication are you concerned about: Teriflunomide (AUBAGIO PO)    Who prescribed you this medication: DR ENGEL     What are your concerns: PT STATES SHE IS CURRENTLY ON FINANCIAL ASSISTANCE FOR THIS MEDICATION .. THEY ARE REQUESTING A PRE AUTH AND HAVE ADVISED PT THAT THEY SENT OVER REQUEST PREVIOUSLY TO BE COMPLETED   PT NEEDS THIS COMPLETED IN ORDER TO [facility] FILLED IS OUT OF THIS MEDICATION

## 2021-08-27 ENCOUNTER — LAB (OUTPATIENT)
Dept: LAB | Facility: HOSPITAL | Age: 61
End: 2021-08-27

## 2021-08-27 ENCOUNTER — OFFICE VISIT (OUTPATIENT)
Dept: NEUROLOGY | Facility: CLINIC | Age: 61
End: 2021-08-27

## 2021-08-27 VITALS
DIASTOLIC BLOOD PRESSURE: 72 MMHG | OXYGEN SATURATION: 97 % | SYSTOLIC BLOOD PRESSURE: 116 MMHG | HEART RATE: 76 BPM | BODY MASS INDEX: 30.76 KG/M2 | WEIGHT: 196 LBS | HEIGHT: 67 IN

## 2021-08-27 DIAGNOSIS — N32.81 OAB (OVERACTIVE BLADDER): Chronic | ICD-10-CM

## 2021-08-27 DIAGNOSIS — M79.2 NEUROPATHIC PAIN: Chronic | ICD-10-CM

## 2021-08-27 DIAGNOSIS — G35 MULTIPLE SCLEROSIS (HCC): Chronic | ICD-10-CM

## 2021-08-27 DIAGNOSIS — G35 MULTIPLE SCLEROSIS (HCC): Primary | Chronic | ICD-10-CM

## 2021-08-27 DIAGNOSIS — F33.41 RECURRENT MAJOR DEPRESSIVE DISORDER, IN PARTIAL REMISSION (HCC): Chronic | ICD-10-CM

## 2021-08-27 PROCEDURE — 99214 OFFICE O/P EST MOD 30 MIN: CPT | Performed by: PSYCHIATRY & NEUROLOGY

## 2021-08-27 RX ORDER — GABAPENTIN 300 MG/1
CAPSULE ORAL
Qty: 720 CAPSULE | Refills: 1 | Status: SHIPPED | OUTPATIENT
Start: 2021-08-27 | End: 2022-03-24 | Stop reason: SDUPTHER

## 2021-08-27 NOTE — PROGRESS NOTES
"Chief Complaint    Multiple Sclerosis    Subjective          Christiane Figueroa presents to [facility] NEUROLOGY     History of Present Illness    61 y.o. female returns in follow up.  Last visit on 2/22/21 continued Aubagio,  mg BID and 1200 mg qhs, Wellbutrin, referred to PT/OT.       MRI Brain 10/31/19 as compared to 7/23/18 - no new/enlarging/enhancing lesions since 6/17/16; right frontal, right centrum semiovale PVWM, scattered T2 lesions       2/22/2021:  CBC, CMP - NCS     RRMS     MSFC worsening SDMT 30 to 25, SLS 4 sec bilaterally     Right sided weakness increased and trouble using right hand.      Tolerating Aubagio.       Previous DMD:  Rebif, Tecfiderrosario. Gilenya(macular edema).     MDD      No new or worsening sx.     Wellbutrin 450 mg daily improved mood.      OAB     Controlled off meds.       Neuropathic pain      Leg pains are controlled on  mg            Objective   Vital Signs:   /72   Pulse 76   Ht 170.2 cm (67.01\")   Wt 88.9 kg (196 lb)   SpO2 97%   BMI 30.69 kg/m²     Physical Exam  Eyes:      Extraocular Movements: EOM normal.      Pupils: Pupils are equal, round, and reactive to light.   Neurological:      Mental Status: She is oriented to person, place, and time.      Gait: Gait is intact.      Deep Tendon Reflexes: Strength normal.   Psychiatric:         Speech: Speech normal.          Neurologic Exam     Mental Status   Oriented to person, place, and time.   Speech: speech is normal   Level of consciousness: alert  Knowledge: good and consistent with education.   Normal comprehension.     Cranial Nerves   Cranial nerves II through XII intact.     CN II   Visual fields full to confrontation.   Visual acuity: normal  Right visual field deficit: none  Left visual field deficit: none     CN III, IV, VI   Pupils are equal, round, and reactive to light.  Extraocular motions are normal.   Nystagmus: none   Diplopia: none  Ophthalmoparesis: none  Upgaze: " normal  Downgaze: normal  Conjugate gaze: present    CN V   Facial sensation intact.   Right corneal reflex: normal  Left corneal reflex: normal    CN VII   Right facial weakness: none  Left facial weakness: none    CN VIII   Hearing: intact    CN IX, X   Palate: symmetric  Right gag reflex: normal  Left gag reflex: normal    CN XI   Right sternocleidomastoid strength: normal  Left sternocleidomastoid strength: normal    CN XII   Tongue: not atrophic  Fasciculations: absent  Tongue deviation: none    Motor Exam   Muscle bulk: normal  Overall muscle tone: normal    Strength   Strength 5/5 throughout.     Sensory Exam   Light touch normal.     Gait, Coordination, and Reflexes     Gait  Gait: normal    Tremor   Resting tremor: absent  Intention tremor: absent  Action tremor: absent    Reflexes   Reflexes 2+ except as noted.      Result Review :   The following data was reviewed by: Yousuf Zelaya MD on 08/27/2021:  Common labs    Common Labsle 2/22/21 2/22/21    0000 0000   Glucose 104 (A)    BUN 16    Creatinine 0.65    eGFR Non  Am 93    eGFR African Am 112    Sodium 138    Potassium 4.4    Chloride 101    Calcium 9.5    Total Protein 6.9    Albumin 4.50    Total Bilirubin 0.3    Alkaline Phosphatase 57    AST (SGOT) 25    ALT (SGPT) 19    WBC  3.90   Hemoglobin  13.8   Hematocrit  42.6   Platelets  181   (A) Abnormal value       Comments are available for some flowsheets but are not being displayed.                     Assessment and Plan    Diagnoses and all orders for this visit:    1. Multiple sclerosis (CMS/HCC) (Primary)  Assessment & Plan:  Worsening scores on MSFC    Refer to PT/OT/SLP    Orders:  -     Ambulatory Referral to Physical Therapy Evaluate and treat  -     Ambulatory Referral to Occupational Therapy  -     Ambulatory Referral to Speech Therapy  -     gabapentin (NEURONTIN) 300 MG capsule; Take two capsules am and pm, and four capsules at bedtime  Dispense: 720 capsule; Refill: 1  -      CBC & Differential; Future  -     Comprehensive Metabolic Panel; Future  -     MRI Brain With & Without Contrast; Future  -     MRI Cervical Spine With & Without Contrast; Future    2. Neuropathic pain  Assessment & Plan:  Sx stable on GBP       3. Recurrent major depressive disorder, in partial remission (CMS/HCC)  Assessment & Plan:  Patient's depression is recurrent and is mild without psychosis. Their depression is currently in partial remission and the condition is unchanged. This will be reassessed at the next regular appointment. F/U as described:patient will continue current medication therapy.      4. OAB (overactive bladder)      Follow Up   No follow-ups on file.  Patient was given instructions and counseling regarding her condition or for health maintenance advice. Please see specific information pulled into the AVS if appropriate.

## 2021-08-28 LAB
ALBUMIN SERPL-MCNC: 4.9 G/DL (ref 3.5–5.2)
ALBUMIN/GLOB SERPL: 2.2 G/DL
ALP SERPL-CCNC: 60 U/L (ref 39–117)
ALT SERPL-CCNC: 19 U/L (ref 1–33)
AST SERPL-CCNC: 20 U/L (ref 1–32)
BASOPHILS # BLD AUTO: 0.04 10*3/MM3 (ref 0–0.2)
BASOPHILS NFR BLD AUTO: 0.8 % (ref 0–1.5)
BILIRUB SERPL-MCNC: 0.2 MG/DL (ref 0–1.2)
BUN SERPL-MCNC: 23 MG/DL (ref 8–23)
BUN/CREAT SERPL: 38.3 (ref 7–25)
CALCIUM SERPL-MCNC: 10 MG/DL (ref 8.6–10.5)
CHLORIDE SERPL-SCNC: 99 MMOL/L (ref 98–107)
CO2 SERPL-SCNC: 32.1 MMOL/L (ref 22–29)
CREAT SERPL-MCNC: 0.6 MG/DL (ref 0.57–1)
EOSINOPHIL # BLD AUTO: 0.15 10*3/MM3 (ref 0–0.4)
EOSINOPHIL NFR BLD AUTO: 3 % (ref 0.3–6.2)
ERYTHROCYTE [DISTWIDTH] IN BLOOD BY AUTOMATED COUNT: 12.3 % (ref 12.3–15.4)
GLOBULIN SER CALC-MCNC: 2.2 GM/DL
GLUCOSE SERPL-MCNC: 98 MG/DL (ref 65–99)
HCT VFR BLD AUTO: 45.1 % (ref 34–46.6)
HGB BLD-MCNC: 14.3 G/DL (ref 12–15.9)
IMM GRANULOCYTES # BLD AUTO: 0.01 10*3/MM3 (ref 0–0.05)
IMM GRANULOCYTES NFR BLD AUTO: 0.2 % (ref 0–0.5)
LYMPHOCYTES # BLD AUTO: 1.04 10*3/MM3 (ref 0.7–3.1)
LYMPHOCYTES NFR BLD AUTO: 20.9 % (ref 19.6–45.3)
MCH RBC QN AUTO: 29.8 PG (ref 26.6–33)
MCHC RBC AUTO-ENTMCNC: 31.7 G/DL (ref 31.5–35.7)
MCV RBC AUTO: 94 FL (ref 79–97)
MONOCYTES # BLD AUTO: 0.46 10*3/MM3 (ref 0.1–0.9)
MONOCYTES NFR BLD AUTO: 9.3 % (ref 5–12)
NEUTROPHILS # BLD AUTO: 3.27 10*3/MM3 (ref 1.7–7)
NEUTROPHILS NFR BLD AUTO: 65.8 % (ref 42.7–76)
NRBC BLD AUTO-RTO: 0 /100 WBC (ref 0–0.2)
PLATELET # BLD AUTO: 182 10*3/MM3 (ref 140–450)
POTASSIUM SERPL-SCNC: 4.7 MMOL/L (ref 3.5–5.2)
PROT SERPL-MCNC: 7.1 G/DL (ref 6–8.5)
RBC # BLD AUTO: 4.8 10*6/MM3 (ref 3.77–5.28)
SODIUM SERPL-SCNC: 142 MMOL/L (ref 136–145)
WBC # BLD AUTO: 4.97 10*3/MM3 (ref 3.4–10.8)

## 2021-09-20 DIAGNOSIS — G35 MULTIPLE SCLEROSIS (HCC): Primary | ICD-10-CM

## 2021-09-20 RX ORDER — ALPRAZOLAM 1 MG/1
TABLET ORAL
Qty: 2 TABLET | Refills: 0 | Status: SHIPPED | OUTPATIENT
Start: 2021-09-20 | End: 2022-03-28

## 2021-09-20 RX ORDER — ERGOCALCIFEROL 1.25 MG/1
50000 CAPSULE ORAL
Qty: 12 CAPSULE | Refills: 3 | Status: SHIPPED | OUTPATIENT
Start: 2021-09-20 | End: 2022-12-06 | Stop reason: SDUPTHER

## 2021-09-20 NOTE — TELEPHONE ENCOUNTER
Last filled: 8/21/2020, 90 day supply with 3 refills  Follow up on 2/28/2022    Sent in Vitamin D and will send rx request for her MRI to [facility]

## 2021-09-20 NOTE — TELEPHONE ENCOUNTER
Caller: Christiane Figueroa    Relationship: Self    Best call back number: [phone]    Medication needed:   Requested Prescriptions     Pending Prescriptions Disp Refills   • vitamin D (ERGOCALCIFEROL) 1.25 MG (53915 UT) capsule capsule 12 capsule 3     Sig: Take 1 capsule by mouth Every 7 (Seven) Days.       When do you need the refill by: TODAY    What additional details did the patient provide when requesting the medication: PATIENT CALLED REQUESTING REFILL FOR VITAMIN D. SHE IS OUT OF THE RX. ALSO REQUESTING AN RX FOR HER MRI ON 9-23-21 AS SHE IS CLAUSTROPHOBIC    Does the patient have less than a 3 day supply:  [x] Yes  [] No    What is the patient's preferred pharmacy: [facility] [address] [address] [address] [facility] [address] - [phone]  - [phone] FX

## 2021-09-23 ENCOUNTER — HOSPITAL ENCOUNTER (OUTPATIENT)
Dept: MRI IMAGING | Facility: HOSPITAL | Age: 61
Discharge: HOME | End: 2021-09-23

## 2021-09-23 DIAGNOSIS — G35 MULTIPLE SCLEROSIS (HCC): Chronic | ICD-10-CM

## 2021-09-23 PROCEDURE — 72156 MRI NECK SPINE W/O & W/DYE: CPT

## 2021-09-23 PROCEDURE — 0 GADOBENATE DIMEGLUMINE 529 MG/ML SOLUTION: Performed by: PSYCHIATRY & NEUROLOGY

## 2021-09-23 PROCEDURE — A9577 INJ MULTIHANCE: HCPCS | Performed by: PSYCHIATRY & NEUROLOGY

## 2021-09-23 PROCEDURE — 70553 MRI BRAIN STEM W/O & W/DYE: CPT

## 2021-09-23 RX ADMIN — GADOBENATE DIMEGLUMINE 18 ML: 529 INJECTION, SOLUTION INTRAVENOUS at 15:47

## 2021-10-04 ENCOUNTER — TREATMENT (OUTPATIENT)
Dept: PHYSICAL THERAPY | Facility: CLINIC | Age: 61
End: 2021-10-04

## 2021-10-04 ENCOUNTER — OFFICE VISIT (OUTPATIENT)
Dept: PHYSICAL THERAPY | Facility: CLINIC | Age: 61
End: 2021-10-04

## 2021-10-04 DIAGNOSIS — R41.841 COGNITIVE COMMUNICATION DEFICIT: ICD-10-CM

## 2021-10-04 DIAGNOSIS — R13.10 DYSPHAGIA, UNSPECIFIED TYPE: Primary | ICD-10-CM

## 2021-10-04 DIAGNOSIS — R26.89 BALANCE PROBLEM: Primary | ICD-10-CM

## 2021-10-04 DIAGNOSIS — Z74.09 IMPAIRED MOBILITY AND ADLS: Primary | ICD-10-CM

## 2021-10-04 DIAGNOSIS — Z78.9 IMPAIRED MOBILITY AND ADLS: Primary | ICD-10-CM

## 2021-10-04 DIAGNOSIS — R27.8 DECREASED COORDINATION: ICD-10-CM

## 2021-10-04 DIAGNOSIS — G35 MULTIPLE SCLEROSIS (HCC): Chronic | ICD-10-CM

## 2021-10-04 PROCEDURE — 97166 OT EVAL MOD COMPLEX 45 MIN: CPT | Performed by: OCCUPATIONAL THERAPIST

## 2021-10-04 PROCEDURE — 97530 THERAPEUTIC ACTIVITIES: CPT | Performed by: OCCUPATIONAL THERAPIST

## 2021-10-04 PROCEDURE — 92610 EVALUATE SWALLOWING FUNCTION: CPT | Performed by: SPEECH-LANGUAGE PATHOLOGIST

## 2021-10-04 PROCEDURE — 96125 COGNITIVE TEST BY HC PRO: CPT | Performed by: SPEECH-LANGUAGE PATHOLOGIST

## 2021-10-04 PROCEDURE — 97110 THERAPEUTIC EXERCISES: CPT | Performed by: PHYSICAL THERAPIST

## 2021-10-04 PROCEDURE — 97161 PT EVAL LOW COMPLEX 20 MIN: CPT | Performed by: PHYSICAL THERAPIST

## 2021-10-04 NOTE — PROGRESS NOTES
Outpatient Speech Language Pathology   Adult Speech Language Cognitive, Swallow Initial Evaluation       Patient Name: Christiane Figueroa  : 1960  MRN: [id number]  Today's Date: 10/4/2021        Visit Date: 10/04/2021   Patient Active Problem List   Diagnosis   • Ankle injury   • Depression   • Multiple sclerosis (HCC)   • OAB (overactive bladder)   • Neuropathic pain        Past Medical History:   Diagnosis Date   • Anxiety    • Arthritis    • Depression    • Diabetes mellitus (HCC)    • Fatigue    • Hypertension    • Limb swelling     BLE, left leg worse   • LOM (loss of memory)    • Multiple sclerosis (HCC)    • Pain, joint, shoulder     Bilateral   • Vision problems         Past Surgical History:   Procedure Laterality Date   • BELPHAROPTOSIS REPAIR     • GALLBLADDER SURGERY           Visit Dx:    ICD-10-CM ICD-9-CM   1. Dysphagia, unspecified type  R13.10 787.20   2. Cognitive communication deficit  R41.841 799.52           OP SLP Assessment/Plan - 10/04/21 1100        SLP Assessment    Functional Problems  Speech Language- Adult/Cognition;Swallowing   -RB    Impact on Function: Adult Speech Language/Cognition  Difficulty communicating wants, needs, and ideas;Difficulty communicating in a medical emergency;Restrictions in personal and social life;Poor attention to task;Trouble learning or remembering new information   -RB    Clinical Impression: Speech Language-Adult/Congnition  Mild-Moderate:;Cognitive Communication Impairment   -RB    Impact on Function: Swallowing  Impact on social aspects of eating   -RB    Clinical Impression: Swallowing  dysphagia unspecified   -RB    Functional Problems Comment  cog/comm deficits impac tdaily tasks, swallowing concerns impact QOL   -RB    Clinical Impression Comments  recommend MBS and cog/comm tx   -RB    Please refer to paper survey for additional self-reported information  Yes   -RB    Please refer to items scanned into chart for additional diagnostic informaiton  "and handouts as provided by clinician  Yes   -RB    SLP Diagnosis  cog/comm dysphagia    -RB    Prognosis  Good (comment)   -RB    Patient/caregiver participated in establishment of treatment plan and goals  Yes   -RB    Patient would benefit from skilled therapy intervention  Yes   -RB       SLP Plan    Frequency  1x/weekly   -RB    Duration  8 weeks   -RB    Planned CPT's?  SLP INDIVIDUAL SPEECH THERAPY: 56259;SLP SWALLOW THERAPY: 40333;SLP MBS: 16802   -RB    Expected Duration of Therapy Session (SLP Eval)  45   -RB    Plan Comments  MBS   -RB      User Key  (r) = Recorded By, (t) = Taken By, (c) = Cosigned By    Initials Name Provider Type    RB Aimee Carmona, SLP Speech and Language Pathologist          SLP SLC Evaluation - 10/04/21 1000        Communication Assessment/Intervention    Document Type  evaluation   -RB    Total Evaluation Minutes, SLP  45   -RB    Subjective Information  no complaints   -RB    Patient Observations  alert;cooperative;agree to therapy   -RB    Care Plan Review  evaluation/treatment results reviewed;care plan/treatment goals reviewed;risks/benefits reviewed;current/potential barriers reviewed;patient/other agree to care plan   -RB    Patient Effort  good   -RB    Symptoms Noted During/After Treatment  none   -RB       General Information    Patient Profile Reviewed  yes   -RB    Pertinent History Of Current Problem  PMH: MS, depression, reflux. PT reports cognitive and swallowing concerns. She reports she sometimes \"chokes\" and gravitates towards soft foods due to diffiuclty. Reports globus sensation. Reports trouble with liquids and solids. No hx of aspiration PNA. PT reports trouble with recall, problem solving, reading, STM, attention, and word finding that have progressed over the past year. She lives with her , is not currently working. Reports trouble with medicines, finances, and appointment management.    -RB    Precautions/Limitations, Vision  WFL with corrective " lenses;for purposes of eval   -RB    Precautions/Limitations, Hearing  WFL;for purposes of eval   -RB    Patient Level of Education  not stated   -RB    Prior Level of Function-Communication  WFL   -RB    Plans/Goals Discussed with  patient;agreed upon   -RB    Barriers to Rehab  none identified   -RB    Patient's Goals for Discharge  functional cognition   -RB    Standardized Assessment Used  RBANS   -RB       Pain    Additional Documentation  Pain Scale: Numbers Pre/Post-Treatment (Group)   -RB       Pain Scale: Numbers Pre/Post-Treatment    Pretreatment Pain Rating  0/10 - no pain   -RB    Posttreatment Pain Rating  0/10 - no pain   -RB       Comprehension Assessment/Intervention    Comprehension Assessment/Intervention  Auditory Comprehension   -RB       Auditory Comprehension Assessment/Intervention    Auditory Comprehension (Communication)  WFL   -RB       Expression Assessment/Intervention    Expression Assessment/Intervention  verbal expression   -RB       Verbal Expression Assessment/Intervention    Verbal Expression  WFL   -RB       Oral Motor Structure and Function    Oral Motor Structure and Function  WFL   -RB    Dentition Assessment  natural, present and adequate   -RB    Mucosal Quality  moist, healthy   -RB       Oral Musculature and Cranial Nerve Assessment    Oral Motor General Assessment  WFL   -RB       Motor Speech Assessment/Intervention    Motor Speech Function  WFL   -RB       Cursory Voice Assessment/Intervention    Quality and Resonance (Voice)  WFL   -RB       Cognitive Assessment Intervention- SLP    Cognitive Function (Cognition)  mild impairment;moderate impairment   -RB    Orientation Status (Cognition)  WFL   -RB    Memory (Cognitive)  moderate impairment;functional;immediate;short-term;visual;auditory;mild impairment;delayed   -RB    Attention (Cognitive)  WFL;sustained;mild impairment;distracting environment;attention to detail   -RB    Thought Organization (Cognitive)  mild  impairment;concrete divergent   -RB    Reasoning (Cognitive)  WFL;simple   -RB    Problem Solving (Cognitive)  mild impairment;moderate impairment;multifactorial   -RB    Executive Function (Cognition)  mild impairment;moderate impairment;self-monitoring/correction;home management activities;complex organization   -RB    Pragmatics (Communication)  WFL   -RB    Cognition, Comment  mild/mod deficits with thought organization, problem solving, STM, recall, and executive functioning. would benefit from skilled ST to address    -RB       Standardized Tests    Cognitive/Memory Tests  RBANS: Repeatable Battery for the Assessment of Neuropsychological Status   -RB       RBANS- Repeatable Battery for the Assessment of Neuropsychological Status    Immediate Memory Index Score  69   -RB    Immediate Memory Qualitative Description  extremely low   -RB    Visuospatial Index Score  78   -RB    Visuospatial Qualitative Description  extremely low   -RB    Language Index Score  85   -RB    Language Qualitative Description  low average;borderline   -RB    Attention Index Score  103   -RB    Attention Qualitative Description  average   -RB    Delayed Memory Index Score  81   -RB    Delayed Memory Qualitative Description  extremely low   -RB    Total Index Score  416   -RB      User Key  (r) = Recorded By, (t) = Taken By, (c) = Cosigned By    Initials Name Provider Type    RB Aimee Carmona SLP Speech and Language Pathologist        SLP Adult Swallow Evaluation     Row Name 10/04/21 1000       General Information    Current Method of Nutrition  soft textures;regular textures;thin liquids prefers soft foods due to concerns   -RB    Prior Level of Function-Communication  WFL  -RB    Prior Level of Function-Swallowing  no diet consistency restrictions  -RB    Patient's Goals for Discharge  eat/drink without coughing/choking  -RB       Oral Motor Structure and Function    Volitional Swallow  WFL  -RB       General Eating/Swallowing  "Observations    Respiratory Support Currently in Use  room air  -RB    Eating/Swallowing Skills  self-fed;appropriate self-feeding skills observed  -RB    Positioning During Eating  upright 90 degree;upright in chair  -RB    Utensils Used  spoon;cup  -RB    Consistencies Trialed  regular textures;thin liquids;pureed;mixed consistency  -RB       Respiratory    Respiratory Status  WFL  -RB       Clinical Swallow Eval    Oral Prep Phase  WFL  -RB    Oral Transit  WFL  -RB    Oral Residue  WFL  -RB    Pharyngeal Phase  suspected pharyngeal impairment  -RB    Esophageal Phase  suspected esophageal impairment  -RB    Clinical Swallow Evaluation Summary  Pt given the EAT-10 and scored a 21/40. Pt scored a 4 for \"swallowing solids takes extra effort\" . Pt scored a 3 for \"my swallowing intereferes with my ability to go out for meals, swallowing liquids takes extra effort, swallowing pills takes extra effort, the pleasure of eating is affected by my swallowing, and when I swallow food sticks in my throat\". See scanned report for full EAT-10. No overt phrayngeal s/sx noted today, however pt reports diffiuclty at home and has hx of MS. Suspect esophageal involvement due to pt concerns. Would recommend MBS due to pt report and hx of MS.   -RB       Pharyngeal Phase Concerns    Pharyngeal Phase Concerns  other (see comments) pt reports pharyngeal s/sx at home (cough)  -RB       Esophageal Phase Concerns    Esophageal Phase Concerns  sensation of material sticking;globus  -RB    Globus  all consistencies  -RB    Sensation of Material Sticking  all consistencies  -RB       Recommendations    Recommended Diagnostics  VFSS (MBS)  -RB      User Key  (r) = Recorded By, (t) = Taken By, (c) = Cosigned By    Initials Name Provider Type    Aimee Piedra SLP Speech and Language Pathologist              LTGs     Pt and family will implement compensatory strategies to maximize patient’s memory function so patient can continue to " participate in daily activities  at 90% with no cues     Pt will be able to remember information needed to participate in avocational activities  at 90% with no cues     STGs  Pt will utilize word finding strategies at 90% with no cues     Pt’s memory skills will be enhanced as reported by patient by utilizing internal memory strategies to recall up to 3 pieces of information after a 5 minute delay     Pt’s memory skills will be enhanced as reported by patient using external memory aides  at 90% with no cues     Pt will demonstrate improved ability to recall information by listening/reading information and answering  Questions  at 90% with no cues    Pt will demonstrate improved ability to problem solve visuospatial tasks 90% no cues   Recertification: 1/3/22          OP SLP Education     Row Name 10/04/21 1100       Education    Barriers to Learning  No barriers identified  -RB    Education Provided  Described results of evaluation;Patient expressed understanding of evaluation  -RB    Assessed  Learning needs;Learning motivation;Learning preferences;Learning readiness  -RB    Learning Motivation  Strong  -RB    Learning Method  Explanation;Demonstration;Teach back;Written materials  -RB    Teaching Response  Verbalized understanding;Demonstrated understanding;Reinforcement needed  -RB    Education Comments  HEP: MS and swallowing, cog/comm strategies   -RB      User Key  (r) = Recorded By, (t) = Taken By, (c) = Cosigned By    Initials Name Effective Dates    Aimee Piedra SLP 06/16/21 -              SLP Outcome Measures (last 72 hours)      SLP Outcome Measures     Row Name 10/04/21 1100             SLP Outcome Measures    Outcome Measure Used?  Adult NOMS  -RB         Adult FCM Scores    FCM Chosen  Memory  -RB      Memory FCM Score  5  -RB        User Key  (r) = Recorded By, (t) = Taken By, (c) = Cosigned By    Initials Name Effective Dates    Aimee Piedra SLP 06/16/21 -             Aimee Carmona MA  CCC-SLP, IS  10/4/2021

## 2021-10-04 NOTE — PROGRESS NOTES
Occupational Therapy Initial Evaluation and Plan of Care      Patient: Christiane Figueroa   : 1960  Diagnosis/ICD-10 Code:  Impaired mobility and ADLs [Z74.09, Z78.9]  Referring practitioner: Yousuf Zelaya MD  Date of Initial Visit: Type: THERAPY  Noted: 10/4/2021  Today's Date: 10/4/2021  Patient seen for 1 sessions      Subjective:     Subjective Questionnaire: 9HPT R: 25.22      L: 27.12  Mild ataxia LUE with decreased targeting. Mildly impaired in-hand manipulation and translation bilaterally.    Subjective Evaluation    History of Present Illness  Mechanism of injury: Pt diagnosed with MS in  and reports slowly getting worse. Pt went on disability in  and reports feeling better since she stopped working.  Pt reports having 3-4 falls this past year.  Pt reports that her walking is slower, difficulty using fingers (used to sow), and cognitive decline.  Pt reports that her  is with her all the time.    Pt reports significant difficulty with jewelry clasps, anything requiring high-level dexterity. She sttates she can no longer sew and is slowing down with digit movement and now required to use voice to text on her cell phone d/t so many errors when typing. Pt has difficulty with HW, worse at sometimes vs others. Pt notes increased time and effort with laces and avoids those types of shoes or has  assist. Pt avoids button front shirts d/t severity of difficulty with performing.   Pt purchased veggie chopper to avoid attempts at self cutting d/t inability to perform as she used to be able to do. Pt reports difficulty cutting up meats occasionally. Has significant trouble with opening jars, containers, bottles.   Pt experiences tingling B and tingling in R ulnar portion of hand intermittently. Has pain in R arm. Pt cannot hold her arms above head for any length of time d/t fatigue. Pt not uses a hot round brush/hair dryer combo.   Pt has tub shower combo. Pt does not shower if home  alone.       Patient Occupation: disabilty; used to work as cook in school lunch room Quality of life: good    Pain  Current pain ratin  At best pain ratin  At worst pain ratin  Location: B LEs  Quality: sharp  Exacerbated by: resting; fatigue.    Social Support  Lives in: one-story house (0ste)  Lives with: spouse    Hand dominance: right    Patient Goals  Patient goals for therapy: improved balance and independence with ADLs/IADLs  Patient goal: stay mobile; improve hands and memory         Objective          Active Range of Motion   Left Shoulder   Flexion: WFL    Right Shoulder   Flexion: WFL    Left Elbow   Flexion: WFL  Extension: WFL  Forearm supination: WFL  Forearm pronation: WFL    Right Elbow   Flexion: WFL  Extension: WFL  Forearm supination: WFL  Forearm pronation: WFL    Left Wrist   Wrist flexion: WFL  Wrist extension: WFL    Right Wrist   Wrist flexion: WFL  Wrist extension: WFl    Additional Active Range of Motion Details  Opposition, eyes closed less accurate L vs R  Finger to nose, eyes closed L less accurate than R        OT Neuro         OT Exercises     Row Name 10/04/21 0930             Exercise 1    Exercise Name 1  OT IE completed per consult  -ST         Exercise 2    Exercise Name 2  GMC exercises w/focus on finger to nose ALT R/L   -ST         Exercise 3    Exercise Name 3  GMC exercises focus on circular motions of B forearms   -ST        User Key  (r) = Recorded By, (t) = Taken By, (c) = Cosigned By    Initials Name Provider Type    Ni Petersen OTR Occupational Therapist           Assessment & Plan     Assessment  Impairments: abnormal coordination, abnormal gait, activity intolerance, impaired balance, impaired physical strength, lacks appropriate home exercise program and safety issue  Assessment details: Pt presents with deficits related to her MS including fatigue, reduced endurance, balance FM/GM coordination and sensory changes. Pt reports having increased  difficulty with high-level dexterity tasks including buttoning, zipping, HW, cutting food, etc. Recommend skilled OT services to address above areas and promote increased safety, independence and overall engagement in daily tasks.   Prognosis: good  Functional Limitations: walking, moving in bed and standing  Goals  Plan Goals:   Pt will improve score by 4 seconds or more on the 9HPT to demonstrate increased accuracy and speed with fine motor coordination by 8 wks.      Pt will be independent with hand strengthening HEP to increase independence with ADL/IADL performance tasks by 4 wks.    Pt will be independent with hand FMC HEP to increase independence with ADL/IADL performance tasks by 4 wks.     Pt will be independent with B UE strengthening HEP to increase performance in ADL/IADL tasks by 8 wks.   .           Plan  Planned therapy interventions: ADL retraining, balance/weight-bearing training, fine motor coordination training, flexibility, functional ROM exercises, home exercise program, motor coordination training, neuromuscular re-education, postural training, strengthening, stretching, therapeutic activities, transfer training and IADL retraining  Frequency: 1x week  Duration in visits: 10  Treatment plan discussed with: patient and family  Plan details: Est OT POC and goals to reflect pt needs and promote increased QOL and satisfaction with daily activities.         Timed:  Manual Therapy:    0     mins  44840;  Therapeutic Exercise:    0     mins  15852;     Neuromuscular Yefri:    0    mins  14803;    Therapeutic Activity:     10     mins  51520;     Self-Care/ADL     0     mins  83445;   Sensory Int. Tech      0     mins 13710;  Ultrasound:     0     mins  92737;    Electrical Stimulation:    0     mins  59657 ( );    Untimed:  Electrical Stimulation:    0     mins  11586 ( );    Timed Treatment:   10   mins   Total Treatment:     45   mins    OT Signature: Ni Ruiz MS, OTR/L, CDP  [place]  License #: [id number]  DATE TREATMENT INITIATED: 10/4/2021    Initial Certification Certification Period: 1/2/2022  I certify that the therapy services are furnished while this patient is under my care. The services outlined above are required by this patient and will be reviewed every 90 days.     Physician Signature: __________________________________  Yousuf Zelaya MD    Please sign and return via fax to [phone]  Thank you,   [facility] Occupational Therapy

## 2021-10-04 NOTE — PROGRESS NOTES
Physical Therapy Initial Evaluation and Plan of Care      Patient: Christiane Figueroa   : 1960  Diagnosis/ICD-10 Code:  No primary diagnosis found.  Referring practitioner: Yousuf Zelaya MD  Date of Initial Visit: 10/4/2021  Today's Date: 10/4/2021  Patient seen for 1 sessions           Subjective Questionnaire: n/a      Subjective Evaluation    History of Present Illness  Mechanism of injury: Pt diagnosed with MS in  and reports slowly gotten worse with things not working like they used to. Pt had to go on disability in  and reports feeling better since she stopped working.  Pt reports having 3-4 falls this past year.  Pt reports that her walking is slower, difficulty using fingers (used to sow), and cognitive decline.  Pt reports that her  is with her all the time.  Pt is driving three hours from home and staying with her daughter on the days she has therapy.       Patient Occupation: disabilty; used to work as cook in school lunch room Quality of life: good    Pain  Current pain ratin  At best pain ratin  At worst pain ratin  Location: B LEs  Quality: sharp  Exacerbated by: resting; fatigue.    Social Support  Lives in: one-story house ([facility])  Lives with: spouse    Hand dominance: right    Treatments  No previous or current treatments  Patient Goals  Patient goals for therapy: improved balance  Patient goal: stay mobile; improve hands and memory           Objective          Neurological Testing     Sensation     Ankle/Foot   Left Ankle/Foot   Intact: light touch and proprioception    Right Ankle/Foot   Intact: proprioception   Diminished: light touch    Comments   Right light touch: end of toes.     Strength/Myotome Testing     Left Hip   Planes of Motion   Flexion: 4  Extension: 4-  Abduction: 4-  Adduction: 4-    Right Hip   Planes of Motion   Flexion: 4  Extension: 4-  Abduction: 4-  Adduction: 4-    Left Knee   Flexion: 4-  Extension: 4-    Right Knee   Flexion:  4-  Extension: 4-    Left Ankle/Foot   Dorsiflexion: 4-  Plantar flexion: 4-    Right Ankle/Foot   Dorsiflexion: 4-  Plantar flexion: 4-    Ambulation     Ambulation: Stairs   Ascend stairs: independent  Pattern: non-reciprocal  Railings: one rail  Descend stairs: independent  Pattern: non-reciprocal  Railings: one rail    Additional Stairs Ambulation Details  Pt turns towards the HR with descending steps nonreciprocal pattern    Observational Gait   Decreased walking speed.   Base of support: normal    Additional Observational Gait Details  Occasional veering; decreased ortiz; pt reports occasional catching of R foot with ambulation     General Comments     Hip Comments   B hamstring tightness with PROM       PT Neuro         Assessment & Plan     Assessment  Impairments: abnormal coordination, abnormal gait, activity intolerance, impaired balance, impaired physical strength and lacks appropriate home exercise program  Other impairment: balance  Assessment details: Pt presents with stable impairments following MS diagnosis.  Pt to benefit from skilled PT services to meet goals and improve overall functional mobility.  Pt lives three hours away but stays with dtr here in town when coming to therapy.  Pt can only be seen as she's able to stay with dtr.  Prognosis: good  Functional Limitations: carrying objects, lifting, walking, reaching behind back and reaching overhead  Goals  Plan Goals: STG (4 visits)  1. Patient to improve REID balance score to >/= 43 /56 to decrease client's risk of falls.  2. Patient to perform TUG within 11 sec without LOB for improved functional mobility.  3. Patient to ambulate 10 meters without AD within 10 sec without LOB for improved gait ortiz and functional mobility.  4. Patient to improve FGA score to >/= 21 /30 to decrease client's risk of falls.    LTG (8 visits)  1. Patient to improve REID balance score to >/= 49 /56 to decrease client's risk of falls.  2. Patient to perform  "TUG within 10 sec without LOB for improved functional mobility.  3. Patient to ambulate 10 meters without AD within 9.5 sec without LOB for improved gait ortiz and functional mobility.  4. Patient to improve FGA score to >/= 26 /30 to decrease client's risk of falls.  5. Pt to ascend/decend steps without HR\"s using recip pattern without LOB for improved overall functional mobility.  6. Patient to be I with HEP.    Plan  Therapy options: will be seen for skilled physical therapy services  Planned modality interventions: electrical stimulation/Russian stimulation  Planned therapy interventions: fine motor coordination training, gait training, functional ROM exercises, home exercise program, neuromuscular re-education, strengthening, stretching, therapeutic activities and transfer training  Frequency: 1x week  Duration in visits: 8  Treatment plan discussed with: patient  Plan details: Patient will be seen 1x/wk x 8 visits with treatment to include strengthening, stretching, neuromuscular re-education, balance, gait and endurance training.         Timed:  Manual Therapy:    0     mins  09624;  Therapeutic Exercise:    10     mins  99075;     Neuromuscular Yefri:    0    mins  77897;    Therapeutic Activity:     0     mins  81378;     Gait Trainin     mins  96998;     Ultrasound:     0     mins  34084;    Electrical Stimulation:    0     mins  64844 ( );    Untimed:  Electrical Stimulation:    0     mins  74257 ( );  Mechanical Traction:    0     mins  71161;     Timed Treatment:   10   mins   Total Treatment:     45   mins    PT SIGNATURE: Colleen Valle, PT   DATE TREATMENT INITIATED: 10/4/2021    Initial Certification  Certification Period: 2022  I certify that the therapy services are furnished while this patient is under my care.  The services outlined above are required by this patient, and will be reviewed every 90 days.     PHYSICIAN: Yousuf Zelaya MD      DATE:     Please sign " and return via fax to [phone].. Thank you, [facility].

## 2021-10-12 ENCOUNTER — TREATMENT (OUTPATIENT)
Dept: PHYSICAL THERAPY | Facility: CLINIC | Age: 61
End: 2021-10-12

## 2021-10-12 DIAGNOSIS — R26.89 BALANCE PROBLEM: Primary | ICD-10-CM

## 2021-10-12 DIAGNOSIS — Z74.09 IMPAIRED MOBILITY AND ADLS: Primary | ICD-10-CM

## 2021-10-12 DIAGNOSIS — R41.841 COGNITIVE COMMUNICATION DEFICIT: Primary | ICD-10-CM

## 2021-10-12 DIAGNOSIS — Z78.9 IMPAIRED MOBILITY AND ADLS: Primary | ICD-10-CM

## 2021-10-12 DIAGNOSIS — R27.8 DECREASED COORDINATION: ICD-10-CM

## 2021-10-12 PROCEDURE — 97112 NEUROMUSCULAR REEDUCATION: CPT | Performed by: PHYSICAL THERAPIST

## 2021-10-12 PROCEDURE — 92507 TX SP LANG VOICE COMM INDIV: CPT | Performed by: SPEECH-LANGUAGE PATHOLOGIST

## 2021-10-12 PROCEDURE — 97112 NEUROMUSCULAR REEDUCATION: CPT | Performed by: OCCUPATIONAL THERAPIST

## 2021-10-12 PROCEDURE — 97110 THERAPEUTIC EXERCISES: CPT | Performed by: PHYSICAL THERAPIST

## 2021-10-12 PROCEDURE — 97530 THERAPEUTIC ACTIVITIES: CPT | Performed by: OCCUPATIONAL THERAPIST

## 2021-10-12 NOTE — PROGRESS NOTES
"Physical Therapy Daily Progress Note  Visit: 2  Date of Initial Visit: Type: THERAPY  Noted: 10/4/2021    Christiane Figueroa reports: \"The exercises you gave me are hard.\"    Subjective Evaluation    Pain  Current pain rating: 3  Location: B LEs           Objective   See Exercise, Manual, and Modality Logs for complete treatment.    Exercise 1  Exercise Name 1: NuStep B UE/LEs  Equipment/Resistance 1: level 6  Time: 8 min  Exercise 2  Exercise Name 2: Issued and performed B sidestepping and zig-zag stepping with band  Equipment/Resistance 2: green tband; VCing to keep feet pointed straight ahead  Sets/Reps 2: 3 min  Exercise 3  Exercise Name 3: St balance with fwd, B rotational lunge to BOSU without UE A; repeat with fwd and rotational lunge to BOSU with toss/catch ball on/off rebounder  Equipment/Resistance 3: BOSU; min A; LOB 30% of the time  Sets/Reps 3: 10  Exercise 4  Exercise Name 4: ST balance on rocker board fwd/bwd with B cervical rot/flex/ext  Equipment/Resistance 4: rocker board; min/mod A; LOB 40% of the time  Sets/Reps 4: 10       PT Neuro          Assessment & Plan     Assessment  Assessment details: Pt requires min/mod A with standing dynamic balance on uneven surfaces with LOB up to 40% of the time.  Pt requires several seated rest breaks secondary to knee fatigue.  Pt to benefit from skilled PT services to improve overall functional mobility.    Plan  Plan details: Pt to benefit from skilled PT services to improve gait, balance, strength, and overall functional mobility.          Manual Therapy:     0     mins  24778;  Therapeutic Exercise:     15     mins  60802;     Neuromuscular Yefri:     30    mins  12102;    Therapeutic Activity:      0     mins  94596;     Gait Trainin     mins  77984;     Ultrasound:      0     mins  09261;    Electrical Stimulation:     0     mins  21578 ( );  Dry Needling      0     mins self-pay  Traction      0     mins 47928  Canalith Repositioning    0     " mins 74337      Timed Treatment:   45   mins   Total Treatment:     45   mins    Colleen Valle, PT  [place] License #: [id number]    Physical Therapist

## 2021-10-12 NOTE — PROGRESS NOTES
Outpatient Speech Language Pathology   Adult Speech Language Cognitive Treatment Note       Patient Name: Christiane Figueroa  : 1960  MRN: [id number]  Today's Date: 10/12/2021         Visit Date: 10/12/2021   Patient Active Problem List   Diagnosis   • Ankle injury   • Depression   • Multiple sclerosis (HCC)   • OAB (overactive bladder)   • Neuropathic pain          Visit Dx:    ICD-10-CM ICD-9-CM   1. Cognitive communication deficit  R41.841 799.52   Pain: 0  Subjective: PT arrived 15 minutes late due to traffic   LTGs     Pt and family will implement compensatory strategies to maximize patient’s memory function so patient can continue to participate in daily activities  at 90% with no cues   -modeled/targeted IM/compensatory strategies: 85%  Pt will be able to remember information needed to participate in avocational activities  at 90% with no cues   -medicine management: 100, calendar management: 95%, health literacy: 100%, budgetin%  STGs  Pt will utilize word finding strategies at 90% with no cues   -not targeted today  Pt’s memory skills will be enhanced as reported by patient by utilizing internal memory strategies to recall up to 3 pieces of information after a 5 minute delay   -modeled/provided association handout  Pt’s memory skills will be enhanced as reported by patient using external memory aides  at 90% with no cues   -calendar task: 95%  Pt will demonstrate improved ability to recall information by listening/reading information and answering  Questions  at 90% with no cues   -health insurance card and prescription label: 100%  Pt will demonstrate improved ability to problem solve visuospatial tasks 90% no cues   -not targeted today  Recertification: 1/3/22        OP SLP Assessment/Plan - 10/12/21 1300        SLP Assessment    Functional Problems Speech Language- Adult/Cognition  -RB    Impact on Function: Adult Speech Language/Cognition Difficulty communicating wants, needs, and ideas;  Difficulty communicating in a medical emergency; Restrictions in personal and social life; Poor attention to task; Trouble learning or remembering new information  -RB    Clinical Impression: Speech Language-Adult/Congnition Mild-Moderate:; Cognitive Communication Impairment  -RB    Functional Problems Comment cog/comm deficits impact daily tasks  -RB    Clinical Impression Comments pt wants to put MBS on hold due to insurance, receptive to tx and strategies presented  -RB    Please refer to paper survey for additional self-reported information Yes  -RB    Please refer to items scanned into chart for additional diagnostic informaiton and handouts as provided by clinician Yes  -RB    SLP Diagnosis cog/comm  -RB    Prognosis Good (comment)  -RB    Patient/caregiver participated in establishment of treatment plan and goals Yes  -RB    Patient would benefit from skilled therapy intervention Yes  -RB       SLP Plan    Frequency 1x/weekly  -RB    Duration 7 weeks  -RB    Planned CPT's? SLP INDIVIDUAL SPEECH THERAPY: 74226  -RB    Expected Duration of Therapy Session (SLP Eval) 45  -RB    Plan Comments continue POC  -RB          User Key  (r) = Recorded By, (t) = Taken By, (c) = Cosigned By    Initials Name Provider Type    Aimee Piedra SLP Speech and Language Pathologist                 OP SLP Education     Row Name 10/12/21 1400       Education    Barriers to Learning No barriers identified  -RB    Education Provided Patient demonstrated recommended strategies; Patient requires further education on strategies, risks  -RB    Assessed Learning needs; Learning motivation; Learning preferences; Learning readiness  -RB    Learning Motivation Strong  -RB    Learning Method Explanation; Demonstration; Teach back; Written materials  -RB    Teaching Response Verbalized understanding; Demonstrated understanding; Reinforcement needed  -RB    Education Comments HEP: association handout  -RB          User Key  (r) = Recorded  By, (t) = Taken By, (c) = Cosigned By    Initials Name Effective Dates    Aimee Piedra SLP 06/16/21 -                     Aimee Carmona MA CCC-LORRIE, WES  10/12/2021

## 2021-10-12 NOTE — PROGRESS NOTES
"Occupational Therapy Daily Progress Note  Visit: 2  Date of Initial Visit: Type: THERAPY  Noted: 10/4/2021      Patient: Christiane Figueroa   : 1960  Diagnosis/ICD-10 Code:  Impaired mobility and ADLs [Z74.09, Z78.9]  Referring practitioner: Yousuf Zelaya MD  Date of Initial Visit: Type: THERAPY  Noted: 10/4/2021  Today's Date: 10/13/2021  Patient seen for 2 sessions      Subjective:   Patient reports: \"I feel like the exercises are helping. I can tell it's better with the left but still has a ways to go\"  Pain: 0/10  Subjective Questionnaire: n/a  Clinical Progress: improved  Home Program Compliance: Yes  Treatment has included: neuromuscular re-education and therapeutic activity    Subjective   Objective     OT Neuro          OT Exercises     Row Name 10/12/21 1500             Exercise 1    Exercise Name 1 neural priming BUE's; L 2.0  -ST      Equipment 1 UE Ergometer  -ST      Time (Minutes) 1 2  -ST              Exercise 2    Exercise Name 2 GMC, finger to nose, increasing speed with eyes open, performed with eyes closed for progression  -ST              Exercise 3    Exercise Name 3 GMC with reaching and placing objects distally ALT R/L then only L hand, placing washers, pins, collars into pegboard, more difficulty with L accuracy  -ST              Exercise 4    Exercise Name 4 L handed GMC, increasing accuracy using notched pegs to place in notched pegboard at distance  -ST            User Key  (r) = Recorded By, (t) = Taken By, (c) = Cosigned By    Initials Name Provider Type    Ni Petersen, OTR Occupational Therapist                 Assessment & Plan     Assessment  Assessment details: Pt with improved GMC during finger to nose, rapid/alt and opposition with eyes open and closed.    Plan  Plan details: Continue w/POC and goals as est.         Visit Diagnoses:    ICD-10-CM ICD-9-CM   1. Impaired mobility and ADLs  Z74.09 V49.89    Z78.9    2. Decreased coordination  R27.8 781.3        "      Timed:  Manual Therapy:    0     mins  89692;  Therapeutic Exercise:    0     mins  71816;     Neuromuscular Yefri:    10    mins  22931;    Therapeutic Activity:     30     mins  88893;     Self-Care/ADL     0     mins  51174;   Sensory Int. Tech      0     mins 74922;  Ultrasound:     0     mins  71849;    Electrical Stimulation:    0     mins  80402 ( );    Untimed:  Electrical Stimulation:    0     mins  44691 ( );    Timed Treatment:   40   mins   Total Treatment:     40   mins    OT Signature: Ni Ruiz MS, OTR/L, CDP  [place] License #: [id number]

## 2021-10-19 ENCOUNTER — TREATMENT (OUTPATIENT)
Dept: PHYSICAL THERAPY | Facility: CLINIC | Age: 61
End: 2021-10-19

## 2021-10-19 DIAGNOSIS — R27.8 DECREASED COORDINATION: ICD-10-CM

## 2021-10-19 DIAGNOSIS — R26.89 BALANCE PROBLEM: Primary | ICD-10-CM

## 2021-10-19 DIAGNOSIS — Z74.09 IMPAIRED MOBILITY AND ADLS: Primary | ICD-10-CM

## 2021-10-19 DIAGNOSIS — Z78.9 IMPAIRED MOBILITY AND ADLS: Primary | ICD-10-CM

## 2021-10-19 PROCEDURE — 97530 THERAPEUTIC ACTIVITIES: CPT | Performed by: OCCUPATIONAL THERAPIST

## 2021-10-19 PROCEDURE — 97112 NEUROMUSCULAR REEDUCATION: CPT | Performed by: PHYSICAL THERAPIST

## 2021-10-19 PROCEDURE — 97110 THERAPEUTIC EXERCISES: CPT | Performed by: OCCUPATIONAL THERAPIST

## 2021-10-19 PROCEDURE — 97110 THERAPEUTIC EXERCISES: CPT | Performed by: PHYSICAL THERAPIST

## 2021-10-19 NOTE — PROGRESS NOTES
"Occupational Therapy Daily Progress Note  Visit: 3  Date of Initial Visit: Type: THERAPY  Noted: 10/4/2021      Patient: Christiane Figueroa   : 1960  Diagnosis/ICD-10 Code:  Impaired mobility and ADLs [Z74.09, Z78.9]  Referring practitioner: Yousuf Zelaya MD  Date of Initial Visit: Type: THERAPY  Noted: 10/4/2021  Today's Date: 10/19/2021  Patient seen for 3 sessions      Subjective:   Patient reports: \"I have a hard time with sewing because my fingers get tired\"   Pain: 0/10  Subjective Questionnaire: n/a  Clinical Progress: improved  Home Program Compliance: Yes  Treatment has included: therapeutic exercise and therapeutic activity    Subjective   Objective     OT Neuro          OT Exercises     Row Name 10/19/21 1505             Exercise 1    Exercise Name 1 jewlery clasp openning/closing  -ST              Exercise 2    Exercise Name 2 jewelry clasp donning/doffing  -ST              Exercise 3    Exercise Name 3 medium soft putty pincer grasp removal of items R then L hands  -ST              Exercise 4    Exercise Name 4 medium soft putty pincer grasp  -ST              Exercise 5    Exercise Name 5 buttoning/unbuttoning shirt  -ST              Exercise 6    Exercise Name 6 buttoning/unbuttoning shirt, eyes closed  -ST            User Key  (r) = Recorded By, (t) = Taken By, (c) = Cosigned By    Initials Name Provider Type    Ni Petersen OTR Occupational Therapist                 Assessment & Plan     Assessment  Assessment details: Pt demonstrates increased difficulty with effort needed to complete jewelry clasps and see buttoning but able to perform with slight modifications in positioning. Pt issued medium-soft putty for HEP for strengthening to aid with increasing strength for sewing and crafts as pt c/o fatigue when performing these tasks.     Plan  Plan details: Continue POC and goals as est        Visit Diagnoses:    ICD-10-CM ICD-9-CM   1. Impaired mobility and ADLs  Z74.09 V49.89    " Z78.9    2. Decreased coordination  R27.8 781.3             Timed:  Manual Therapy:    0     mins  79962;  Therapeutic Exercise:    12     mins  47697;     Neuromuscular Yefri:    0    mins  69729;    Therapeutic Activity:     28     mins  20879;     Self-Care/ADL     0     mins  68777;   Sensory Int. Tech      0     mins 00538;  Ultrasound:     0     mins  48142;    Electrical Stimulation:    0     mins  08877 ( );    Untimed:  Electrical Stimulation:    0     mins  44151 ( );    Timed Treatment:   40   mins   Total Treatment:     40   mins    OT Signature: Ni Ruiz MS, OTR/L, CDP  [place] License #: [id number]

## 2021-10-19 NOTE — PROGRESS NOTES
"Physical Therapy Daily Treatment Note  Visit: 3  Date of Initial Visit: Type: THERAPY  Noted: 10/4/2021    Christiane Figueroa reports: \"I was tired after I was here last time.  I didn't know I would be that tired.\"    Subjective Evaluation    Pain  Current pain ratin  Location: B LEs         Objective   See Exercise, Manual, and Modality Logs for complete treatment.    Exercise 1  Exercise Name 1: NuStep B UE/LEs  Equipment/Resistance 1: level 6  Time: 8 min  Exercise 2  Exercise Name 2: Fwd and B sidestepping along ladder with one foot in each square and then every other squar; hold staggered along ladder with toss/catch ball; straddle stepping along ladder leading with R and then L LE  Equipment/Resistance 2: agility ladder; min/mod A  Sets/Reps 2: 10  Exercise 3  Exercise Name 3: Ambulation with bounce/catch and then toss/catch  Equipment/Resistance 3: ball; CGA  Sets/Reps 3: 300'       PT Neuro          Assessment & Plan     Assessment  Assessment details: Pt requires min/mod A with standing balance activities and agility ladder.  Pt demonstrates increased R LE toeing out to increase instability.  Pt to benefit from skilled PT services to improve overall functional mobility and balance.    Plan  Plan details: Pt to benefit from skilled PT services to improve gait, balance, strength, and overall functional mobility.          Manual Therapy:     0     mins  49878;  Therapeutic Exercise:     15     mins  25483;     Neuromuscular Yefri:     30    mins  39365;    Therapeutic Activity:      0     mins  84661;     Gait Trainin     mins  12061;     Ultrasound:      0     mins  84624;    Electrical Stimulation:     0     mins  57181 ( );  Dry Needling      0     mins self-pay  Traction      0     mins 25531  Canalith Repositioning    0     mins 13658      Timed Treatment:   45   mins   Total Treatment:     45   mins    Colleen Valle, PT  [place] License #: [id number]    Physical Therapist    "

## 2021-10-26 ENCOUNTER — TREATMENT (OUTPATIENT)
Dept: PHYSICAL THERAPY | Facility: CLINIC | Age: 61
End: 2021-10-26

## 2021-10-26 DIAGNOSIS — R27.8 DECREASED COORDINATION: ICD-10-CM

## 2021-10-26 DIAGNOSIS — Z74.09 IMPAIRED MOBILITY AND ADLS: Primary | ICD-10-CM

## 2021-10-26 DIAGNOSIS — R41.841 COGNITIVE COMMUNICATION DEFICIT: Primary | ICD-10-CM

## 2021-10-26 DIAGNOSIS — Z78.9 IMPAIRED MOBILITY AND ADLS: Primary | ICD-10-CM

## 2021-10-26 PROCEDURE — 97530 THERAPEUTIC ACTIVITIES: CPT | Performed by: OCCUPATIONAL THERAPIST

## 2021-10-26 PROCEDURE — 97110 THERAPEUTIC EXERCISES: CPT | Performed by: OCCUPATIONAL THERAPIST

## 2021-10-26 PROCEDURE — 92507 TX SP LANG VOICE COMM INDIV: CPT | Performed by: SPEECH-LANGUAGE PATHOLOGIST

## 2021-10-26 NOTE — PROGRESS NOTES
Outpatient Speech Language Pathology   Adult Speech Language Cognitive Progress Note       Patient Name: Christiane Figueroa  : 1960  MRN: [id number]  Today's Date: 10/26/2021         Visit Date: 10/26/2021   Patient Active Problem List   Diagnosis   • Ankle injury   • Depression   • Multiple sclerosis (HCC)   • OAB (overactive bladder)   • Neuropathic pain          Visit Dx:    ICD-10-CM ICD-9-CM   1. Cognitive communication deficit  R41.841 799.52     Pain: 0  Subjective: Pt reports no new changes   LTGs     Pt and family will implement compensatory strategies to maximize patient’s memory function so patient can continue to participate in daily activities  at 90% with no cues   -modeled/targeted IM/compensatory strategies: 85%  Pt will be able to remember information needed to participate in avocational activities  at 90% with no cues  -targeted home tasks: 85%  STGs  Pt will utilize word finding strategies at 90% with no cues   -SFA visual aid/ model: 85%  Pt’s memory skills will be enhanced as reported by patient by utilizing internal memory strategies to recall up to 3 pieces of information after a 5 minute delay   -name recall: 4/4 after 5 min delay  -grocery list recall: 5/5 after 5 min delay  Pt’s memory skills will be enhanced as reported by patient using external memory aides  at 90% with no cues   -note takin%  Pt will demonstrate improved ability to recall information by listening/reading information and answering  Questions  at 90% with no cues   -article recall: 90% x2  -6 min podcast: 85%  Recipe recall/problem solvin%  Pt will demonstrate improved ability to problem solve visuospatial tasks 90% no cues   -not targeted today  certification: 10/4/21-1/3/22   OP SLP Assessment/Plan - 10/26/21 1500        SLP Assessment    Functional Problems Speech Language- Peds  -RB    Impact on Function: Adult Speech Language/Cognition Difficulty communicating wants, needs, and ideas; Difficulty  communicating in a medical emergency; Restrictions in personal and social life; Poor attention to task; Trouble learning or remembering new information  -RB    Clinical Impression: Speech Language-Adult/Congnition Mild-Moderate:; Cognitive Communication Impairment  -RB    Functional Problems Comment cog/comm deficits impact daily tasks  -RB    Clinical Impression Comments following HEP nad using strategies at home, receptive to tx  -RB    Please refer to paper survey for additional self-reported information Yes  -RB    Please refer to items scanned into chart for additional diagnostic informaiton and handouts as provided by clinician Yes  -RB    SLP Diagnosis cog/comm  -RB    Prognosis Good (comment)  -RB    Patient/caregiver participated in establishment of treatment plan and goals Yes  -RB    Patient would benefit from skilled therapy intervention Yes  -RB       SLP Plan    Frequency 1x/weekly  -RB    Duration 6 weeks  -RB    Planned CPT's? SLP INDIVIDUAL SPEECH THERAPY: 79296  -RB    Expected Duration of Therapy Session (SLP Eval) 45  -RB    Plan Comments continue POC  -RB          User Key  (r) = Recorded By, (t) = Taken By, (c) = Cosigned By    Initials Name Provider Type    Aimee Piedra SLP Speech and Language Pathologist                   OP SLP Education     Row Name 10/26/21 1500       Education    Barriers to Learning No barriers identified  -RB    Education Provided Patient demonstrated recommended strategies; Patient requires further education on strategies, risks  -RB    Assessed Learning needs; Learning motivation; Learning preferences; Learning readiness  -RB    Learning Motivation Strong  -RB    Learning Method Explanation; Demonstration; Teach back; Written materials  -RB    Teaching Response Verbalized understanding; Demonstrated understanding; Reinforcement needed  -RB    Education Comments HEP: grouping handout  -RB          User Key  (r) = Recorded By, (t) = Taken By, (c) = Cosigned By     Initials Name Effective Dates    Aimee Piedra SLP 06/16/21 -                 Aimee Carmona MA CCC-LORRIE, [facility]IS  10/26/2021

## 2021-10-27 NOTE — PROGRESS NOTES
"Occupational Therapy Daily Progress Note  Visit: 4  Date of Initial Visit: Type: THERAPY  Noted: 10/4/2021      Patient: Christiane Figueroa   : 1960  Diagnosis/ICD-10 Code:  Impaired mobility and ADLs [Z74.09, Z78.9]  Referring practitioner: Yousuf Zelaya MD  Date of Initial Visit: Type: THERAPY  Noted: 10/4/2021  Today's Date: 10/27/2021  Patient seen for 4 sessions      Subjective:   Patient reports: \"I've been feeling very down with the cooler weather and change in day light. I can tell also the increased pain in my jts\"  Pain: 10 generalized pre and post   Subjective Questionnaire: n/a  Clinical Progress: improved  Home Program Compliance: Yes  Treatment has included: therapeutic exercise and therapeutic activity    Subjective   Objective     OT Neuro       SEE FLOW SHEET AND ACTIVITY LOG FOR DETAILS       Assessment & Plan     Assessment  Assessment details: Pt tolerated increased difficulty level of tasks for digit strengthening this date and reports that she didn't even realize how much she had lost in her hands until starting therapy. She has been compliant with exercises. She continues to fatigue quickly with any sustained/repetitive tasks and therefore impacts her cooking/cleaning and leisure tasks of sewing and crafting.    Plan  Plan details: Continue w/POC and goals as est.         Visit Diagnoses:    ICD-10-CM ICD-9-CM   1. Impaired mobility and ADLs  Z74.09 V49.89    Z78.9    2. Decreased coordination  R27.8 781.3             Timed:  Manual Therapy:    0     mins  87728;  Therapeutic Exercise:    30     mins  07551;     Neuromuscular Yefri:    0    mins  41874;    Therapeutic Activity:     10     mins  68421;     Self-Care/ADL     0     mins  67524;   Sensory Int. Tech      0     mins 84173;  Ultrasound:     0     mins  96872;    Electrical Stimulation:    0     mins  33025 ( );    Untimed:  Electrical Stimulation:    0     mins  05732 ( );    Timed Treatment:   40   mins "   Total Treatment:     40   mins    OT Signature: Ni Ruiz MS, OTR/L, CDP  [place] License #: [id number]

## 2021-11-16 ENCOUNTER — TREATMENT (OUTPATIENT)
Dept: PHYSICAL THERAPY | Facility: CLINIC | Age: 61
End: 2021-11-16

## 2021-11-16 DIAGNOSIS — Z78.9 IMPAIRED MOBILITY AND ADLS: Primary | ICD-10-CM

## 2021-11-16 DIAGNOSIS — G35 MULTIPLE SCLEROSIS (HCC): Primary | ICD-10-CM

## 2021-11-16 DIAGNOSIS — Z74.09 IMPAIRED MOBILITY AND ADLS: Primary | ICD-10-CM

## 2021-11-16 DIAGNOSIS — R41.841 COGNITIVE COMMUNICATION DEFICIT: Primary | ICD-10-CM

## 2021-11-16 DIAGNOSIS — R27.8 DECREASED COORDINATION: ICD-10-CM

## 2021-11-16 PROCEDURE — 97530 THERAPEUTIC ACTIVITIES: CPT | Performed by: OCCUPATIONAL THERAPIST

## 2021-11-16 PROCEDURE — 97110 THERAPEUTIC EXERCISES: CPT | Performed by: OCCUPATIONAL THERAPIST

## 2021-11-16 PROCEDURE — 97110 THERAPEUTIC EXERCISES: CPT | Performed by: PHYSICAL THERAPIST

## 2021-11-16 PROCEDURE — 97112 NEUROMUSCULAR REEDUCATION: CPT | Performed by: PHYSICAL THERAPIST

## 2021-11-16 PROCEDURE — 92507 TX SP LANG VOICE COMM INDIV: CPT | Performed by: SPEECH-LANGUAGE PATHOLOGIST

## 2021-11-16 NOTE — PROGRESS NOTES
"Outpatient Speech Language Pathology   Adult Speech Language Cognitive Progress Note       Patient Name: Christiane Figueroa  : 1960  MRN: [id number]  Today's Date: 2021         Visit Date: 2021   Patient Active Problem List   Diagnosis   • Ankle injury   • Depression   • Multiple sclerosis (HCC)   • OAB (overactive bladder)   • Neuropathic pain          Visit Dx:    ICD-10-CM ICD-9-CM   1. Cognitive communication deficit  R41.841 799.52     Pain: 0  Subjective: \"writing things down\"   LTGs     Pt and family will implement compensatory strategies to maximize patient’s memory function so patient can continue to participate in daily activities  at 90% with no cues   -modeled/targeted IM/compensatory strategies: 85%  Pt will be able to remember information needed to participate in avocational activities  at 90% with no cues  -targeted home tasks: 85%  STGs  Pt will utilize word finding strategies at 90% with no cues   -SFA visual aid/ model: 85%  Pt’s memory skills will be enhanced as reported by patient by utilizing internal memory strategies to recall up to 3 pieces of information after a 5 minute delay   -name recall: 4/4 after 5 min delay  -grocery list recall: 4/5 after 5 min delay  Pt’s memory skills will be enhanced as reported by patient using external memory aides  at 90% with no cues   -note takin%  Grocery list aid: 100%  Pt will demonstrate improved ability to recall information by listening/reading information and answering  Questions  at 90% with no cues   -article recall: 90% x2  -6 min podcast: 90%  Recipe recall/problem solvin% x2  Pt will demonstrate improved ability to problem solve visuospatial tasks 90% no cues   -80% no cues  certification: 10/4/21-1/3/22   OP SLP Assessment/Plan - 21 1400        SLP Assessment    Functional Problems Speech Language- Adult/Cognition  -RB    Impact on Function: Adult Speech Language/Cognition Restrictions in personal and social " life; Poor attention to task; Trouble learning or remembering new information  -RB    Clinical Impression: Speech Language-Adult/Congnition Mild-Moderate:; Cognitive Communication Impairment  -RB    Functional Problems Comment cog/comm deficits impact daily tasks  -RB    Clinical Impression Comments following HEP nad using strategies at home, receptive to tx, pt reports being more consistent with visual memory aids  -RB    Please refer to paper survey for additional self-reported information Yes  -RB    Please refer to items scanned into chart for additional diagnostic informaiton and handouts as provided by clinician Yes  -RB    SLP Diagnosis cog/comm  -RB    Prognosis Good (comment)  -RB    Patient/caregiver participated in establishment of treatment plan and goals Yes  -RB    Patient would benefit from skilled therapy intervention Yes  -RB       SLP Plan    Frequency 1x/weekly  -RB    Duration 5 weeks  -RB    Planned CPT's? SLP INDIVIDUAL SPEECH THERAPY: 23644  -RB    Expected Duration of Therapy Session (SLP Eval) 45  -RB    Plan Comments continue POC  -RB          User Key  (r) = Recorded By, (t) = Taken By, (c) = Cosigned By    Initials Name Provider Type    Aimee Piedra SLP Speech and Language Pathologist                   OP SLP Education     Row Name 11/16/21 1400       Education    Barriers to Learning No barriers identified  -RB    Education Provided Patient demonstrated recommended strategies; Patient requires further education on strategies, risks  -RB    Assessed Learning needs; Learning motivation; Learning preferences; Learning readiness  -RB    Learning Motivation Strong  -RB    Learning Method Explanation; Demonstration; Teach back; Written materials  -RB    Teaching Response Verbalized understanding; Demonstrated understanding; Reinforcement needed  -RB    Education Comments HEP: visualization handout  -RB          User Key  (r) = Recorded By, (t) = Taken By, (c) = Cosigned By    Initials  Name Effective Dates    Aimee Piedra SLP 06/16/21 -               SLP Outcome Measures (last 72 hours)     SLP Outcome Measures     Row Name 11/16/21 1400             Adult FCM Scores    Memory FCM Score 5  -RB            User Key  (r) = Recorded By, (t) = Taken By, (c) = Cosigned By    Initials Name Effective Dates    Aimee Piedra SLP 06/16/21 -                   Aimee Carmona MA CCC-SLP, [facility]  11/16/2021

## 2021-11-16 NOTE — PROGRESS NOTES
"OT Progress & D/C Note        Patient: Christiane Figueroa   : 1960  Diagnosis/ICD-10 Code:  Impaired mobility and ADLs [Z74.09, Z78.9]  Referring practitioner: Yousuf Zelaya MD  Date of Initial Visit: Type: THERAPY  Noted: 10/4/2021  Today's Date: 2021  Patient seen for 5 sessions      Subjective:   Patient reports: \"I can do so much more with my hands now. I can really tell the strength is better\"  Pain: 0/10  Subjective Questionnaire: 9HPT RIGHT: 21.13 L: 24.41  Pt demonstrates significant improvement in bilateral FMC speed, accuracy and in-hand manipulation which translate into better overall grasp prehension and carryover into ADLs and IADLs.  Clinical Progress: improved  Home Program Compliance: Yes  Treatment has included: therapeutic exercise and therapeutic activity    Subjective   Objective     OT Neuro     Assessment & Plan     Assessment  Assessment details: OT re-assessment completed per POC. Pt has met all goals and achieved max potential with skilled OT services at this time. Pt can independently continue HEPs at home with strengthening and FMC. Pt has returned to sewing and crafting with increased ease, independence and satisfaction. D/C at this time.   Prognosis: good    Goals  Plan Goals:   Pt will improve score by 4 seconds or more on the 9HPT to demonstrate increased accuracy and speed with fine motor coordination by 8 wks.  MET    Pt will be independent with hand strengthening HEP to increase independence with ADL/IADL performance tasks by 4 wks. MET    Pt will be independent with hand FMC HEP to increase independence with ADL/IADL performance tasks by 4 wks. MET    Pt will be independent with B UE strengthening HEP to increase performance in ADL/IADL tasks by 8 wks. MET  .           Plan  Plan details: D/C to independent management of HEPs; pt has met all goals      SEE FLOW SHEET AND ACTIVITY LOG FOR DETAILS    Visit Diagnoses:    ICD-10-CM ICD-9-CM   1. Impaired mobility and ADLs "  Z74.09 V49.89    Z78.9    2. Decreased coordination  R27.8 781.3       Progress toward previous goals: All Met      Recommendations: Discharge    OT Signature: Ni Ruiz MS, OTR/L, CDP  [place] License #: [id number]    Based upon review of the patient's progress and continued therapy plan, it is my medical opinion that Christiane Figueroa should continue occupational therapy treatment at [facility] PHYSICAL THERAPY  [address] [facility] [facility] [address] [phone].    Signature: __________________________________  Yousuf Zelaya MD    Timed:  Manual Therapy:    0     mins  62350;  Therapeutic Exercise:    25     mins  04707;     Neuromuscular Yefri:    0    mins  60023;    Therapeutic Activity:     15     mins  07234;     Self-Care/ADL     0     mins  77536;   Sensory Int. Tech      0     mins 96427;  Ultrasound:     0     mins  47319;    Electrical Stimulation:    0     mins  20105 ( );    Untimed:  Electrical Stimulation:    0     mins  17884 ( );    Timed Treatment:   40   mins   Total Treatment:     40   mins

## 2021-11-16 NOTE — PROGRESS NOTES
"PT Progress Note        Patient: Christiane Figueroa   : 1960  Diagnosis/ICD-10 Code:  Multiple sclerosis (HCC) [G35]  Referring practitioner: Yousuf Zelaya MD  Date of Initial Visit: Type: THERAPY  Noted: 10/4/2021  Today's Date: 2021  Patient seen for 2 sessions      Subjective:   Christiane Figueroa reports: \"I'm trying to focus more about keeping my foot straight when I walk.\"  Subjective Questionnaire: n/a  Clinical Progress: improved  Home Program Compliance: Yes  Treatment has included: therapeutic exercise, neuromuscular re-education and gait training    Subjective   Objective   TU.02sec  10 M: 10.56sec  FGA:   REID/56    PT Neuro         Assessment & Plan     Assessment  Impairments: abnormal coordination, abnormal gait, activity intolerance, impaired balance, impaired physical strength and lacks appropriate home exercise program  Other impairment: balance  Assessment details: Pt met STG for REID today and progressed towards all remaining goals.  Pt demonstrates decreased B hip strength with L LE weaker than the R LE.  Pt to benefit from skilled PT services to improve overall functional mobility.  Prognosis: good  Functional Limitations: carrying objects, lifting, walking, reaching behind back and reaching overhead  Goals  Plan Goals: STG (4 visits)  1. Patient to improve REID balance score to >/= 43 /56 to decrease client's risk of falls. MET  2. Patient to perform TUG within 11 sec without LOB for improved functional mobility. ONGOING  3. Patient to ambulate 10 meters without AD within 10 sec without LOB for improved gait ortiz and functional mobility. ONGOING  4. Patient to improve FGA score to >/= 21 /30 to decrease client's risk of falls. ONGOING    LTG (8 visits)  1. Patient to improve REID balance score to >/= 49 /56 to decrease client's risk of falls. ONGOING  2. Patient to perform TUG within 10 sec without LOB for improved functional mobility. ONGOING  3. Patient to ambulate " "10 meters without AD within 9.5 sec without LOB for improved gait ortiz and functional mobility. ONGOING  4. Patient to improve FGA score to >/= 26 /30 to decrease client's risk of falls. ONGOING  5. Pt to ascend/decend steps without HR\"s using recip pattern without LOB for improved overall functional mobility. ONGOING  6. Patient to be I with HEP. ONGOING    Plan  Therapy options: will be seen for skilled physical therapy services  Planned modality interventions: electrical stimulation/Russian stimulation  Planned therapy interventions: fine motor coordination training, gait training, functional ROM exercises, home exercise program, neuromuscular re-education, strengthening, stretching, therapeutic activities and transfer training  Frequency: 1x week  Duration in visits: 6  Treatment plan discussed with: patient  Plan details: Patient will be seen 1x/wk x 6 visits with treatment to include strengthening, stretching, neuromuscular re-education, balance, gait and endurance training.         Visit Diagnoses:    ICD-10-CM ICD-9-CM   1. Multiple sclerosis (HCC)  G35 340       Progress toward previous goals: Partially Met        Recommendations: Continue as planned  Timeframe: 6 visits  Prognosis to achieve goals: good    PT Signature: Colleen Valle, PT  [place] License #: [id number]    Based upon review of the patient's progress and continued therapy plan, it is my medical opinion that Christiane Figueroa should continue physical therapy treatment at [facility] PHYSICAL THERAPY  [facility] [address] [facility] [address] [phone].    Signature: __________________________________  Yousuf Zelaya MD    Timed:  Manual Therapy:    0     mins  57523;  Therapeutic Exercise:    30     mins  14381;     Neuromuscular Yefri:    15    mins  26854;    Therapeutic Activity:     0     mins  86650;     Gait Trainin     mins  31089;     Ultrasound:     0     mins  58190;    Electrical Stimulation:    " 0     mins  21919 ( );    Untimed:  Electrical Stimulation:    0     mins  98941 ( );  Mechanical Traction:    0     mins  47598;     Timed Treatment:   45   mins   Total Treatment:     45   mins

## 2021-11-30 ENCOUNTER — TREATMENT (OUTPATIENT)
Dept: PHYSICAL THERAPY | Facility: CLINIC | Age: 61
End: 2021-11-30

## 2021-11-30 DIAGNOSIS — R41.841 COGNITIVE COMMUNICATION DEFICIT: Primary | ICD-10-CM

## 2021-11-30 DIAGNOSIS — R26.89 BALANCE PROBLEM: Primary | ICD-10-CM

## 2021-11-30 PROCEDURE — 97112 NEUROMUSCULAR REEDUCATION: CPT | Performed by: PHYSICAL THERAPIST

## 2021-11-30 PROCEDURE — 97110 THERAPEUTIC EXERCISES: CPT | Performed by: PHYSICAL THERAPIST

## 2021-11-30 PROCEDURE — 92507 TX SP LANG VOICE COMM INDIV: CPT | Performed by: SPEECH-LANGUAGE PATHOLOGIST

## 2021-11-30 NOTE — PROGRESS NOTES
"Physical Therapy Daily Treatment Note  Visit: 5  Date of Initial Visit: Type: THERAPY  Noted: 10/4/2021    Christiane Figueroa reports: \"I'm carrying my cane in with me today b/c my legs were a little shaky last time.\"    Subjective Evaluation    Pain  No pain reported           Objective   See Exercise, Manual, and Modality Logs for complete treatment.    Exercise 1  Exercise Name 1: NuStep B UE/LEs  Equipment/Resistance 1: level 6  Time: 8 min  Exercise 2  Exercise Name 2: Fwd, B sidestepping along beam; hold staggered with LE behind tapping cone in front; B full turns standing on both beams; standing sideways with alternating tapping cone in front  Equipment/Resistance 2: balance beam wood and foam; min A with LOB up to 60% of the time with pt grabbing // bars for balance  Sets/Reps 2: 10  Exercise 3  Exercise Name 3: Seated stool pull  Time 3: 3 min  Exercise 4  Exercise Name 4: DLP  Equipment/Resistance 4: total gym  Time 4: 2 min       PT Neuro          Assessment & Plan     Assessment    Assessment details: Pt requires min A with standing dynamic balance activities with LOB up to 60% of the time on foam balance beam. Pt requires several seated rest breaks secondary to B LE fatigue and weakness. Pt to benefit from skilled PT services to improve overall functional mobility.    Plan  Plan details: Pt to benefit from skilled PT services to improve gait, balance, strength, and overall functional mobility.          Manual Therapy:     0     mins  46500;  Therapeutic Exercise:     15     mins  31548;     Neuromuscular Yefri:     30    mins  72307;    Therapeutic Activity:      0     mins  11411;     Gait Trainin     mins  84618;     Ultrasound:      0     mins  50423;    Electrical Stimulation:     0     mins  68140 ( );  Dry Needling      0     mins self-pay  Traction      0     mins 72419  Canalith Repositioning    0     mins 38603      Timed Treatment:   45   mins   Total Treatment:     45   " johny Valle, PT  [place] License #: [id number]    Physical Therapist

## 2021-11-30 NOTE — PROGRESS NOTES
"Outpatient Speech Language Pathology   Adult Speech Language Cognitive Treatment Note       Patient Name: Christiane Figueroa  : 1960  MRN: [id number]  Today's Date: 2021         Visit Date: 2021   Patient Active Problem List   Diagnosis   • Ankle injury   • Depression   • Multiple sclerosis (HCC)   • OAB (overactive bladder)   • Neuropathic pain          Visit Dx:    ICD-10-CM ICD-9-CM   1. Cognitive communication deficit  R41.841 799.52     Pain: 0  Subjective: \"feel more sure of myself\"   LTGs     Pt and family will implement compensatory strategies to maximize patient’s memory function so patient can continue to participate in daily activities  at 90% with no cues   -modeled/targeted IM/compensatory strategies: 85-90%  Pt will be able to remember information needed to participate in avocational activities  at 90% with no cues  -targeted home tasks: 85-90%  STGs  Pt will utilize word finding strategies at 90% with no cues   -SFA visual aid/ model: 85%  Pt’s memory skills will be enhanced as reported by patient by utilizing internal memory strategies to recall up to 3 pieces of information after a 5 minute delay   -name recall: 5/5  after 5 min delay  -places recall: 7/8 after 5 min delay  Pt’s memory skills will be enhanced as reported by patient using external memory aides  at 90% with no cues   -note takin%  Pt will demonstrate improved ability to recall information by listening/reading information and answering  Questions  at 90% with no cues   -article recall: 95% x2  -party plannin%  -10 min podcast: 90%  Pt will demonstrate improved ability to problem solve visuospatial tasks 90% no cues   -85% no cues  certification: 10/4/21-1/3/22   OP SLP Assessment/Plan - 21 1400        SLP Assessment    Functional Problems Speech Language- Adult/Cognition  -RB    Impact on Function: Adult Speech Language/Cognition Restrictions in personal and social life; Poor attention to task; Trouble " learning or remembering new information  -RB    Clinical Impression: Speech Language-Adult/Congnition Mild-Moderate:; Cognitive Communication Impairment  -RB    Functional Problems Comment cog/comm deficits impact daily tasks  -RB    Clinical Impression Comments receptive to tx and strategies, pt reports feeling increased confidence  -RB    Please refer to paper survey for additional self-reported information Yes  -RB    Please refer to items scanned into chart for additional diagnostic informaiton and handouts as provided by clinician Yes  -RB    SLP Diagnosis cog/comm  -RB    Prognosis Good (comment)  -RB    Patient/caregiver participated in establishment of treatment plan and goals Yes  -RB    Patient would benefit from skilled therapy intervention Yes  -RB       SLP Plan    Frequency 1x/weekly  -RB    Duration 4 weeks  -RB    Planned CPT's? SLP INDIVIDUAL SPEECH THERAPY: 80052  -RB    Expected Duration of Therapy Session (SLP Eval) 45  -RB    Plan Comments continue POC  -RB          User Key  (r) = Recorded By, (t) = Taken By, (c) = Cosigned By    Initials Name Provider Type    Aimee Piedra SLP Speech and Language Pathologist                   OP SLP Education     Row Name 11/30/21 1400       Education    Barriers to Learning No barriers identified  -RB    Education Provided Patient demonstrated recommended strategies; Patient requires further education on strategies, risks  -RB    Assessed Learning needs; Learning motivation; Learning preferences; Learning readiness  -RB    Learning Motivation Strong  -RB    Learning Method Explanation; Demonstration; Teach back; Written materials  -RB    Teaching Response Verbalized understanding; Demonstrated understanding; Reinforcement needed  -RB    Education Comments HEP: rehearsal handout  -RB          User Key  (r) = Recorded By, (t) = Taken By, (c) = Cosigned By    Initials Name Effective Dates    Aimee Piedra SLP 06/16/21 Dickson Yu  ILIANA Carmona CCC-SLP, CBIS   11/30/2021

## 2021-12-03 RX ORDER — BUPROPION HYDROCHLORIDE 300 MG/1
300 TABLET ORAL DAILY
Qty: 90 TABLET | Refills: 3 | Status: SHIPPED | OUTPATIENT
Start: 2021-12-03 | End: 2023-03-21 | Stop reason: SDUPTHER

## 2021-12-03 RX ORDER — BUPROPION HYDROCHLORIDE 150 MG/1
150 TABLET ORAL EVERY MORNING
Qty: 90 TABLET | Refills: 3 | Status: SHIPPED | OUTPATIENT
Start: 2021-12-03 | End: 2023-03-21 | Stop reason: SDUPTHER

## 2021-12-03 NOTE — TELEPHONE ENCOUNTER
Rx Refill Note  Requested Prescriptions     Pending Prescriptions Disp Refills   • buPROPion XL (WELLBUTRIN XL) 300 MG 24 hr tablet 90 tablet 3     Sig: Take 1 tablet by mouth Daily.   • buPROPion XL (WELLBUTRIN XL) 150 MG 24 hr tablet 90 tablet 3     Sig: Take 1 tablet by mouth Every Morning. Take one tablet by mouth daily.      Last filled: 8/21/2020, 90 day with 3 refills  Sent these in. Thanks!  Last office visit with prescribing clinician: 8/27/2021      Next office visit with prescribing clinician: 2/28/2022

## 2021-12-03 NOTE — TELEPHONE ENCOUNTER
Caller: WOODY RAYA    Relationship: SELF      Requested Prescriptions: buPROPion XL (WELLBUTRIN XL) 150 MG 24 hr tablet &  buPROPion XL (WELLBUTRIN XL) 300 MG 24 hr tablet  Requested Prescriptions      No prescriptions requested or ordered in this encounter        Pharmacy where request should be sent: [facility] [address] - [phone]  - [phone] FX     Additional details provided by patient: PT NEEDS NEW SCRIPTS SENT TO [facility] FOR THE MEDICATIONS  buPROPion XL (WELLBUTRIN XL) 150 MG 24 hr tablet &  buPROPion XL (WELLBUTRIN XL) 300 MG 24 hr tablet. PT IS ALMOST OUT OF MEDICATIONS     Does the patient have less than a 3 day supply:  [x] Yes  [] No    Lashanda Reid Rep   12/03/21 10:28 EST         IF YOU HAVE ANY QUESTIONS PLEASE CALL HER BACK [phone] OR CAN LEAVE MESSAGE ON THIS CELL NUMBER [phone]

## 2021-12-07 ENCOUNTER — TREATMENT (OUTPATIENT)
Dept: PHYSICAL THERAPY | Facility: CLINIC | Age: 61
End: 2021-12-07

## 2021-12-07 DIAGNOSIS — R26.89 BALANCE PROBLEM: Primary | ICD-10-CM

## 2021-12-07 DIAGNOSIS — R41.841 COGNITIVE COMMUNICATION DEFICIT: Primary | ICD-10-CM

## 2021-12-07 PROCEDURE — 97112 NEUROMUSCULAR REEDUCATION: CPT | Performed by: PHYSICAL THERAPIST

## 2021-12-07 PROCEDURE — 92507 TX SP LANG VOICE COMM INDIV: CPT | Performed by: SPEECH-LANGUAGE PATHOLOGIST

## 2021-12-07 PROCEDURE — 97110 THERAPEUTIC EXERCISES: CPT | Performed by: PHYSICAL THERAPIST

## 2021-12-07 NOTE — PROGRESS NOTES
"Outpatient Speech Language Pathology   Adult Speech Language Cognitive Treatment Note       Patient Name: Christiane Figueroa  : 1960  MRN: [id number]  Today's Date: 2021         Visit Date: 2021   Patient Active Problem List   Diagnosis   • Ankle injury   • Depression   • Multiple sclerosis (HCC)   • OAB (overactive bladder)   • Neuropathic pain          Visit Dx:    ICD-10-CM ICD-9-CM   1. Cognitive communication deficit  R41.841 799.52   Pain: 0  Subjective: \"feel out of sorts\"   LTGs     Pt and family will implement compensatory strategies to maximize patient’s memory function so patient can continue to participate in daily activities  at 90% with no cues   -modeled/targeted IM/compensatory strategies: 85-90%  Pt will be able to remember information needed to participate in avocational activities  at 90% with no cues  -targeted home tasks: 85-90%  STGs  Pt will utilize word finding strategies at 90% with no cues   -SFA visual aid/ model: 85-90%  Pt’s memory skills will be enhanced as reported by patient by utilizing internal memory strategies to recall up to 3 pieces of information after a 5 minute delay   -name recall: 4/4  after 5 min delay  -places recall: 6/6 after 5 min delay  Pt’s memory skills will be enhanced as reported by patient using external memory aides  at 90% with no cues   -note takin%  Pt will demonstrate improved ability to recall information by listening/reading information and answering  Questions  at 90% with no cues   -article recall: 95% x2  -menu: 100%  -shopping ad: 100%  -7min podcast: 90%  Pt will demonstrate improved ability to problem solve visuospatial tasks 90% no cues   -85% no cues  certification: 10/4/21-1/3/22     OP SLP Assessment/Plan - 21 1300        SLP Assessment    Functional Problems Speech Language- Adult/Cognition  -RB    Impact on Function: Adult Speech Language/Cognition Restrictions in personal and social life; Poor attention to task; " "Decreased recall of personal information and medical history  -RB    Clinical Impression: Speech Language-Adult/Congnition Mild-Moderate:; Cognitive Communication Impairment  -RB    Functional Problems Comment cog/comm deficits impact daily tasks/participation  -RB    Clinical Impression Comments PT reports \"feeling out of sorts\" this week, follows HEP consistently  -RB    Please refer to paper survey for additional self-reported information Yes  -RB    Please refer to items scanned into chart for additional diagnostic informaiton and handouts as provided by clinician Yes  -RB    SLP Diagnosis cog/comm  -RB    Prognosis Good (comment)  -RB    Patient/caregiver participated in establishment of treatment plan and goals Yes  -RB    Patient would benefit from skilled therapy intervention Yes  -RB       SLP Plan    Frequency 1x/weekly  -RB    Duration 3 weeks  -RB    Planned CPT's? SLP INDIVIDUAL SPEECH THERAPY: 88910  -RB    Expected Duration of Therapy Session (SLP Eval) 45  -RB    Plan Comments continue POC  -RB          User Key  (r) = Recorded By, (t) = Taken By, (c) = Cosigned By    Initials Name Provider Type    Aimee Piedra SLP Speech and Language Pathologist                   OP SLP Education     Row Name 12/07/21 1300       Education    Barriers to Learning No barriers identified  -RB    Education Provided Patient demonstrated recommended strategies; Patient requires further education on strategies, risks  -RB    Assessed Learning needs; Learning motivation; Learning preferences; Learning readiness  -RB    Learning Motivation Strong  -RB    Learning Method Explanation; Demonstration; Teach back; Written materials  -RB    Teaching Response Verbalized understanding; Demonstrated understanding; Reinforcement needed  -RB    Education Comments HEP: elaboration handout  -RB          User Key  (r) = Recorded By, (t) = Taken By, (c) = Cosigned By    Initials Name Effective Dates    Aimee Piedra SLP 06/16/21 " -                       Aimee Carmona MA CCC-SLP, CBIS  12/7/2021

## 2021-12-07 NOTE — PROGRESS NOTES
"Physical Therapy Daily Treatment Note  Visit: 6  Date of Initial Visit: Type: THERAPY  Noted: 10/4/2021    Christiane Pinkd reports: \"I'm not feeling well.  I'm really tired and I'm not sure if it's left over from me getting the booster or not.\"    Subjective Evaluation    Pain  No pain reported           Objective   See Exercise, Manual, and Modality Logs for complete treatment.    Exercise 1  Exercise Name 1: NuStep B UE/LEs  Equipment/Resistance 1: level 6  Time: 8 min  Exercise 2  Exercise Name 2: Fwd, B sidestepping along ladder with one foot in every square, every other square; hold staggered with toss/catch ball, bounce/catch ball; fwd straddle stepping along agility ladder  Equipment/Resistance 2: agility ladder; min/mod A with LOB up to 20% of the time  Sets/Reps 2: 60' x 2 of each  Exercise 3  Exercise Name 3: Quadraped B UE/LEs with 3 sec hold  Sets/Reps 3: 5       PT Neuro          Assessment & Plan     Assessment    Assessment details: Pt requires min/mod A with standing dynamic balance activities with LOB up to 20% of the time.  Pt requires several seated rest breaks secondary to fatigue in B LEs. Pt to benefit from skilled PT services to improve overall functional mobility.     Plan  Plan details: Pt to benefit from skilled PT services to improve gait, balance, strength, and overall functional mobility.          Manual Therapy:     0     mins  15517;  Therapeutic Exercise:     10     mins  18564;     Neuromuscular Yefri:     30    mins  11863;    Therapeutic Activity:      0     mins  00263;     Gait Trainin     mins  05791;     Ultrasound:      0     mins  60702;    Electrical Stimulation:     0     mins  74461 ( );  Dry Needling      0     mins self-pay  Traction      0     mins 33097  Canalith Repositioning    0     mins 18906      Timed Treatment:   40   mins   Total Treatment:     40   mins    Colleen Valle, PT  [place] License #: [id number]    Physical Therapist    "

## 2021-12-08 ENCOUNTER — TRANSCRIBE ORDERS (OUTPATIENT)
Dept: PHYSICAL THERAPY | Facility: CLINIC | Age: 61
End: 2021-12-08

## 2021-12-08 DIAGNOSIS — G35 MULTIPLE SCLEROSIS (HCC): Primary | ICD-10-CM

## 2021-12-14 ENCOUNTER — TREATMENT (OUTPATIENT)
Dept: PHYSICAL THERAPY | Facility: CLINIC | Age: 61
End: 2021-12-14

## 2021-12-14 DIAGNOSIS — R26.89 BALANCE PROBLEM: Primary | ICD-10-CM

## 2021-12-14 DIAGNOSIS — R41.841 COGNITIVE COMMUNICATION DEFICIT: Primary | ICD-10-CM

## 2021-12-14 PROCEDURE — 92507 TX SP LANG VOICE COMM INDIV: CPT | Performed by: SPEECH-LANGUAGE PATHOLOGIST

## 2021-12-14 PROCEDURE — 97112 NEUROMUSCULAR REEDUCATION: CPT | Performed by: PHYSICAL THERAPIST

## 2021-12-14 PROCEDURE — 97110 THERAPEUTIC EXERCISES: CPT | Performed by: PHYSICAL THERAPIST

## 2021-12-14 NOTE — PROGRESS NOTES
"Outpatient Speech Language Pathology   Adult Speech Language Cognitive Progress Note       Patient Name: Christiane Figueroa  : 1960  MRN: [id number]  Today's Date: 2021         Visit Date: 2021   Patient Active Problem List   Diagnosis   • Ankle injury   • Depression   • Multiple sclerosis (HCC)   • OAB (overactive bladder)   • Neuropathic pain          Visit Dx:    ICD-10-CM ICD-9-CM   1. Cognitive communication deficit  R41.841 799.52   Pain: 0  Subjective: \"feeling better\"   LTGs     Pt and family will implement compensatory strategies to maximize patient’s memory function so patient can continue to participate in daily activities  at 90% with no cues   -modeled/targeted IM/compensatory strategies: 90%. MET  Pt will be able to remember information needed to participate in avocational activities  at 90% with no cues  -targeted home tasks: 90%. MET  STGs  Pt will utilize word finding strategies at 90% with no cues   -SFA visual aid/ model: 90%. MET  Pt’s memory skills will be enhanced as reported by patient by utilizing internal memory strategies to recall up to 3 pieces of information after a 5 minute delay   -name recall: 4/4  after 5 min delay  -to do list: 4/5 after 5 min delay  MET  Pt’s memory skills will be enhanced as reported by patient using external memory aides  at 90% with no cues   -note takin%. MET   Pt will demonstrate improved ability to recall information by listening/reading information and answering  Questions  at 90% with no cues   -article recall: 95% x2  -invoice: 100%  -bank letter: 100%  -6 min podcast: 90%  MET  Pt will demonstrate improved ability to problem solve visuospatial tasks 90% no cues   -90% no cues  MET  certification: 10/4/21-1/3/22     OP SLP Assessment/Plan - 21 1300        SLP Assessment    Functional Problems Speech Language- Adult/Cognition  -RB    Impact on Function: Adult Speech Language/Cognition Restrictions in personal and social life; " Poor attention to task; Trouble learning or remembering new information  -RB    Clinical Impression: Speech Language-Adult/Congnition Mild-Moderate:; Cognitive Communication Impairment  -RB    Functional Problems Comment cog/comm deficits impact daily tasks/participation  -RB    Clinical Impression Comments pt feels more confident with cognition and strategies, follows HEP consistently  -RB    Please refer to paper survey for additional self-reported information Yes  -RB    Please refer to items scanned into chart for additional diagnostic informaiton and handouts as provided by clinician Yes  -RB    SLP Diagnosis cog/comm  -RB    Prognosis Good (comment)  -RB    Patient/caregiver participated in establishment of treatment plan and goals Yes  -RB    Patient would benefit from skilled therapy intervention Yes  -RB       SLP Plan    Frequency 1x/weekly  -RB    Duration 2 weeks  -RB    Planned CPT's? SLP INDIVIDUAL SPEECH THERAPY: 12296  -RB    Expected Duration of Therapy Session (SLP Eval) 45  -RB    Plan Comments continue POC  -RB          User Key  (r) = Recorded By, (t) = Taken By, (c) = Cosigned By    Initials Name Provider Type    Aimee Piedra SLP Speech and Language Pathologist                   OP SLP Education     Row Name 12/14/21 1300       Education    Barriers to Learning No barriers identified  -RB    Education Provided Patient demonstrated recommended strategies; Patient requires further education on strategies, risks  -RB    Assessed Learning needs; Learning motivation; Learning preferences; Learning readiness  -RB    Learning Motivation Strong  -RB    Learning Method Explanation; Teach back; Demonstration; Written materials  -RB    Teaching Response Verbalized understanding; Demonstrated understanding; Reinforcement needed  -RB    Education Comments HEP: IM practice  -RB          User Key  (r) = Recorded By, (t) = Taken By, (c) = Cosigned By    Initials Name Effective Dates    RB Aimee Carmona,  SLP 06/16/21 -               SLP Outcome Measures (last 72 hours)     SLP Outcome Measures     Row Name 12/14/21 1300             Adult FCM Scores    Memory FCM Score 5  -RB            User Key  (r) = Recorded By, (t) = Taken By, (c) = Cosigned By    Initials Name Effective Dates    Aimee Piedra SLP 06/16/21 -             Speech Language Pathology Discharge Summary               OP SLP Discharge Summary  Date of Discharge: 12/14/21  Reason for Discharge: all goals and outcomes met, no further needs identified  Progress Toward Achieving Short/long Term Goals: all goals met within established timelines  Discharge Destination: home  Discharge Instructions: f/u as needed          Aimee Carmona MA CCC- SLP, CBIS  12/14/2021

## 2021-12-14 NOTE — PROGRESS NOTES
"PT Progress Note        Patient: Christiane Figueroa   : 1960  Diagnosis/ICD-10 Code:  Balance problem [R26.89]  Referring practitioner: Yousuf Zelaya MD  Date of Initial Visit: Type: THERAPY  Noted: 10/4/2021  Today's Date: 2021  Patient seen for 7 sessions      Subjective:   Christiane Figueroa reports: \"I can tell that I'm better.  I feel much better today than last time I was here.\"  Subjective Questionnaire: n/a  Clinical Progress: improved  Home Program Compliance: Yes  Treatment has included: therapeutic exercise, neuromuscular re-education and gait training    Subjective   Objective   TU.29sec  10 M: 9.84/8.83sec  FGA:  REID/56    PT Neuro         Assessment & Plan     Assessment  Other impairment: balance    Assessment details: Pt met all STG and LTG's today.  Pt to continue with HEP and be discharged from PT services.    Goals  Plan Goals: STG (4 visits)  1. Patient to improve REID balance score to >/= 43 /56 to decrease client's risk of falls. MET  2. Patient to perform TUG within 11 sec without LOB for improved functional mobility. MET  3. Patient to ambulate 10 meters without AD within 10 sec without LOB for improved gait ortiz and functional mobility. MET  4. Patient to improve FGA score to >/= 21 /30 to decrease client's risk of falls. MET    LTG (8 visits)  1. Patient to improve REID balance score to >/= 49 /56 to decrease client's risk of falls. MET  2. Patient to perform TUG within 10 sec without LOB for improved functional mobility. MET  3. Patient to ambulate 10 meters without AD within 9.5 sec without LOB for improved gait ortiz and functional mobility. MET  4. Patient to improve FGA score to >/= 26 /30 to decrease client's risk of falls. MET  5. Pt to ascend/decend steps without HR\"s using recip pattern without LOB for improved overall functional mobility. MET  6. Patient to be I with HEP. MET    Plan  Plan details: Patient will be discharged from PT services and " continue with HEP.        Visit Diagnoses:    ICD-10-CM ICD-9-CM   1. Balance problem  R26.89 781.99       Progress toward previous goals: All Met      Recommendations: discharge  Timeframe: n/a  Prognosis to achieve goals: n/a    PT Signature: Colleen Valle, CHING VARGAS License #: [id number]    Based upon review of the patient's progress and continued therapy plan, it is my medical opinion that Christiane Figueroa should be discharged from physical therapy treatment at [facility] PHYSICAL THERAPY  [facility] [address] [facility] [id number] [phone].    Signature: __________________________________  Yousuf Zelaya MD    Timed:  Manual Therapy:    0     mins  75158;  Therapeutic Exercise:    15     mins  50836;     Neuromuscular Yefri:    30    mins  17920;    Therapeutic Activity:     0     mins  80992;     Gait Trainin     mins  62364;     Ultrasound:     0     mins  85224;    Electrical Stimulation:    0     mins  28678 ( );    Untimed:  Electrical Stimulation:    0     mins  12449 ( );  Mechanical Traction:    0     mins  52062;     Timed Treatment:   45   mins   Total Treatment:     45   mins

## 2021-12-16 ENCOUNTER — SPECIALTY PHARMACY (OUTPATIENT)
Dept: ONCOLOGY | Facility: HOSPITAL | Age: 61
End: 2021-12-16

## 2021-12-16 NOTE — PROGRESS NOTES
Specialty Pharmacy Refill Coordination Note     Enrolled patient in the [place] program. Patient receives [facility] from [facility]. Plan to f/u in 1 year.                   Follow-up: 12/7/2022     Colleen Larose, Pharmacy Technician  Specialty Pharmacy Technician

## 2022-03-24 DIAGNOSIS — G35 MULTIPLE SCLEROSIS: Chronic | ICD-10-CM

## 2022-03-24 RX ORDER — GABAPENTIN 300 MG/1
CAPSULE ORAL
Qty: 720 CAPSULE | Refills: 0 | Status: SHIPPED | OUTPATIENT
Start: 2022-03-24 | End: 2022-10-17 | Stop reason: SDUPTHER

## 2022-03-24 NOTE — TELEPHONE ENCOUNTER
Rx Refill Note  Requested Prescriptions     Pending Prescriptions Disp Refills   • gabapentin (NEURONTIN) 300 MG capsule 720 capsule 1     Sig: Take two capsules am and pm, and four capsules at bedtime      Last filled: 8/27/2021 90 day supply with 1 refill.   Last office visit with prescribing clinician: 8/27/2021      Next office visit with prescribing clinician: 3/28/2022     Pending to provider.     Keysha Poole MA  03/24/22, 11:21 EDT

## 2022-03-28 ENCOUNTER — LAB (OUTPATIENT)
Dept: LAB | Facility: HOSPITAL | Age: 62
End: 2022-03-28

## 2022-03-28 ENCOUNTER — OFFICE VISIT (OUTPATIENT)
Dept: NEUROLOGY | Facility: CLINIC | Age: 62
End: 2022-03-28

## 2022-03-28 VITALS
WEIGHT: 201 LBS | HEIGHT: 67 IN | HEART RATE: 80 BPM | SYSTOLIC BLOOD PRESSURE: 112 MMHG | DIASTOLIC BLOOD PRESSURE: 74 MMHG | OXYGEN SATURATION: 99 % | BODY MASS INDEX: 31.55 KG/M2 | TEMPERATURE: 97.3 F

## 2022-03-28 DIAGNOSIS — G35 MULTIPLE SCLEROSIS: Chronic | ICD-10-CM

## 2022-03-28 DIAGNOSIS — M79.2 NEUROPATHIC PAIN: Chronic | ICD-10-CM

## 2022-03-28 DIAGNOSIS — G35 MULTIPLE SCLEROSIS: Primary | Chronic | ICD-10-CM

## 2022-03-28 DIAGNOSIS — F33.41 RECURRENT MAJOR DEPRESSIVE DISORDER, IN PARTIAL REMISSION: Chronic | ICD-10-CM

## 2022-03-28 PROCEDURE — 99214 OFFICE O/P EST MOD 30 MIN: CPT | Performed by: PSYCHIATRY & NEUROLOGY

## 2022-03-28 RX ORDER — BUSPIRONE HYDROCHLORIDE 5 MG/1
5 TABLET ORAL 2 TIMES DAILY
Qty: 60 TABLET | Refills: 5 | Status: SHIPPED | OUTPATIENT
Start: 2022-03-28 | End: 2023-03-21 | Stop reason: SINTOL

## 2022-03-28 RX ORDER — METHYLPREDNISOLONE 4 MG/1
TABLET ORAL
COMMUNITY
Start: 2022-03-18 | End: 2022-03-28

## 2022-03-28 NOTE — TELEPHONE ENCOUNTER
While rooming Christiane today she admits she forgot all about taking her Buspar. Last filled in 2019. I will go ahead and send refills in as it has been due and she will start taking this. THanks!    Sent to [facility] pharmacy on file- she no longer uses [facility].

## 2022-03-28 NOTE — PROGRESS NOTES
"Chief Complaint    Multiple Sclerosis    Subjective          Christiane Figueroa presents to [facility] NEUROLOGY     History of Present Illness     61 y.o. female returns in follow up.  Last visit on 8/27/21 continued Aubagio,  mg BID and 1200 mg qhs, Wellbutrin, referred to PT/OT.       MRI Brain/cervical, my review of films. 9/23/21 as compared to 10/31/19 - no new/enlarging/enhancing lesions  right frontal, right centrum semiovale PVWM, scattered T2 lesions       8/27/2021:  CBC, CMP - NCS     RRMS     Finished therapy and continues to do exercises.     Tolerating Aubagio.       Previous DMD:  Rebif, Tecfidera. Gilenya(macular edema).     MDD      Mood is down during the winter.     Wellbutrin 450 mg daily improved mood.      OAB     Controlled off meds.       Neuropathic pain      Leg pains are controlled on  mg              Objective   Vital Signs:   /74   Pulse 80   Temp 97.3 °F (36.3 °C)   Ht 170.2 cm (67.01\")   Wt 91.2 kg (201 lb)   SpO2 99%   BMI 31.47 kg/m²            Physical Exam  Eyes:      Extraocular Movements: EOM normal.      Pupils: Pupils are equal, round, and reactive to light.   Neurological:      Mental Status: She is oriented to person, place, and time.      Gait: Gait is intact.      Deep Tendon Reflexes: Strength normal.   Psychiatric:         Speech: Speech normal.          Neurologic Exam     Mental Status   Oriented to person, place, and time.   Speech: speech is normal   Level of consciousness: alert  Knowledge: good and consistent with education.   Normal comprehension.     Cranial Nerves   Cranial nerves II through XII intact.     CN II   Visual fields full to confrontation.   Visual acuity: normal  Right visual field deficit: none  Left visual field deficit: none     CN III, IV, VI   Pupils are equal, round, and reactive to light.  Extraocular motions are normal.   Nystagmus: none   Diplopia: none  Ophthalmoparesis: none  Upgaze: " normal  Downgaze: normal  Conjugate gaze: present    CN V   Facial sensation intact.   Right corneal reflex: normal  Left corneal reflex: normal    CN VII   Right facial weakness: none  Left facial weakness: none    CN VIII   Hearing: intact    CN IX, X   Palate: symmetric  Right gag reflex: normal  Left gag reflex: normal    CN XI   Right sternocleidomastoid strength: normal  Left sternocleidomastoid strength: normal    CN XII   Tongue: not atrophic  Fasciculations: absent  Tongue deviation: none    Motor Exam   Muscle bulk: normal  Overall muscle tone: normal    Strength   Strength 5/5 throughout.     Sensory Exam   Light touch normal.     Gait, Coordination, and Reflexes     Gait  Gait: normal    Tremor   Resting tremor: absent  Intention tremor: absent  Action tremor: absent    Reflexes   Reflexes 2+ except as noted.      Result Review :   The following data was reviewed by: Yousuf Zelaya MD on 03/28/2022:  Common labs    Common Labsle 8/27/21 8/27/21    1040 1040   Glucose 98    BUN 23    Creatinine 0.60    eGFR Non  Am 101    eGFR African Am 123    Sodium 142    Potassium 4.7    Chloride 99    Calcium 10.0    Total Protein 7.1    Albumin 4.90    Total Bilirubin 0.2    Alkaline Phosphatase 60    AST (SGOT) 20    ALT (SGPT) 19    WBC  4.97   Hemoglobin  14.3   Hematocrit  45.1   Platelets  182      Comments are available for some flowsheets but are not being displayed.           Data reviewed: Radiologic studies MRI B/C          Assessment and Plan    Diagnoses and all orders for this visit:    1. Multiple sclerosis (HCC) (Primary)  Assessment & Plan:  Sx stable on Aubagio    CBC,CMP      Orders:  -     CBC & Differential; Future  -     Comprehensive Metabolic Panel; Future    2. Neuropathic pain  Assessment & Plan:  Continue GBP       3. Recurrent major depressive disorder, in partial remission (HCC)  Assessment & Plan:  Patient's depression is recurrent and is mild without psychosis. Their  depression is currently in partial remission and the condition is unchanged. This will be reassessed at the next regular appointment. F/U as described:patient will continue current medication therapy.        Follow Up   No follow-ups on file.  Patient was given instructions and counseling regarding her condition or for health maintenance advice. Please see specific information pulled into the AVS if appropriate.

## 2022-03-29 LAB
ALBUMIN SERPL-MCNC: 4.8 G/DL (ref 3.5–5.2)
ALBUMIN/GLOB SERPL: 1.9 G/DL
ALP SERPL-CCNC: 62 U/L (ref 39–117)
ALT SERPL-CCNC: 16 U/L (ref 1–33)
AST SERPL-CCNC: 15 U/L (ref 1–32)
BASOPHILS # BLD AUTO: 0.05 10*3/MM3 (ref 0–0.2)
BASOPHILS NFR BLD AUTO: 1 % (ref 0–1.5)
BILIRUB SERPL-MCNC: 0.2 MG/DL (ref 0–1.2)
BUN SERPL-MCNC: 15 MG/DL (ref 8–23)
BUN/CREAT SERPL: 24.6 (ref 7–25)
CALCIUM SERPL-MCNC: 9.7 MG/DL (ref 8.6–10.5)
CHLORIDE SERPL-SCNC: 99 MMOL/L (ref 98–107)
CO2 SERPL-SCNC: 28.6 MMOL/L (ref 22–29)
CREAT SERPL-MCNC: 0.61 MG/DL (ref 0.57–1)
EGFRCR SERPLBLD CKD-EPI 2021: 101.2 ML/MIN/1.73
EOSINOPHIL # BLD AUTO: 0.12 10*3/MM3 (ref 0–0.4)
EOSINOPHIL NFR BLD AUTO: 2.3 % (ref 0.3–6.2)
ERYTHROCYTE [DISTWIDTH] IN BLOOD BY AUTOMATED COUNT: 12.7 % (ref 12.3–15.4)
GLOBULIN SER CALC-MCNC: 2.5 GM/DL
GLUCOSE SERPL-MCNC: 112 MG/DL (ref 65–99)
HCT VFR BLD AUTO: 43.6 % (ref 34–46.6)
HGB BLD-MCNC: 14.2 G/DL (ref 12–15.9)
IMM GRANULOCYTES # BLD AUTO: 0.01 10*3/MM3 (ref 0–0.05)
IMM GRANULOCYTES NFR BLD AUTO: 0.2 % (ref 0–0.5)
LYMPHOCYTES # BLD AUTO: 1.14 10*3/MM3 (ref 0.7–3.1)
LYMPHOCYTES NFR BLD AUTO: 21.8 % (ref 19.6–45.3)
MCH RBC QN AUTO: 30 PG (ref 26.6–33)
MCHC RBC AUTO-ENTMCNC: 32.6 G/DL (ref 31.5–35.7)
MCV RBC AUTO: 92.2 FL (ref 79–97)
MONOCYTES # BLD AUTO: 0.41 10*3/MM3 (ref 0.1–0.9)
MONOCYTES NFR BLD AUTO: 7.9 % (ref 5–12)
NEUTROPHILS # BLD AUTO: 3.49 10*3/MM3 (ref 1.7–7)
NEUTROPHILS NFR BLD AUTO: 66.8 % (ref 42.7–76)
NRBC BLD AUTO-RTO: 0 /100 WBC (ref 0–0.2)
PLATELET # BLD AUTO: 189 10*3/MM3 (ref 140–450)
POTASSIUM SERPL-SCNC: 4.6 MMOL/L (ref 3.5–5.2)
PROT SERPL-MCNC: 7.3 G/DL (ref 6–8.5)
RBC # BLD AUTO: 4.73 10*6/MM3 (ref 3.77–5.28)
SODIUM SERPL-SCNC: 142 MMOL/L (ref 136–145)
WBC # BLD AUTO: 5.22 10*3/MM3 (ref 3.4–10.8)

## 2022-04-14 ENCOUNTER — DOCUMENTATION (OUTPATIENT)
Dept: ONCOLOGY | Facility: HOSPITAL | Age: 62
End: 2022-04-14

## 2022-06-29 ENCOUNTER — DOCUMENTATION (OUTPATIENT)
Dept: ONCOLOGY | Facility: HOSPITAL | Age: 62
End: 2022-06-29

## 2022-09-29 ENCOUNTER — LAB (OUTPATIENT)
Dept: LAB | Facility: HOSPITAL | Age: 62
End: 2022-09-29

## 2022-09-29 ENCOUNTER — OFFICE VISIT (OUTPATIENT)
Dept: NEUROLOGY | Facility: CLINIC | Age: 62
End: 2022-09-29

## 2022-09-29 VITALS
TEMPERATURE: 97.5 F | HEART RATE: 77 BPM | HEIGHT: 67 IN | SYSTOLIC BLOOD PRESSURE: 126 MMHG | BODY MASS INDEX: 31.39 KG/M2 | WEIGHT: 200 LBS | DIASTOLIC BLOOD PRESSURE: 80 MMHG | OXYGEN SATURATION: 98 %

## 2022-09-29 DIAGNOSIS — M79.2 NEUROPATHIC PAIN: Chronic | ICD-10-CM

## 2022-09-29 DIAGNOSIS — G35 MULTIPLE SCLEROSIS: Primary | Chronic | ICD-10-CM

## 2022-09-29 DIAGNOSIS — G35 MULTIPLE SCLEROSIS: Chronic | ICD-10-CM

## 2022-09-29 DIAGNOSIS — F33.41 RECURRENT MAJOR DEPRESSIVE DISORDER, IN PARTIAL REMISSION: Chronic | ICD-10-CM

## 2022-09-29 PROBLEM — R73.9 HYPERGLYCEMIA: Status: ACTIVE | Noted: 2022-08-15

## 2022-09-29 PROBLEM — I10 ESSENTIAL HYPERTENSION: Status: ACTIVE | Noted: 2021-08-12

## 2022-09-29 PROCEDURE — 99214 OFFICE O/P EST MOD 30 MIN: CPT | Performed by: PSYCHIATRY & NEUROLOGY

## 2022-09-29 NOTE — PROGRESS NOTES
"Chief Complaint    Multiple Sclerosis    Subjective        Christiane Figueroa presents to [facility] NEUROLOGY     History of Present Illness    62 y.o. female returns in follow up.  Last visit on 3/28/22 continued Aubagio, GBP, Wellbutrin,  ordered labs.    MRI Brain/cervical, my review of films. 9/23/21 as compared to 10/31/19 - no new/enlarging/enhancing lesions  right frontal, right centrum semiovale PVWM, scattered T2 lesions       3/28/2022:  CBC, CMP - NCS     RRMS     MSFC improved.      Tolerating Aubagio.       Previous DMD:  Rebif, Tecfidera. Gilenya(macular edema).     MDD      Mood is stable     Wellbutrin 450 mg daily improved mood.      OAB     Controlled off meds.       Neuropathic pain      Leg pains are controlled on  mg               Objective   Vital Signs:  /80   Pulse 77   Temp 97.5 °F (36.4 °C)   Ht 170.2 cm (67.01\")   Wt 90.7 kg (200 lb)   SpO2 98%   BMI 31.32 kg/m²   Estimated body mass index is 31.32 kg/m² as calculated from the following:    Height as of this encounter: 170.2 cm (67.01\").    Weight as of this encounter: 90.7 kg (200 lb).          Physical Exam  Eyes:      Extraocular Movements: EOM normal.      Pupils: Pupils are equal, round, and reactive to light.   Neurological:      Mental Status: She is oriented to person, place, and time.      Gait: Gait is intact.      Deep Tendon Reflexes: Strength normal.   Psychiatric:         Speech: Speech normal.          Neurologic Exam     Mental Status   Oriented to person, place, and time.   Speech: speech is normal   Level of consciousness: alert  Knowledge: good and consistent with education.   Normal comprehension.     Cranial Nerves   Cranial nerves II through XII intact.     CN II   Visual fields full to confrontation.   Visual acuity: normal  Right visual field deficit: none  Left visual field deficit: none     CN III, IV, VI   Pupils are equal, round, and reactive to light.  Extraocular motions are " normal.   Nystagmus: none   Diplopia: none  Ophthalmoparesis: none  Upgaze: normal  Downgaze: normal  Conjugate gaze: present    CN V   Facial sensation intact.   Right corneal reflex: normal  Left corneal reflex: normal    CN VII   Right facial weakness: none  Left facial weakness: none    CN VIII   Hearing: intact    CN IX, X   Palate: symmetric  Right gag reflex: normal  Left gag reflex: normal    CN XI   Right sternocleidomastoid strength: normal  Left sternocleidomastoid strength: normal    CN XII   Tongue: not atrophic  Fasciculations: absent  Tongue deviation: none    Motor Exam   Muscle bulk: normal  Overall muscle tone: normal    Strength   Strength 5/5 throughout.     Sensory Exam   Light touch normal.     Gait, Coordination, and Reflexes     Gait  Gait: normal    Tremor   Resting tremor: absent  Intention tremor: absent  Action tremor: absent    Reflexes   Reflexes 2+ except as noted.      Result Review :  The following data was reviewed by: Yousuf Zelaya MD on 09/29/2022:              Assessment and Plan   Diagnoses and all orders for this visit:    1. Multiple sclerosis (HCC) (Primary)  Assessment & Plan:  MSFC improved with wt loss and exercise.     Continue Aubagio         2. Neuropathic pain  Assessment & Plan:  Continue GBP       3. Recurrent major depressive disorder, in partial remission (HCC)  Assessment & Plan:  Patient's depression is recurrent and is moderate without psychosis. Their depression is currently in partial remission and the condition is improving with treatment. This will be reassessed at the next regular appointment. F/U as described:patient will continue current medication therapy.             Follow Up   No follow-ups on file.  Patient was given instructions and counseling regarding her condition or for health maintenance advice. Please see specific information pulled into the AVS if appropriate.

## 2022-09-30 LAB
ALBUMIN SERPL-MCNC: 4.8 G/DL (ref 3.5–5.2)
ALBUMIN/GLOB SERPL: 2.2 G/DL
ALP SERPL-CCNC: 54 U/L (ref 39–117)
ALT SERPL-CCNC: 16 U/L (ref 1–33)
AST SERPL-CCNC: 20 U/L (ref 1–32)
BASOPHILS # BLD AUTO: 0.07 10*3/MM3 (ref 0–0.2)
BASOPHILS NFR BLD AUTO: 1.7 % (ref 0–1.5)
BILIRUB SERPL-MCNC: 0.3 MG/DL (ref 0–1.2)
BUN SERPL-MCNC: 15 MG/DL (ref 8–23)
BUN/CREAT SERPL: 22.4 (ref 7–25)
CALCIUM SERPL-MCNC: 9.3 MG/DL (ref 8.6–10.5)
CHLORIDE SERPL-SCNC: 98 MMOL/L (ref 98–107)
CO2 SERPL-SCNC: 32.2 MMOL/L (ref 22–29)
CREAT SERPL-MCNC: 0.67 MG/DL (ref 0.57–1)
EGFRCR SERPLBLD CKD-EPI 2021: 98.4 ML/MIN/1.73
EOSINOPHIL # BLD AUTO: 0.2 10*3/MM3 (ref 0–0.4)
EOSINOPHIL NFR BLD AUTO: 4.9 % (ref 0.3–6.2)
ERYTHROCYTE [DISTWIDTH] IN BLOOD BY AUTOMATED COUNT: 12.7 % (ref 12.3–15.4)
GLOBULIN SER CALC-MCNC: 2.2 GM/DL
GLUCOSE SERPL-MCNC: 110 MG/DL (ref 65–99)
HCT VFR BLD AUTO: 41.1 % (ref 34–46.6)
HGB BLD-MCNC: 13.5 G/DL (ref 12–15.9)
IMM GRANULOCYTES # BLD AUTO: 0.01 10*3/MM3 (ref 0–0.05)
IMM GRANULOCYTES NFR BLD AUTO: 0.2 % (ref 0–0.5)
LYMPHOCYTES # BLD AUTO: 1.22 10*3/MM3 (ref 0.7–3.1)
LYMPHOCYTES NFR BLD AUTO: 29.7 % (ref 19.6–45.3)
MCH RBC QN AUTO: 30 PG (ref 26.6–33)
MCHC RBC AUTO-ENTMCNC: 32.8 G/DL (ref 31.5–35.7)
MCV RBC AUTO: 91.3 FL (ref 79–97)
MONOCYTES # BLD AUTO: 0.41 10*3/MM3 (ref 0.1–0.9)
MONOCYTES NFR BLD AUTO: 10 % (ref 5–12)
NEUTROPHILS # BLD AUTO: 2.2 10*3/MM3 (ref 1.7–7)
NEUTROPHILS NFR BLD AUTO: 53.5 % (ref 42.7–76)
NRBC BLD AUTO-RTO: 0 /100 WBC (ref 0–0.2)
PLATELET # BLD AUTO: 195 10*3/MM3 (ref 140–450)
POTASSIUM SERPL-SCNC: 4.4 MMOL/L (ref 3.5–5.2)
PROT SERPL-MCNC: 7 G/DL (ref 6–8.5)
RBC # BLD AUTO: 4.5 10*6/MM3 (ref 3.77–5.28)
SODIUM SERPL-SCNC: 140 MMOL/L (ref 136–145)
WBC # BLD AUTO: 4.11 10*3/MM3 (ref 3.4–10.8)

## 2022-10-17 DIAGNOSIS — G35 MULTIPLE SCLEROSIS: Chronic | ICD-10-CM

## 2022-10-17 RX ORDER — GABAPENTIN 300 MG/1
CAPSULE ORAL
Qty: 720 CAPSULE | Refills: 0 | Status: SHIPPED | OUTPATIENT
Start: 2022-10-17 | End: 2023-03-21 | Stop reason: SDUPTHER

## 2022-10-17 NOTE — TELEPHONE ENCOUNTER
Rx Refill Note  Requested Prescriptions     Pending Prescriptions Disp Refills   • gabapentin (NEURONTIN) 300 MG capsule 720 capsule 0     Sig: Take two capsules am and pm, and four capsules at bedtime      Last filled:  720 with 0 refills.  Last office visit with prescribing clinician: 09/29/2022      Next office visit with prescribing clinician: 03/21/2023     Keysha Poole MA  10/17/22, 11:15 EDT

## 2022-12-06 RX ORDER — ERGOCALCIFEROL 1.25 MG/1
50000 CAPSULE ORAL
Qty: 12 CAPSULE | Refills: 3 | Status: SHIPPED | OUTPATIENT
Start: 2022-12-06

## 2022-12-06 NOTE — TELEPHONE ENCOUNTER
Rx Refill Note  Requested Prescriptions     Pending Prescriptions Disp Refills   • vitamin D (ERGOCALCIFEROL) 1.25 MG (02584 UT) capsule capsule 12 capsule 3     Sig: Take 1 capsule by mouth Every 7 (Seven) Days.      Last filled: 9/20/21 90 day with 3 refills sent this in  Last office visit with prescribing clinician: 9/29/2022      Next office visit with prescribing clinician: 3/21/2023     Loyda Jordan MA  12/06/22, 13:13 EST

## 2023-01-04 ENCOUNTER — DOCUMENTATION (OUTPATIENT)
Dept: ONCOLOGY | Facility: HOSPITAL | Age: 63
End: 2023-01-04
Payer: MEDICARE

## 2023-01-26 ENCOUNTER — SPECIALTY PHARMACY (OUTPATIENT)
Dept: ONCOLOGY | Facility: HOSPITAL | Age: 63
End: 2023-01-26
Payer: MEDICARE

## 2023-03-21 ENCOUNTER — OFFICE VISIT (OUTPATIENT)
Dept: NEUROLOGY | Facility: CLINIC | Age: 63
End: 2023-03-21
Payer: MEDICARE

## 2023-03-21 ENCOUNTER — LAB (OUTPATIENT)
Dept: LAB | Facility: HOSPITAL | Age: 63
End: 2023-03-21
Payer: MEDICARE

## 2023-03-21 VITALS
OXYGEN SATURATION: 96 % | WEIGHT: 205 LBS | SYSTOLIC BLOOD PRESSURE: 146 MMHG | DIASTOLIC BLOOD PRESSURE: 78 MMHG | HEART RATE: 73 BPM | HEIGHT: 67 IN | BODY MASS INDEX: 32.18 KG/M2

## 2023-03-21 DIAGNOSIS — G35 MULTIPLE SCLEROSIS: ICD-10-CM

## 2023-03-21 DIAGNOSIS — G35 MULTIPLE SCLEROSIS: Primary | Chronic | ICD-10-CM

## 2023-03-21 DIAGNOSIS — M79.2 NEUROPATHIC PAIN: Chronic | ICD-10-CM

## 2023-03-21 DIAGNOSIS — F33.41 RECURRENT MAJOR DEPRESSIVE DISORDER, IN PARTIAL REMISSION: Chronic | ICD-10-CM

## 2023-03-21 PROCEDURE — 3078F DIAST BP <80 MM HG: CPT | Performed by: PSYCHIATRY & NEUROLOGY

## 2023-03-21 PROCEDURE — 1160F RVW MEDS BY RX/DR IN RCRD: CPT | Performed by: PSYCHIATRY & NEUROLOGY

## 2023-03-21 PROCEDURE — 1159F MED LIST DOCD IN RCRD: CPT | Performed by: PSYCHIATRY & NEUROLOGY

## 2023-03-21 PROCEDURE — 3077F SYST BP >= 140 MM HG: CPT | Performed by: PSYCHIATRY & NEUROLOGY

## 2023-03-21 PROCEDURE — 99214 OFFICE O/P EST MOD 30 MIN: CPT | Performed by: PSYCHIATRY & NEUROLOGY

## 2023-03-21 RX ORDER — BUPROPION HYDROCHLORIDE 300 MG/1
300 TABLET ORAL DAILY
Qty: 90 TABLET | Refills: 3 | Status: SHIPPED | OUTPATIENT
Start: 2023-03-21 | End: 2024-03-20

## 2023-03-21 RX ORDER — GABAPENTIN 300 MG/1
CAPSULE ORAL
Qty: 720 CAPSULE | Refills: 0 | Status: SHIPPED | OUTPATIENT
Start: 2023-03-21

## 2023-03-21 RX ORDER — HYDROCHLOROTHIAZIDE 25 MG/1
1 TABLET ORAL DAILY
COMMUNITY
Start: 2023-02-16

## 2023-03-21 RX ORDER — BUPROPION HYDROCHLORIDE 150 MG/1
150 TABLET ORAL EVERY MORNING
Qty: 90 TABLET | Refills: 3 | Status: SHIPPED | OUTPATIENT
Start: 2023-03-21 | End: 2024-03-20

## 2023-03-21 NOTE — PROGRESS NOTES
"Chief Complaint    Multiple Sclerosis    Subjective        Christiane Figueroa presents to [facility] NEUROLOGY [facility]     History of Present Illness    62 y.o. female returns in follow up.  Last visit on 9/29/22 conitnued Aubagio, GBP, bupropion.    MRI Brain/cervical 9/23/21 as compared to 10/31/19 - no new/enlarging/enhancing lesions  right frontal, right centrum semiovale PVWM, scattered T2 lesions       9/29/2022:  CBC, CMP - NCS     RRMS     MSFC improved.      Tolerating Aubagio.       Previous DMD:  Rebif, Tecfidera. Gilenya(macular edema).     MDD      Mood is stable     Wellbutrin 450 mg daily improved mood.      OAB     Controlled off meds.       Neuropathic pain      Leg pains are controlled on  mg              Objective   Vital Signs:  /78   Pulse 73   Ht 170.2 cm (67.01\")   Wt 93 kg (205 lb)   SpO2 96%   BMI 32.10 kg/m²   Estimated body mass index is 32.1 kg/m² as calculated from the following:    Height as of this encounter: 170.2 cm (67.01\").    Weight as of this encounter: 93 kg (205 lb).             Physical Exam  Eyes:      Extraocular Movements: EOM normal.      Pupils: Pupils are equal, round, and reactive to light.   Neurological:      Mental Status: She is oriented to person, place, and time.      Cranial Nerves: Cranial nerves 2-12 are intact.      Motor: Motor strength is normal.      Gait: Gait is intact.   Psychiatric:         Speech: Speech normal.          Neurologic Exam     Mental Status   Oriented to person, place, and time.   Speech: speech is normal   Level of consciousness: alert  Knowledge: good and consistent with education.   Normal comprehension.     Cranial Nerves   Cranial nerves II through XII intact.     CN II   Visual fields full to confrontation.   Visual acuity: normal  Right visual field deficit: none  Left visual field deficit: none     CN III, IV, VI   Pupils are equal, round, and reactive to light.  Extraocular motions are normal. "   Nystagmus: none   Diplopia: none  Ophthalmoparesis: none  Upgaze: normal  Downgaze: normal  Conjugate gaze: present    CN V   Facial sensation intact.   Right corneal reflex: normal  Left corneal reflex: normal    CN VII   Right facial weakness: none  Left facial weakness: none    CN VIII   Hearing: intact    CN IX, X   Palate: symmetric  Right gag reflex: normal  Left gag reflex: normal    CN XI   Right sternocleidomastoid strength: normal  Left sternocleidomastoid strength: normal    CN XII   Tongue: not atrophic  Fasciculations: absent  Tongue deviation: none    Motor Exam   Muscle bulk: normal  Overall muscle tone: normal    Strength   Strength 5/5 throughout.     Sensory Exam   Light touch normal.     Gait, Coordination, and Reflexes     Gait  Gait: normal    Tremor   Resting tremor: absent  Intention tremor: absent  Action tremor: absent    Reflexes   Reflexes 2+ except as noted.          Result Review :  The following data was reviewed by: Yousuf Zelaya MD on 03/21/2023:  Common labs    Common Labs 3/28/22 3/28/22 9/29/22 9/29/22    1304 1304 1302 1302   Glucose 112 (A)  110 (A)    BUN 15  15    Creatinine 0.61  0.67    Sodium 142  140    Potassium 4.6  4.4    Chloride 99  98    Calcium 9.7  9.3    Total Protein 7.3  7.0    Albumin 4.80  4.80    Total Bilirubin 0.2  0.3    Alkaline Phosphatase 62  54    AST (SGOT) 15  20    ALT (SGPT) 16  16    WBC  5.22  4.11   Hemoglobin  14.2  13.5   Hematocrit  43.6  41.1   Platelets  189  195   (A) Abnormal value                         Assessment and Plan   Diagnoses and all orders for this visit:    1. Multiple sclerosis (HCC) (Primary)  Assessment & Plan:  Hold Aubagio due to HTN       Orders:  -     gabapentin (NEURONTIN) 300 MG capsule; Take two capsules am and pm, and four capsules at bedtime  Dispense: 720 capsule; Refill: 0  -     CBC & Differential; Future  -     Comprehensive Metabolic Panel; Future    2. Neuropathic pain  Assessment & Plan:  Continue GBP        3. Recurrent major depressive disorder, in partial remission ([facility])  Assessment & Plan:  Patient's depression is recurrent and is mild without psychosis. Their depression is currently in partial remission and the condition is improving with treatment. This will be reassessed at the next regular appointment. F/U as described:patient will continue current medication therapy.      Other orders  -     buPROPion XL (WELLBUTRIN XL) 150 MG 24 hr tablet; Take 1 tablet by mouth Every Morning. Take one tablet by mouth daily.  Dispense: 90 tablet; Refill: 3  -     buPROPion XL (WELLBUTRIN XL) 300 MG 24 hr tablet; Take 1 tablet by mouth Daily.  Dispense: 90 tablet; Refill: 3           Follow Up   No follow-ups on file.  Patient was given instructions and counseling regarding her condition or for health maintenance advice. Please see specific information pulled into the AVS if appropriate.

## 2023-04-05 ENCOUNTER — TELEPHONE (OUTPATIENT)
Dept: NEUROLOGY | Facility: CLINIC | Age: 63
End: 2023-04-05

## 2023-04-05 NOTE — TELEPHONE ENCOUNTER
Caller: Christiane Figueroa    Relationship: Self    Best call back number: [phone]    Who are you requesting to speak with (clinical staff, provider,  specific staff member):   DR ENGEL    What was the call regarding:   MS 1 TO 1 PROGRAM FOR AUBAGIO  A SCRIPT WITH REFILLS NEED TO BE SENT FOR OBE YEAR TO THIS FAX NUMBER: [phone].    THE PT HAS BEEN APPROVED. ALL THAT IS NEEDED NOW IS THE SCRIPT FROM DR ENGEL.

## 2023-04-17 ENCOUNTER — SPECIALTY PHARMACY (OUTPATIENT)
Dept: ONCOLOGY | Facility: HOSPITAL | Age: 63
End: 2023-04-17
Payer: MEDICARE

## 2023-04-27 ENCOUNTER — SPECIALTY PHARMACY (OUTPATIENT)
Dept: ONCOLOGY | Facility: HOSPITAL | Age: 63
End: 2023-04-27
Payer: MEDICARE

## 2023-04-27 RX ORDER — TERIFLUNOMIDE 14 MG/1
14 TABLET, FILM COATED ORAL DAILY
Qty: 90 TABLET | Refills: 3 | Status: SHIPPED | OUTPATIENT
Start: 2023-04-27

## 2023-04-27 NOTE — PROGRESS NOTES
Specialty Pharmacy Refill Coordination Note     Christiane is a 62 y.o. female contacted today regarding refills of Teriflunomide specialty medication(s).    Reviewed and verified with patient: Yes  Specialty medication(s) and dose(s) confirmed: yes    Refill Questions    Flowsheet Row Most Recent Value   Changes to allergies? No   Changes to medications? No   New conditions since last clinic visit No   Unplanned office visit, urgent care, ED, or hospital admission in the last 4 weeks  No   How does patient/caregiver feel medication is working? Very good   Financial problems or insurance changes  No   Since the previous refill, were any specialty medication doses or scheduled injections missed or delayed?  Yes   If yes, please provide the amount Patient hasn't been on therapy for about a month to monitor blood pressure. Provider said to restart. Transferred to [facility].   Why were doses missed? Paused   Does this patient require a clinical escalation to a pharmacist? No  [[facility] aware. New start to us.]          Delivery Questions    Flowsheet Row Most Recent Value   Delivery method FedEx   Delivery address correct? Yes   Preferred delivery time? Anytime   Number of medications in delivery 1   Medication being filled and delivered Teriflunomide   Doses left of specialty medications 2 from previous fill over a month ago   Is there any medication that is due not being filled? No   Supplies needed? No supplies needed   Cooler needed? No   Do any medications need mixed or dated? No   Copay form of payment Credit card on file   Questions or concerns for the pharmacist? No   Are any medications first time fills? Yes  [First fill with [facility]]                 Follow-up:  12 weeks     Cindi Kline, Pharmacy Technician  Specialty Pharmacy Technician

## 2023-04-27 NOTE — PROGRESS NOTES
Specialty Pharmacy Patient Management Program  Neurology Initial Assessment     Christiane Figueroa is a 62 y.o. female with multiple sclerosis seen by a Neurology provider and enrolled in the Neurology Patient Management program offered by [facility].  An initial outreach was conducted, including assessment of therapy appropriateness and specialty medication education for teriflunomide.  Patient has been receiving teriflunomide from an outside speciality pharmacy and is transferring her fills to [facility] retail pharmacy.  Last visit on 3/21/23 Aubagio was held to to patient experiencing high blood pressure.  Aubagio held for 5 weeks without any change to her blood pressure. Provider consulted, and orders to re-start Augabio were given.   The patient was introduced to services offered by [facility], including: regular assessments, refill coordination, curbside pick-up or mail order delivery options, prior authorization maintenance, and financial assistance programs as applicable. The patient was also provided with contact information for the pharmacy team.     Insurance Coverage & Financial Support  [employer]    Relevant Past Medical History and Comorbidities  Relevant medical history and concomitant health conditions were discussed with the patient. The patient's chart has been reviewed for relevant past medical history and comorbid health conditions and updated as necessary.   Past Medical History:   Diagnosis Date   • Anxiety    • Arthritis    • Depression    • Diabetes mellitus    • Fatigue    • Hypertension    • Limb swelling     BLE, left leg worse   • LOM (loss of memory)    • Multiple sclerosis    • Pain, joint, shoulder     Bilateral   • Vision problems      Social History     Socioeconomic History   • Marital status:    Tobacco Use   • Smoking status: Never     Passive exposure: Never   • Smokeless tobacco: Never   Vaping Use   • Vaping Use: Never used    Substance and Sexual Activity   • Alcohol use: No   • Drug use: No   • Sexual activity: Defer            Allergies  Known allergies and reactions were discussed with the patient. The patient's chart has been reviewed for  allergy information and updated as necessary.   Allergies   Allergen Reactions   • Adhesive Tape Rash   • Latex Hives              Current Medication List  This medication list has been reviewed with the patient and evaluated for any interactions or necessary modifications/recommendations, and updated to include all prescription medications, OTC medications, and supplements the patient is currently taking.  This list reflects what is contained in the patient's profile, which has also been marked as reviewed to communicate to other providers it is the most up to date version of the patient's current medication therapy.     Current Outpatient Medications:   •  Biotin 5 MG capsule, biotin  5,000 MCG 1 QD, Disp: , Rfl:   •  buPROPion XL (WELLBUTRIN XL) 150 MG 24 hr tablet, Take 1 tablet by mouth Every Morning. Take one tablet by mouth daily., Disp: 90 tablet, Rfl: 3  •  buPROPion XL (WELLBUTRIN XL) 300 MG 24 hr tablet, Take 1 tablet by mouth Daily., Disp: 90 tablet, Rfl: 3  •  Calcium-Magnesium-Zinc 333-133-5 MG tablet, Take  by mouth., Disp: , Rfl:   •  gabapentin (NEURONTIN) 300 MG capsule, Take two capsules am and pm, and four capsules at bedtime, Disp: 720 capsule, Rfl: 0  •  hydroCHLOROthiazide (HYDRODIURIL) 25 MG tablet, Take 1 tablet by mouth Daily., Disp: , Rfl:   •  losartan (COZAAR) 50 MG tablet, Take 1 tablet by mouth 2 (Two) Times a Day., Disp: , Rfl:   •  rizatriptan (MAXALT) 10 MG tablet, Take one tablet as needed for headache. May repeat in 2 hours if needed., Disp: , Rfl:   •  simvastatin (ZOCOR) 20 MG tablet, Take 1 tablet by mouth Daily., Disp: , Rfl:   •  Teriflunomide 14 MG tablet, Take 14 mg by mouth Daily., Disp: 90 tablet, Rfl: 3  •  vitamin D (ERGOCALCIFEROL) 1.25 MG (33479  UT) capsule capsule, Take 1 capsule by mouth Every 7 (Seven) Days., Disp: 12 capsule, Rfl: 3         Drug Interactions  none     Recommended Medications Assessment    None      Relevant Laboratory Values    Common labs        9/29/2022    13:02   Common Labs   Glucose 110     BUN 15     Creatinine 0.67     Sodium 140     Potassium 4.4     Chloride 98     Calcium 9.3     Total Protein 7.0     Albumin 4.80     Total Bilirubin 0.3     Alkaline Phosphatase 54     AST (SGOT) 20     ALT (SGPT) 16     WBC 4.11     Hemoglobin 13.5     Hematocrit 41.1     Platelets 195         Initial Education Provided for Specialty Medication  The patient has been provided with the following education and any applicable administration techniques (i.e. self-injection) have been demonstrated for the therapies indicated. All questions and concerns have been addressed prior to the patient receiving the medication, and the patient has verbalized understanding of the education and any materials provided.  Additional patient education shall be provided and documented upon request by the patient, provider or payer.        Aubagio (teriflunomide)    Medication Expectations   Why am I taking this medication? AUBAGIO (teriflunomide) is a prescription medicine used to treat relapsing forms of multiple sclerosis (MS), to include clinically isolated syndrome, relapsing-remitting disease, and active secondary progressive disease, in adults.   What should I expect while on this medication? A reduction in relapse rate, new or enlarging brain lesions on your MRI, and disability progression.   How does the medication work? AUBAGIO inhibits an enzyme (dihydroorotate dehydrogenase) that these destructive cells need to keep multiplying. Although the exact mechanism of action in multiple sclerosis is unknown, it may involve a reduction in activated lymphocytes in the CNS.   How long will I be on this medication for? The amount of time you will be on this  medication will be determined by your doctor and your response to the medication   How do I take this medication? Take as directed on your prescription label.   What are some possible side effects? The most common side effects when taking AUBAGIO include but not limited to: headache; diarrhea; nausea; hair thinning or loss; and abnormal liver test results. These are not all the side effects of AUBAGIO. Patient verbalized understanding.   What happens if I miss a dose? Take the medicine as soon as you can, but skip the missed dose if it is almost time for your next dose. Do not take two doses at one time.     Medication Safety   What are things I should warn my doctor immediately about? AUBAGIO may cause serious side effects. Tell your doctor if you have any of the following:    Decreases in white blood cell count -- this may cause you to have more infections. Symptoms include fever, tiredness, body aches, chills,  nausea, or vomiting. Patients with low white blood cell count should not receive certain vaccinations during AUBAGIO treatment and 6 months after.    Allergic reactions such as difficulty breathing, itching, or swelling on any part of your body including lips, eyes, throat, or tongue. Stop taking AUBAGIO and call your doctor right away.    Serious skin reactions that may lead to death. Stop taking AUBAGIO if you have rash or redness and peeling, mouth sores or blisters.    Other allergic reactions that may affect different parts of the body. If you have a fever or rash in combination with severe muscle pain, swollen lymph glands, swelling of your face, unusual bruising or bleeding, weakness or tiredness, or yellowing of your skin or the whites of your eyes, stop taking AUBAGIO and call your doctor right away.    Numbness or tingling in your hands or feet that is different from your MS symptoms    High blood pressure    Breathing problems (new or worsening) -- these may be serious and lead to death   What  are things that I should be cautious of? Aubagio is contraindicated in the following circumstances:   1) Concomitant use with leflunomide   2) History of hypersensitivity to teriflunomide, leflunomide, or any components of the product   3)  Pregnancy or potential for pregnancy without the use of reliable contraception  4) Severe hepatic impairment     AUBAGIO may stay in your blood for up to 2 years after you stop taking it. Your healthcare provider can prescribe a medicine that can remove AUBAGIO from your blood quickly.   What are some medications that can interact with this one? There are several drug interactions, check with your pharmacist or health care provider if you start any new medications. Concomitant use with leflunomide is contraindicated.     Medication Storage/Handling   How should I handle this medication? Use of single gloves by anyone handling intact tablets or capsules or administering from a unit-dose package is recommended.  In the preparation of tablets or capsules, including cutting, crushing, or manipulating, or the handling of uncoated tablets, use double gloves and a protective gown. Prepare in a ventilated control device, if possible. Use respiratory protection if not prepared in a control device. During administration, wear single gloves, and wear eye/face protection if the formulation is hard to swallow or if the patient may resist, vomit, or spit up    How does this medication need to be stored? Store tablets between 20 and 25 degrees C (68 and 77 degrees F)   How should I dispose of this medication? There should not be a need to dispose of this medication unless your provider decides to change the dose or therapy. If that is the case, take to your local police station for proper disposal. Some pharmacies also have take-back bins for medication drop-off.      Resources/Support   How can I remind myself to take this medication? You can download reminder apps to help you manage your  refills. You may also set an alarm on your phone to remind you. The pharmacy carries pill boxes that you can place next to an area you pass everyday (such as where you place your car keys or where you charge your phone)   Is financial support available?  The AUBAGIO Co-Pay Program offers assistance for those with commercial insurance. It assists with co-pay and co-insurance costs for AUBAGIO prescriptions, regardless of your financial status. Once enrolled in the program, you’ll have a $0 co-payment. Talk to an [facility] Nurse to learn more at [phone].   Which vaccines are recommended for me? Talk to your doctor about these vaccines: Flu, Coronavirus (COVID-19), Pneumococcal (pneumonia), Tdap, Hepatitis B, Zoster (shingles)           Adherence and Self-Administration  • Barriers to Patient Adherence and/or Self-Administration: none    • Methods for Supporting Patient Adherence and/or Self-Administration: not required     Goals of Therapy   Goals     • Specialty Pharmacy General Goal      Reduction in relapse rate and signs and symptoms of relapsed ms.             Reassessment Plan & Follow-Up  1. Medication Therapy Changes: Continue Aubagio 14 mg po once daily  2. Additional Plans, Therapy Recommendations, or Therapy Problems to Be Addressed: none  3. Pharmacist to perform regular reassessments no more than (6) months from the previous assessment.  4. Welcome information and patient satisfaction survey to be sent by retail team with patient's initial fill.  5. Care Coordinator to set up future refill outreaches, coordinate prescription delivery, and escalate clinical questions to pharmacist.     Attestation  I attest that the initiated specialty medication(s) are appropriate for the patient based on my assessment.  If the prescribed therapy is at any point deemed not appropriate based on the current or future assessments, a consultation will be initiated with the patient's specialty care provider to  determine the best course of action. The revised plan of therapy will be documented along with any additional patient education provided.     Melissa Carrasquillo, Cat  4/27/2023  13:17 EDT

## 2023-05-05 NOTE — ASSESSMENT & PLAN NOTE
After multiple attempts to connect via virtual visit, patient was unable to do so. I refilled her med for 1 month and asked my MA to call and ask her to reschedule for an iov. Sx improved on oxybutynin

## 2023-07-31 ENCOUNTER — SPECIALTY PHARMACY (OUTPATIENT)
Dept: ONCOLOGY | Facility: HOSPITAL | Age: 63
End: 2023-07-31
Payer: MEDICARE

## 2023-09-21 ENCOUNTER — LAB (OUTPATIENT)
Dept: LAB | Facility: HOSPITAL | Age: 63
End: 2023-09-21

## 2023-09-21 ENCOUNTER — OFFICE VISIT (OUTPATIENT)
Dept: NEUROLOGY | Facility: CLINIC | Age: 63
End: 2023-09-21
Payer: MEDICARE

## 2023-09-21 VITALS
HEIGHT: 67 IN | OXYGEN SATURATION: 97 % | SYSTOLIC BLOOD PRESSURE: 152 MMHG | DIASTOLIC BLOOD PRESSURE: 84 MMHG | WEIGHT: 217 LBS | BODY MASS INDEX: 34.06 KG/M2 | HEART RATE: 74 BPM

## 2023-09-21 DIAGNOSIS — G35 MULTIPLE SCLEROSIS: Primary | Chronic | ICD-10-CM

## 2023-09-21 DIAGNOSIS — M79.2 NEUROPATHIC PAIN: Chronic | ICD-10-CM

## 2023-09-21 DIAGNOSIS — F33.41 RECURRENT MAJOR DEPRESSIVE DISORDER, IN PARTIAL REMISSION: Chronic | ICD-10-CM

## 2023-09-21 DIAGNOSIS — G35 MULTIPLE SCLEROSIS: Chronic | ICD-10-CM

## 2023-09-21 PROCEDURE — 3079F DIAST BP 80-89 MM HG: CPT | Performed by: PSYCHIATRY & NEUROLOGY

## 2023-09-21 PROCEDURE — 99214 OFFICE O/P EST MOD 30 MIN: CPT | Performed by: PSYCHIATRY & NEUROLOGY

## 2023-09-21 PROCEDURE — 1159F MED LIST DOCD IN RCRD: CPT | Performed by: PSYCHIATRY & NEUROLOGY

## 2023-09-21 PROCEDURE — 1160F RVW MEDS BY RX/DR IN RCRD: CPT | Performed by: PSYCHIATRY & NEUROLOGY

## 2023-09-21 PROCEDURE — 3077F SYST BP >= 140 MM HG: CPT | Performed by: PSYCHIATRY & NEUROLOGY

## 2023-09-21 RX ORDER — GABAPENTIN 300 MG/1
CAPSULE ORAL
Qty: 720 CAPSULE | Refills: 1 | Status: SHIPPED | OUTPATIENT
Start: 2023-09-21

## 2023-09-21 RX ORDER — DIFLUPREDNATE OPHTHALMIC 0.5 MG/ML
EMULSION OPHTHALMIC
COMMUNITY
End: 2023-09-21

## 2023-09-21 RX ORDER — ESTRADIOL 0.1 MG/G
CREAM VAGINAL
COMMUNITY
Start: 2023-08-29

## 2023-09-21 RX ORDER — PHENAZOPYRIDINE HYDROCHLORIDE 95 MG/1
TABLET ORAL EVERY 8 HOURS SCHEDULED
COMMUNITY
End: 2023-09-21

## 2023-09-21 NOTE — PROGRESS NOTES
"Chief Complaint    Multiple Sclerosis    Subjective        Christiane Figueroa presents to [facility] NEUROLOGY    History of Present Illness    63 y.o. female returns in follow up.  Last visit on 3/21/23 held Aubagio, continued GBP,  bupropion.     MRI Brain/cervical 9/23/21 as compared to 10/31/19 - no new/enlarging/enhancing lesions  right frontal, right centrum semiovale PVWM, scattered T2 lesions       3/21/2022:  CBC, CMP - NCS     RRMS     MSFC improved.      Off Aubagio for two months and BP remained elevated.      Off Aubagio had increased fatigue.     Restarted sx improved.     Tolerating Aubagio.       Previous DMD:  Rebif, Tecfidera. Gilenya(macular edema).     MDD      Mood is stable     Wellbutrin 450 mg daily improved mood.      OAB     Controlled off meds.       Neuropathic pain      Leg pains are controlled on  mg            Objective   Vital Signs:  /84   Pulse 74   Ht 170.2 cm (67.01\")   Wt 98.4 kg (217 lb)   SpO2 97%   BMI 33.98 kg/m²   Estimated body mass index is 33.98 kg/m² as calculated from the following:    Height as of this encounter: 170.2 cm (67.01\").    Weight as of this encounter: 98.4 kg (217 lb).           Neurologic Exam     Mental Status   Oriented to person, place, and time.   Speech: speech is normal   Level of consciousness: alert  Knowledge: good and consistent with education.   Normal comprehension.     Cranial Nerves   Cranial nerves II through XII intact.     CN II   Visual fields full to confrontation.   Visual acuity: normal  Right visual field deficit: none  Left visual field deficit: none     CN III, IV, VI   Pupils are equal, round, and reactive to light.  Extraocular motions are normal.   Nystagmus: none   Diplopia: none  Ophthalmoparesis: none  Upgaze: normal  Downgaze: normal  Conjugate gaze: present    CN V   Facial sensation intact.   Right corneal reflex: normal  Left corneal reflex: normal    CN VII   Right facial weakness: " none  Left facial weakness: none    CN VIII   Hearing: intact    CN IX, X   Palate: symmetric  Right gag reflex: normal  Left gag reflex: normal    CN XI   Right sternocleidomastoid strength: normal  Left sternocleidomastoid strength: normal    CN XII   Tongue: not atrophic  Fasciculations: absent  Tongue deviation: none    Motor Exam   Muscle bulk: normal  Overall muscle tone: normal    Strength   Strength 5/5 throughout.     Sensory Exam   Light touch normal.     Gait, Coordination, and Reflexes     Gait  Gait: normal    Tremor   Resting tremor: absent  Intention tremor: absent  Action tremor: absent    Reflexes   Reflexes 2+ except as noted.      Physical Exam  Eyes:      Extraocular Movements: EOM normal.      Pupils: Pupils are equal, round, and reactive to light.   Neurological:      Mental Status: She is oriented to person, place, and time.      Cranial Nerves: Cranial nerves 2-12 are intact.      Motor: Motor strength is normal.      Gait: Gait is intact.   Psychiatric:         Speech: Speech normal.      Result Review :                   Assessment and Plan   Diagnoses and all orders for this visit:    1. Multiple sclerosis (Primary)  Assessment & Plan:  Stable on Aubagio     CBC, CMP      Orders:  -     CBC & Differential; Future  -     Comprehensive Metabolic Panel; Future    2. Neuropathic pain  Assessment & Plan:  Stable on GBP       3. Recurrent major depressive disorder, in partial remission  Assessment & Plan:  Patient's depression is recurrent and is mild without psychosis. Their depression is currently in partial remission and the condition is improving with treatment. This will be reassessed at the next regular appointment. F/U as described:patient will continue current medication therapy.               Follow Up   No follow-ups on file.  Patient was given instructions and counseling regarding her condition or for health maintenance advice. Please see specific information pulled into the AVS if  appropriate.

## 2023-09-22 LAB
ALBUMIN SERPL-MCNC: 4.8 G/DL (ref 3.5–5.2)
ALBUMIN/GLOB SERPL: 1.8 G/DL
ALP SERPL-CCNC: 51 U/L (ref 39–117)
ALT SERPL-CCNC: 20 U/L (ref 1–33)
AST SERPL-CCNC: 22 U/L (ref 1–32)
BASOPHILS # BLD AUTO: 0.07 10*3/MM3 (ref 0–0.2)
BASOPHILS NFR BLD AUTO: 1.5 % (ref 0–1.5)
BILIRUB SERPL-MCNC: <0.2 MG/DL (ref 0–1.2)
BUN SERPL-MCNC: 17 MG/DL (ref 8–23)
BUN/CREAT SERPL: 25 (ref 7–25)
CALCIUM SERPL-MCNC: 10.1 MG/DL (ref 8.6–10.5)
CHLORIDE SERPL-SCNC: 102 MMOL/L (ref 98–107)
CO2 SERPL-SCNC: 26.3 MMOL/L (ref 22–29)
CREAT SERPL-MCNC: 0.68 MG/DL (ref 0.57–1)
EGFRCR SERPLBLD CKD-EPI 2021: 97.4 ML/MIN/1.73
EOSINOPHIL # BLD AUTO: 0.28 10*3/MM3 (ref 0–0.4)
EOSINOPHIL NFR BLD AUTO: 5.9 % (ref 0.3–6.2)
ERYTHROCYTE [DISTWIDTH] IN BLOOD BY AUTOMATED COUNT: 12.6 % (ref 12.3–15.4)
GLOBULIN SER CALC-MCNC: 2.7 GM/DL
GLUCOSE SERPL-MCNC: 104 MG/DL (ref 65–99)
HCT VFR BLD AUTO: 42.4 % (ref 34–46.6)
HGB BLD-MCNC: 14.1 G/DL (ref 12–15.9)
IMM GRANULOCYTES # BLD AUTO: 0.01 10*3/MM3 (ref 0–0.05)
IMM GRANULOCYTES NFR BLD AUTO: 0.2 % (ref 0–0.5)
LYMPHOCYTES # BLD AUTO: 1.34 10*3/MM3 (ref 0.7–3.1)
LYMPHOCYTES NFR BLD AUTO: 28.3 % (ref 19.6–45.3)
MCH RBC QN AUTO: 30.5 PG (ref 26.6–33)
MCHC RBC AUTO-ENTMCNC: 33.3 G/DL (ref 31.5–35.7)
MCV RBC AUTO: 91.6 FL (ref 79–97)
MONOCYTES # BLD AUTO: 0.52 10*3/MM3 (ref 0.1–0.9)
MONOCYTES NFR BLD AUTO: 11 % (ref 5–12)
NEUTROPHILS # BLD AUTO: 2.52 10*3/MM3 (ref 1.7–7)
NEUTROPHILS NFR BLD AUTO: 53.1 % (ref 42.7–76)
NRBC BLD AUTO-RTO: 0 /100 WBC (ref 0–0.2)
PLATELET # BLD AUTO: 192 10*3/MM3 (ref 140–450)
POTASSIUM SERPL-SCNC: 4.9 MMOL/L (ref 3.5–5.2)
PROT SERPL-MCNC: 7.5 G/DL (ref 6–8.5)
RBC # BLD AUTO: 4.63 10*6/MM3 (ref 3.77–5.28)
SODIUM SERPL-SCNC: 141 MMOL/L (ref 136–145)
WBC # BLD AUTO: 4.74 10*3/MM3 (ref 3.4–10.8)

## 2023-10-17 ENCOUNTER — SPECIALTY PHARMACY (OUTPATIENT)
Dept: ONCOLOGY | Facility: HOSPITAL | Age: 63
End: 2023-10-17
Payer: MEDICARE

## 2023-10-17 NOTE — PROGRESS NOTES
Specialty Pharmacy Patient Management Program  Neurology Reassessment     Christiane Figueroa is a 63 y.o. female with multiple sclerosis seen by a Neurology provider and enrolled in the Neurology Patient Management program offered by [facility].  A follow-up outreach was conducted, including assessment of continued therapy appropriateness, medication adherence, and side effect incidence and management for teriflunomide.     Changes to Insurance Coverage or Financial Support  [employer] Medicare     Relevant Past Medical History and Comorbidities  Relevant medical history and concomitant health conditions were discussed with the patient. The patient's chart has been reviewed for relevant past medical history and comorbid health conditions and updated as necessary.   Past Medical History:   Diagnosis Date    Anxiety     Arthritis     Depression     Diabetes mellitus     Fatigue     Hypertension     Limb swelling     BLE, left leg worse    LOM (loss of memory)     Multiple sclerosis     Pain, joint, shoulder     Bilateral    Vision problems      Social History     Socioeconomic History    Marital status:    Tobacco Use    Smoking status: Never     Passive exposure: Never    Smokeless tobacco: Never   Vaping Use    Vaping Use: Never used   Substance and Sexual Activity    Alcohol use: No    Drug use: No    Sexual activity: Defer          Hospitalizations and Urgent Care Since Last Assessment  ED Visits, Admissions, or Hospitalizations: none   Urgent Office Visits: 6/23 - fell and broke her hand     Allergies  Known allergies and reactions were discussed with the patient. The patient's chart has been reviewed for allergy information and updated as necessary.   Allergies   Allergen Reactions    Adhesive Tape Rash    Latex Hives     Allergies reviewed by Melissa Carrasquillo, PharmD on 10/17/2023 at 10:29 AM    Relevant Laboratory Values  Common labs          9/21/2023    11:43   Common Labs    Glucose 104    BUN 17    Creatinine 0.68    Sodium 141    Potassium 4.9    Chloride 102    Calcium 10.1    Total Protein 7.5    Albumin 4.8    Total Bilirubin <0.2    Alkaline Phosphatase 51    AST (SGOT) 22    ALT (SGPT) 20    WBC 4.74    Hemoglobin 14.1    Hematocrit 42.4    Platelets 192        Lab Assessment  The above labs have been reviewed. No dose adjustments are needed for the specialty medication(s) based on the labs.     Current Medication List  This medication list has been reviewed with the patient and evaluated for any interactions or necessary modifications/recommendations, and updated to include all prescription medications, OTC medications, and supplements the patient is currently taking.  This list reflects what is contained in the patient's profile, which has also been marked as reviewed to communicate to other providers it is the most up to date version of the patient's current medication therapy.     Current Outpatient Medications:     Biotin 5 MG capsule, biotin  5,000 MCG 1 QD, Disp: , Rfl:     buPROPion XL (WELLBUTRIN XL) 150 MG 24 hr tablet, Take 1 tablet by mouth Every Morning. Take one tablet by mouth daily., Disp: 90 tablet, Rfl: 3    buPROPion XL (WELLBUTRIN XL) 300 MG 24 hr tablet, Take 1 tablet by mouth Daily., Disp: 90 tablet, Rfl: 3    estradiol (ESTRACE) 0.1 MG/GM vaginal cream, insert 1 gram VAGINALLY AT BEDTIME TWICE A WEEK, Disp: , Rfl:     gabapentin (NEURONTIN) 300 MG capsule, Take two capsules am and pm, and four capsules at bedtime, Disp: 720 capsule, Rfl: 1    hydroCHLOROthiazide (HYDRODIURIL) 25 MG tablet, Take 1 tablet by mouth Daily., Disp: , Rfl:     losartan (COZAAR) 50 MG tablet, Take 1 tablet by mouth 2 (Two) Times a Day., Disp: , Rfl:     rizatriptan (MAXALT) 10 MG tablet, Take one tablet as needed for headache. May repeat in 2 hours if needed., Disp: , Rfl:     simvastatin (ZOCOR) 20 MG tablet, Take 1 tablet by mouth Daily., Disp: , Rfl:     Teriflunomide 14 MG  tablet, Take 1 tablet by mouth Daily., Disp: 90 tablet, Rfl: 3    vitamin D (ERGOCALCIFEROL) 1.25 MG (85849 UT) capsule capsule, Take 1 capsule by mouth Every 7 (Seven) Days., Disp: 12 capsule, Rfl: 3    Medicines reviewed by Melissa Carrasquillo, PharmD on 10/17/2023 at 10:27 AM    Drug Interactions  none     Adverse Drug Reactions  Medication tolerability: Tolerating with no to minimal ADRs          Medication plan: Continue therapy with normal follow-up  Plan for ADR Management: not required      Adherence, Self-Administration, and Current Therapy Problems  Adherence related patient's specialty therapy was discussed with the patient. The Adherence segment of this outreach has been reviewed and updated.   Adherence Questions  Linked Medication(s) Assessed: Teriflunomide  On average, how many doses/injections does the patient miss per month?: 0  What are the identified reasons for non-adherence or missed doses? : no problems identfied  What is the estimated medication adherence level?: %  Based on the patient/caregiver response and refill history, does this patient require an MTP to track adherence improvements?: no    Additional Barriers to Patient Self-Administration: none  Methods for Supporting Patient Self-Administration: not required    Recently Close Medication Therapy Problems  No medication therapy recommendations to display  Open Medication Therapy Problems  No medication therapy recommendations to display     Goals of Therapy  Goals related to the patient's specialty therapy was discussed with the patient. The Patient Goals segment of this outreach has been reviewed and updated.    Goals Addressed Today        Specialty Pharmacy General Goal      Reduction in relapse rate and signs and symptoms of relapsed ms.               Quality of Life Assessment   Quality of Life related to the patient's enrollment in the patient management program and services provided was discussed with the patient. The QOL  segment of this outreach has been reviewed and updated.   Quality of Life Improvement Scale: Moderately better    Reassessment Plan & Follow-Up  Medication Therapy Changes: continue Terifluonmide 14 mg po once daily   Related Plans, Therapy Recommendations, or Issues to Be Addressed: none  Pharmacist to perform regular reassessments no more than (6) months from the previous assessment.  Care Coordinator to set up future refill outreaches, coordinate prescription delivery, and escalate clinical questions to pharmacist.     Attestation  Therapeutic appropriateness: Appropriate  I attest the patient was actively involved in and has agreed to the above plan of care. If the prescribed therapy is at any point deemed not appropriate based on the current or future assessments, a consultation will be initiated with the patient's specialty care provider to determine the best course of action. The revised plan of therapy will be documented along with any additional patient education provided. Discussed aforementioned material with patient via telemedicine.    Melissa Carrasquillo, PharmD, [facility]  Clinic Specialty Pharmacist, Neurology  10/17/2023  10:30 EDT

## 2023-10-18 NOTE — PROGRESS NOTES
Specialty Pharmacy Refill Coordination Note     Christiane is a 63 y.o. female contacted today regarding refills of  teriflunomide specialty medication(s).    Reviewed and verified with patient:  Allergies  Meds  Problems       Specialty medication(s) and dose(s) confirmed: yes    Refill Questions      Flowsheet Row Most Recent Value   Changes to allergies? No   Changes to medications? No   New conditions since last clinic visit No   Unplanned office visit, urgent care, ED, or hospital admission in the last 4 weeks  No   How does patient/caregiver feel medication is working? Very good   Financial problems or insurance changes  No   Since the previous refill, were any specialty medication doses or scheduled injections missed or delayed?  No   If yes, please provide the amount N/A   Why were doses missed? N/A   Does this patient require a clinical escalation to a pharmacist? No            Delivery Questions      Flowsheet Row Most Recent Value   Delivery method FedEx   Delivery address correct? Yes   Preferred delivery time? Anytime   Number of medications in delivery 1   Medication(s) being filled and delivered Teriflunomide   Doses left of specialty medications About a 2 week supply   Is there any medication that is due not being filled? No   Supplies needed? No supplies needed   Cooler needed? No   Do any medications need mixed or dated? No   Copay form of payment Credit card on file   Additional comments Teriflunomide co-pay $20 verified with pt   Questions or concerns for the pharmacist? No   Explain any questions or concerns for the pharmacist n/a   Are any medications first time fills? No   Tracking number for delivery n/a                   Follow-up: 25 day(s)     Melissa Carrasquillo, JhD

## 2023-12-19 RX ORDER — ERGOCALCIFEROL 1.25 MG/1
50000 CAPSULE ORAL
Qty: 12 CAPSULE | Refills: 3 | Status: SHIPPED | OUTPATIENT
Start: 2023-12-19

## 2023-12-19 NOTE — TELEPHONE ENCOUNTER
Rx Refill Note  Requested Prescriptions     Pending Prescriptions Disp Refills    vitamin D (ERGOCALCIFEROL) 1.25 MG (25987 UT) capsule capsule 12 capsule 3     Sig: Take 1 capsule by mouth Every 7 (Seven) Days.      Last filled: 12/06/2022 w/3  Last office visit with prescribing clinician: 9/21/2023      Next office visit with prescribing clinician: 3/21/2024     Jeane Leavitt MA  12/19/23, 08:02 EST

## 2023-12-28 ENCOUNTER — TELEPHONE (OUTPATIENT)
Dept: NEUROLOGY | Facility: CLINIC | Age: 63
End: 2023-12-28
Payer: MEDICARE

## 2023-12-28 NOTE — TELEPHONE ENCOUNTER
Key: [id number]   Vitamin D (Ergocalciferol) 1.25 MG(27811 UT) capsules    Deniedtoday  The Medicare rule in the Prescription Drug Manual (Chapter 6, Section 20.1) says prescription vitamins and mineral products, except prenatal vitamins for members who are pregnant and fluoride preparations, are excluded from Medicare Part D coverage. Your drug is a prescription vitamin and/or mineral. Per Medicare rules, it is not covered.    Spoke to patient to inform that the PA was denied and that I spoke to her pharmacy and the rx will be $11.92 to  without using her insurance.  She expressed understanding and appreciation.

## 2023-12-28 NOTE — TELEPHONE ENCOUNTER
Caller: Christiane Figueroa    Relationship: Self    Best call back number: [phone]    What was the call regarding: PT CALLING TO NOTIFY THE OFFICE THAT HER INSURANCE IS REQUIRING A PRIOR AUTHORIZATION FOR FURTHER CONSIDERATION OF COVERAGE OF THE VITAMIN D 50,000 IU MEDICATION. PT CALLING TO REQUEST THAT OFFICE INITIATE PRIOR AUTHORIZATION AND CONTACT HER WITH ANY STATUS UPDATES.    Do you require a callback: YES, PLEASE.    PLEASE REVIEW AND ADVISE.

## 2024-01-17 ENCOUNTER — SPECIALTY PHARMACY (OUTPATIENT)
Dept: ONCOLOGY | Facility: HOSPITAL | Age: 64
End: 2024-01-17
Payer: MEDICARE

## 2024-01-17 NOTE — PROGRESS NOTES
Specialty Pharmacy Refill Coordination Note     Christiane is a 63 y.o. female contacted today regarding refills of  Teriflunomide specialty medication(s).    Reviewed and verified with patient:       Specialty medication(s) and dose(s) confirmed: yes    Refill Questions      Flowsheet Row Most Recent Value   Changes to allergies? No   Changes to medications? No   New conditions or infections since last clinic visit No   Unplanned office visit, urgent care, ED, or hospital admission in the last 4 weeks  No   How does patient/caregiver feel medication is working? Very good   Financial problems or insurance changes  No   Since the previous refill, were any specialty medication doses or scheduled injections missed or delayed?  No   If yes, please provide the amount N/A   Why were doses missed? N/A   Does this patient require a clinical escalation to a pharmacist? No            Delivery Questions      Flowsheet Row Most Recent Value   Delivery method FedEx   Delivery address verified with patient/caregiver? Yes   Delivery address Home   Number of medications in delivery 1   Medication(s) being filled and delivered Teriflunomide   Doses left of specialty medications 1-2 weeks left   Copay verified? Yes   Copay amount Teriflunomide=$20.00   Copay form of payment Credit/debit on file                   Follow-up: 3 month(s)     Colleen Larose, Pharmacy Technician  Specialty Pharmacy Technician

## 2024-04-01 ENCOUNTER — SPECIALTY PHARMACY (OUTPATIENT)
Dept: ONCOLOGY | Facility: HOSPITAL | Age: 64
End: 2024-04-01
Payer: MEDICARE

## 2024-04-01 ENCOUNTER — SPECIALTY PHARMACY (OUTPATIENT)
Dept: PHARMACY | Facility: TELEHEALTH | Age: 64
End: 2024-04-01
Payer: MEDICARE

## 2024-04-01 RX ORDER — TERIFLUNOMIDE 14 MG/1
14 TABLET, FILM COATED ORAL DAILY
Qty: 90 TABLET | Refills: 3 | Status: SHIPPED | OUTPATIENT
Start: 2024-04-01

## 2024-04-01 NOTE — PROGRESS NOTES
Specialty Pharmacy Patient Management Program  Reassessment     Christiane Figueroa is a 63 y.o. female with Multiple Sclerosis and enrolled in the Patient Management program offered by [facility]. A follow-up outreach was conducted, including assessment of continued therapy appropriateness, medication adherence, and side effect incidence and management for Teriflunomide 14mg.     Changes to Insurance Coverage or Financial Support  none    Relevant Past Medical History and Comorbidities  Relevant medical history and concomitant health conditions were discussed with the patient. The patient's chart has been reviewed for relevant past medical history and comorbid health conditions and updated as necessary.   Past Medical History:   Diagnosis Date    Anxiety     Arthritis     Depression     Diabetes mellitus     Fatigue     Hypertension     Limb swelling     BLE, left leg worse    LOM (loss of memory)     Multiple sclerosis     Pain, joint, shoulder     Bilateral    Vision problems      Social History     Socioeconomic History    Marital status:    Tobacco Use    Smoking status: Never     Passive exposure: Never    Smokeless tobacco: Never   Vaping Use    Vaping status: Never Used   Substance and Sexual Activity    Alcohol use: No    Drug use: No    Sexual activity: Defer     Problem list reviewed by Jeovanny Watson RPH on 4/1/2024 at 10:44 AM    Allergies  Known allergies and reactions were discussed with the patient. The patient's chart has been reviewed for allergy information and updated as necessary.   Allergies   Allergen Reactions    Adhesive Tape Rash    Latex Hives     Allergies reviewed by Jeovanny Watson RPH on 4/1/2024 at 10:43 AM    Relevant Laboratory Values  Lab Results   Component Value Date    GLUCOSE 104 (H) 09/21/2023    CALCIUM 10.1 09/21/2023     09/21/2023    K 4.9 09/21/2023    CO2 26.3 09/21/2023     09/21/2023    BUN 17 09/21/2023    CREATININE 0.68  09/21/2023    EGFRRESULT 97.4 09/21/2023    BCR 25.0 09/21/2023    ANIONGAP 9.0 03/06/2018     Lab Results   Component Value Date    WBC 4.74 09/21/2023    HGB 14.1 09/21/2023    HCT 42.4 09/21/2023    MCV 91.6 09/21/2023     09/21/2023     Lab Value Review  The above lab values have been reviewed; the following specialty medication(s) dose adjustment(s) are recommended: none.    Current Medication List  This medication list has been reviewed with the patient and evaluated for any interactions or necessary modifications/recommendations, and updated to include all prescription medications, OTC medications, and supplements the patient is currently taking. This list reflects what is contained in the patient's profile, which has also been marked as reviewed to communicate to other providers it is the most up to date version of the patient's current medication therapy.     Current Outpatient Medications:     Biotin 5 MG capsule, biotin  5,000 MCG 1 QD, Disp: , Rfl:     buPROPion XL (WELLBUTRIN XL) 150 MG 24 hr tablet, Take 1 tablet by mouth Every Morning. Take one tablet by mouth daily., Disp: 90 tablet, Rfl: 3    buPROPion XL (WELLBUTRIN XL) 300 MG 24 hr tablet, Take 1 tablet by mouth Daily., Disp: 90 tablet, Rfl: 3    estradiol (ESTRACE) 0.1 MG/GM vaginal cream, insert 1 gram VAGINALLY AT BEDTIME TWICE A WEEK, Disp: , Rfl:     gabapentin (NEURONTIN) 300 MG capsule, Take two capsules am and pm, and four capsules at bedtime, Disp: 720 capsule, Rfl: 1    hydroCHLOROthiazide (HYDRODIURIL) 25 MG tablet, Take 1 tablet by mouth Daily., Disp: , Rfl:     losartan (COZAAR) 50 MG tablet, Take 1 tablet by mouth 2 (Two) Times a Day., Disp: , Rfl:     rizatriptan (MAXALT) 10 MG tablet, Take one tablet as needed for headache. May repeat in 2 hours if needed., Disp: , Rfl:     simvastatin (ZOCOR) 20 MG tablet, Take 1 tablet by mouth Daily., Disp: , Rfl:     Teriflunomide 14 MG tablet, Take 1 tablet by mouth Daily., Disp: 90  tablet, Rfl: 3    vitamin D (ERGOCALCIFEROL) 1.25 MG (68053 UT) capsule capsule, Take 1 capsule by mouth Every 7 (Seven) Days., Disp: 12 capsule, Rfl: 3  Medicines reviewed by Jeovanny Watson RPH on 4/1/2024 at 10:44 AM    Drug Interactions  none     Adverse Drug Reactions  Medication tolerability: Tolerating with no to minimal ADRs  Medication plan: Continue therapy with normal follow-up  Plan for ADR Management: None    Hospitalizations and Urgent Care Since Last Assessment  Hospitalizations or Admissions: none  ED Visits: none  Urgent Office Visits: none     Adherence, Self-Administration, and Current Therapy Problems  Adherence related to the patient's specialty therapy was discussed with the patient. The Adherence segment of this outreach has been reviewed and updated.     Adherence Questions  Linked Medication(s) Assessed: Teriflunomide  On average, how many doses/injections does the patient miss per month?: 0  What are the identified reasons for non-adherence or missed doses? : no problems identified  What is the estimated medication adherence level?: %  Based on the patient/caregiver response and refill history, does this patient require an MTP to track adherence improvements?: no    Additional Barriers to Patient Self-Administration: None  Methods for Supporting Patient Self-Administration: None    Open Medication Therapy Problems  No medication therapy recommendations to display    Goals of Therapy  Goals related to the patient's specialty therapy were discussed with the patient. The Patient Goals segment of this outreach has been reviewed and updated.   Goals Addressed Today        Specialty Pharmacy General Goal      Reduction in relapse rate and signs and symptoms of relapsed ms.              Progress Toward Meeting Patient-Identified Goals of Therapy: On Track  New Patient-Identified Goals, If Applicable:     Progress Toward Meeting Clinical Goals or Therapeutic Targets: On Track  New Clinical  Goals or Therapeutic Targets, If Applicable:     Quality of Life Assessment   Quality of Life related to the patient's enrollment in the patient management program and services provided was discussed with the patient. The QOL segment of this outreach has been reviewed and updated.  Quality of Life Improvement Scale: 7-Somewhat better    Reassessment Plan & Follow-Up  Medication Therapy Changes: none  Additional Plans, Therapy Recommendations, or Therapy Problems to Be Addressed: none   Pharmacist to perform regular reassessments no more than (6) months from the previous assessment.  Care Coordinator to set up future refill outreaches, coordinate prescription delivery, and escalate clinical questions to pharmacist.     Attestation  I attest the patient was actively involved in and has agreed to the above plan of care. I attest that the specialty medication(s) addressed above are appropriate for the patient based on my reassessment. If the prescribed therapy is at any point deemed not appropriate based on the current or future assessments, a consultation will be initiated with the patient's specialty care provider to determine the best course of action. The revised plan of therapy will be documented along with any required assessments and/or additional patient education provided.     Electronically signed by Jeovanny Watson RPH, 04/01/24, 10:45 AM EDT.

## 2024-04-01 NOTE — PROGRESS NOTES
Specialty Pharmacy Patient Management Program  Call Center Refill Outreach      Christiane is a 63 y.o. female contacted today regarding refills of her medication(s).    Specialty medication(s) and dose(s) confirmed: Teriflunomide   Other medications being refilled: None    Refill Questions      Flowsheet Row Most Recent Value   Changes to allergies? No   Changes to medications? No   New conditions or infections since last clinic visit No   Unplanned office visit, urgent care, ED, or hospital admission in the last 4 weeks  No   How does patient/caregiver feel medication is working? Very good   Financial problems or insurance changes  No   Since the previous refill, were any specialty medication doses or scheduled injections missed or delayed?  No   If yes, please provide the amount None   Why were doses missed? N/A   Does this patient require a clinical escalation to a pharmacist? No            Delivery Questions      Flowsheet Row Most Recent Value   Delivery method FedEx   Delivery address verified with patient/caregiver? Yes   Delivery address Home   Number of medications in delivery 1   Medication(s) being filled and delivered Teriflunomide   Doses left of specialty medications Few   Copay verified? Yes   Copay amount $20.00   Copay form of payment Credit/debit on file                   Follow-up: 3 weeks     Jeovanny Watson [facility]  Specialty Pharmacist  4/1/2024  10:46 EDT

## 2024-06-10 DIAGNOSIS — G35 MULTIPLE SCLEROSIS: Chronic | ICD-10-CM

## 2024-06-10 RX ORDER — GABAPENTIN 300 MG/1
CAPSULE ORAL
Qty: 720 CAPSULE | Refills: 1 | Status: SHIPPED | OUTPATIENT
Start: 2024-06-10

## 2024-06-10 RX ORDER — BUPROPION HYDROCHLORIDE 150 MG/1
150 TABLET ORAL EVERY MORNING
Qty: 90 TABLET | Refills: 3 | Status: SHIPPED | OUTPATIENT
Start: 2024-06-10 | End: 2025-06-10

## 2024-06-10 RX ORDER — BUPROPION HYDROCHLORIDE 300 MG/1
300 TABLET ORAL DAILY
Qty: 90 TABLET | Refills: 3 | Status: SHIPPED | OUTPATIENT
Start: 2024-06-10 | End: 2025-06-10

## 2024-06-10 NOTE — TELEPHONE ENCOUNTER
Rx Refill Note  Requested Prescriptions     Pending Prescriptions Disp Refills    buPROPion XL (WELLBUTRIN XL) 150 MG 24 hr tablet 90 tablet 3     Sig: Take 1 tablet by mouth Every Morning. Take one tablet by mouth daily.    buPROPion XL (WELLBUTRIN XL) 300 MG 24 hr tablet 90 tablet 3     Sig: Take 1 tablet by mouth Daily.    gabapentin (NEURONTIN) 300 MG capsule 720 capsule 1     Sig: Take two capsules am and pm, and four capsules at bedtime      Last filled:  03/21/2023 w/3 ; 09/21/2023 w/1  Last office visit with prescribing clinician: 9/21/2023      Next office visit with prescribing clinician: 9/13/2024     Jeane Leavitt MA  06/10/24, 10:13 EDT

## 2024-06-12 ENCOUNTER — SPECIALTY PHARMACY (OUTPATIENT)
Dept: ONCOLOGY | Facility: HOSPITAL | Age: 64
End: 2024-06-12
Payer: MEDICARE

## 2024-06-12 DIAGNOSIS — G35 MULTIPLE SCLEROSIS: Primary | Chronic | ICD-10-CM

## 2024-06-28 ENCOUNTER — SPECIALTY PHARMACY (OUTPATIENT)
Dept: ONCOLOGY | Facility: HOSPITAL | Age: 64
End: 2024-06-28
Payer: MEDICARE

## 2024-06-28 NOTE — PROGRESS NOTES
Specialty Pharmacy Refill Coordination Note     Christiane is a 64 y.o. female contacted today regarding refills of  Teriflunomide specialty medication(s).    Reviewed and verified with patient:       Specialty medication(s) and dose(s) confirmed: yes    Refill Questions      Flowsheet Row Most Recent Value   Changes to allergies? No   Changes to medications? No   New conditions or infections since last clinic visit No   Unplanned office visit, urgent care, ED, or hospital admission in the last 4 weeks  No   How does patient/caregiver feel medication is working? Very good   Financial problems or insurance changes  No   Since the previous refill, were any specialty medication doses or scheduled injections missed or delayed?  No   If yes, please provide the amount None   Why were doses missed? N/A   Does this patient require a clinical escalation to a pharmacist? No            Delivery Questions      Flowsheet Row Most Recent Value   Delivery method FedEx   Delivery address verified with patient/caregiver? Yes   Delivery address Home   Number of medications in delivery 1   Medication(s) being filled and delivered Teriflunomide   Doses left of specialty medications At least a week   Copay verified? Yes   Copay amount $20.00   Copay form of payment Credit/debit on file                   Follow-up: 3 month(s)     Colleen Larose, Pharmacy Technician  Specialty Pharmacy Technician

## 2024-09-13 ENCOUNTER — SPECIALTY PHARMACY (OUTPATIENT)
Dept: ONCOLOGY | Facility: HOSPITAL | Age: 64
End: 2024-09-13
Payer: MEDICARE

## 2024-09-13 ENCOUNTER — PATIENT ROUNDING (BHMG ONLY) (OUTPATIENT)
Dept: NEUROLOGY | Facility: CLINIC | Age: 64
End: 2024-09-13
Payer: MEDICARE

## 2024-09-13 ENCOUNTER — OFFICE VISIT (OUTPATIENT)
Dept: NEUROLOGY | Facility: CLINIC | Age: 64
End: 2024-09-13
Payer: MEDICARE

## 2024-09-13 VITALS
DIASTOLIC BLOOD PRESSURE: 78 MMHG | HEART RATE: 68 BPM | WEIGHT: 213 LBS | BODY MASS INDEX: 33.43 KG/M2 | OXYGEN SATURATION: 99 % | SYSTOLIC BLOOD PRESSURE: 142 MMHG | HEIGHT: 67 IN

## 2024-09-13 DIAGNOSIS — F33.41 RECURRENT MAJOR DEPRESSIVE DISORDER, IN PARTIAL REMISSION: Chronic | ICD-10-CM

## 2024-09-13 DIAGNOSIS — G35 MULTIPLE SCLEROSIS: Primary | Chronic | ICD-10-CM

## 2024-09-13 DIAGNOSIS — M79.2 NEUROPATHIC PAIN: Chronic | ICD-10-CM

## 2024-09-13 RX ORDER — BUPROPION HYDROCHLORIDE 150 MG/1
150 TABLET ORAL EVERY MORNING
Qty: 90 TABLET | Refills: 3 | Status: SHIPPED | OUTPATIENT
Start: 2024-09-13 | End: 2025-09-13

## 2024-09-13 RX ORDER — BUPROPION HYDROCHLORIDE 300 MG/1
300 TABLET ORAL DAILY
Qty: 90 TABLET | Refills: 3 | Status: SHIPPED | OUTPATIENT
Start: 2024-09-13 | End: 2025-09-13

## 2024-09-13 RX ORDER — GABAPENTIN 300 MG/1
CAPSULE ORAL
Qty: 720 CAPSULE | Refills: 1 | Status: SHIPPED | OUTPATIENT
Start: 2024-09-13

## 2024-09-13 RX ORDER — RIZATRIPTAN BENZOATE 10 MG/1
TABLET ORAL
Qty: 12 TABLET | Refills: 3 | Status: SHIPPED | OUTPATIENT
Start: 2024-09-13

## 2024-09-13 NOTE — PROGRESS NOTES
"Chief Complaint    Multiple Sclerosis    Subjective        Christiane Figueroa presents to [facility] NEUROLOGY  History of Present Illness    64 y.o. female returns in follow up.  Last visit on 9/21/23 continued Aubagio, GBP, bupropion, ordered labs.      MRI Brain/cervical 9/23/21 as compared to 10/31/19 - no new/enlarging/enhancing lesions  right frontal, right centrum semiovale PVWM, scattered T2 lesions       6/13/24:  CBC, CMP - NCS     RRMS     MSFC stable      Tolerating Aubagio.      Gait veering to right.     Two HA a week.  IBU aborts.      Previous DMD:  Rebif, Tecfidera. Gilenya(macular edema).     MDD      Mood is stable     Wellbutrin 450 mg daily improved mood.      OAB     Controlled off meds.       Neuropathic pain      Leg pains are controlled on  mg and 1200 mg qhs           Objective   Vital Signs:  /78   Pulse 68   Ht 170.2 cm (67.01\")   Wt 96.6 kg (213 lb)   SpO2 99%   BMI 33.35 kg/m²   Estimated body mass index is 33.35 kg/m² as calculated from the following:    Height as of this encounter: 170.2 cm (67.01\").    Weight as of this encounter: 96.6 kg (213 lb).        Neurologic Exam     Mental Status   Oriented to person, place, and time.   Speech: speech is normal   Level of consciousness: alert  Knowledge: good and consistent with education.   Normal comprehension.     Cranial Nerves   Cranial nerves II through XII intact.     CN II   Visual fields full to confrontation.   Visual acuity: normal  Right visual field deficit: none  Left visual field deficit: none     CN III, IV, VI   Pupils are equal, round, and reactive to light.  Extraocular motions are normal.   Nystagmus: none   Diplopia: none  Ophthalmoparesis: none  Upgaze: normal  Downgaze: normal  Conjugate gaze: present    CN V   Facial sensation intact.   Right corneal reflex: normal  Left corneal reflex: normal    CN VII   Right facial weakness: none  Left facial weakness: none    CN VIII   Hearing: " intact    CN IX, X   Palate: symmetric  Right gag reflex: normal  Left gag reflex: normal    CN XI   Right sternocleidomastoid strength: normal  Left sternocleidomastoid strength: normal    CN XII   Tongue: not atrophic  Fasciculations: absent  Tongue deviation: none    Motor Exam   Muscle bulk: normal  Overall muscle tone: normal    Strength   Strength 5/5 throughout.     Sensory Exam   Light touch normal.     Gait, Coordination, and Reflexes     Gait  Gait: normal    Tremor   Resting tremor: absent  Intention tremor: absent  Action tremor: absent    Reflexes   Reflexes 2+ except as noted.      Physical Exam  Eyes:      Extraocular Movements: EOM normal.      Pupils: Pupils are equal, round, and reactive to light.   Neurological:      Mental Status: She is oriented to person, place, and time.      Cranial Nerves: Cranial nerves 2-12 are intact.      Motor: Motor strength is normal.     Gait: Gait is intact.   Psychiatric:         Speech: Speech normal.        Result Review :  The following data was reviewed by: Yousuf Zelaya MD on 09/13/2024:  Common labs          9/21/2023    11:43   Common Labs   Glucose 104    BUN 17    Creatinine 0.68    Sodium 141    Potassium 4.9    Chloride 102    Calcium 10.1    Total Protein 7.5    Albumin 4.8    Total Bilirubin <0.2    Alkaline Phosphatase 51    AST (SGOT) 22    ALT (SGPT) 20    WBC 4.74    Hemoglobin 14.1    Hematocrit 42.4    Platelets 192                Assessment and Plan   Diagnoses and all orders for this visit:    1. Multiple sclerosis (Primary)  Assessment & Plan:  MSFC stable     Continue Aubagio     Labs are stable.     Refer to PT for gait instability    Orders:  -     Ambulatory Referral to Physical Therapy for Evaluation & Treatment  -     gabapentin (NEURONTIN) 300 MG capsule; Take two capsules am and pm, and four capsules at bedtime  Dispense: 720 capsule; Refill: 1    2. Neuropathic pain    3. Recurrent major depressive disorder, in partial  remission  Assessment & Plan:  Controlled on Wellbutrin       Other orders  -     buPROPion XL (WELLBUTRIN XL) 150 MG 24 hr tablet; Take 1 tablet by mouth Every Morning. Take one tablet by mouth daily.  Dispense: 90 tablet; Refill: 3  -     buPROPion XL (WELLBUTRIN XL) 300 MG 24 hr tablet; Take 1 tablet by mouth Daily.  Dispense: 90 tablet; Refill: 3  -     rizatriptan (MAXALT) 10 MG tablet; Take one tablet as needed for headache. May repeat in 2 hours if needed.  Dispense: 12 tablet; Refill: 3             Follow Up   No follow-ups on file.  Patient was given instructions and counseling regarding her condition or for health maintenance advice. Please see specific information pulled into the AVS if appropriate.

## 2024-09-13 NOTE — PROGRESS NOTES
Specialty Pharmacy Patient Management Program  Neurology Reassessment     Christiane Figueroa is a 64 y.o. female with multiple sclerosis seen by a Neurology provider and enrolled in the Neurology Patient Management program offered by [facility].  A follow-up outreach was conducted, including assessment of continued therapy appropriateness, medication adherence, and side effect incidence and management for teriflunomide.     Changes to Insurance Coverage or Financial Support  No changes     Relevant Past Medical History and Comorbidities  Relevant medical history and concomitant health conditions were discussed with the patient. The patient's chart has been reviewed for relevant past medical history and comorbid health conditions and updated as necessary.   Past Medical History:   Diagnosis Date    Anxiety     Arthritis     Depression     Diabetes mellitus     Fatigue     Hypertension     Limb swelling     BLE, left leg worse    LOM (loss of memory)     Multiple sclerosis     Pain, joint, shoulder     Bilateral    Vision problems      Social History     Socioeconomic History    Marital status:    Tobacco Use    Smoking status: Never     Passive exposure: Never    Smokeless tobacco: Never   Vaping Use    Vaping status: Never Used   Substance and Sexual Activity    Alcohol use: No    Drug use: No    Sexual activity: Defer     Problem list reviewed by Melissa Carrasquillo, PharmD on 9/13/2024 at 12:26 PM    Hospitalizations and Urgent Care Since Last Assessment  ED Visits, Admissions, or Hospitalizations: none   Urgent Office Visits: none     Allergies  Known allergies and reactions were discussed with the patient. The patient's chart has been reviewed for allergy information and updated as necessary.   Allergies   Allergen Reactions    Adhesive Tape Rash    Latex Hives     Allergies reviewed by Melissa Carrasquillo, PharmD on 9/13/2024 at 12:26 PM    Relevant Laboratory Values  Common labs           9/21/2023    11:43   Common Labs   Glucose 104    BUN 17    Creatinine 0.68    Sodium 141    Potassium 4.9    Chloride 102    Calcium 10.1    Total Protein 7.5    Albumin 4.8    Total Bilirubin <0.2    Alkaline Phosphatase 51    AST (SGOT) 22    ALT (SGPT) 20    WBC 4.74    Hemoglobin 14.1    Hematocrit 42.4    Platelets 192        Lab Assessment  The above labs have been reviewed. No dose adjustments are needed for the specialty medication(s) based on the labs.     Current Medication List  This medication list has been reviewed with the patient and evaluated for any interactions or necessary modifications/recommendations, and updated to include all prescription medications, OTC medications, and supplements the patient is currently taking.  This list reflects what is contained in the patient's profile, which has also been marked as reviewed to communicate to other providers it is the most up to date version of the patient's current medication therapy.     Current Outpatient Medications:     Biotin 5 MG capsule, biotin  5,000 MCG 1 QD, Disp: , Rfl:     buPROPion XL (WELLBUTRIN XL) 150 MG 24 hr tablet, Take 1 tablet by mouth Every Morning. Take one tablet by mouth daily., Disp: 90 tablet, Rfl: 3    buPROPion XL (WELLBUTRIN XL) 300 MG 24 hr tablet, Take 1 tablet by mouth Daily., Disp: 90 tablet, Rfl: 3    estradiol (ESTRACE) 0.1 MG/GM vaginal cream, insert 1 gram VAGINALLY AT BEDTIME TWICE A WEEK (Patient not taking: Reported on 9/13/2024), Disp: , Rfl:     gabapentin (NEURONTIN) 300 MG capsule, Take two capsules am and pm, and four capsules at bedtime, Disp: 720 capsule, Rfl: 1    hydroCHLOROthiazide (HYDRODIURIL) 25 MG tablet, Take 1 tablet by mouth Daily., Disp: , Rfl:     losartan (COZAAR) 50 MG tablet, Take 1 tablet by mouth 2 (Two) Times a Day., Disp: , Rfl:     rizatriptan (MAXALT) 10 MG tablet, Take one tablet as needed for headache. May repeat in 2 hours if needed., Disp: 12 tablet, Rfl: 3    simvastatin  (ZOCOR) 20 MG tablet, Take 1 tablet by mouth Daily., Disp: , Rfl:     Teriflunomide 14 MG tablet, Take 1 tablet by mouth Daily., Disp: 90 tablet, Rfl: 3    vitamin D (ERGOCALCIFEROL) 1.25 MG (84610 UT) capsule capsule, Take 1 capsule by mouth Every 7 (Seven) Days., Disp: 12 capsule, Rfl: 3    Medicines reviewed by Melissa Carrasquillo, PharmD on 9/13/2024 at 12:26 PM    Drug Interactions  No relevant drug-drug interactions with specialty medication(s):  teriflunomide.        Adverse Drug Reactions  Medication tolerability: Tolerating with no to minimal ADRs          Medication plan: Continue therapy with normal follow-up  Plan for ADR Management: not required      Adherence, Self-Administration, and Current Therapy Problems  Adherence related patient's specialty therapy was discussed with the patient. The Adherence segment of this outreach has been reviewed and updated.   Adherence Questions  Linked Medication(s) Assessed: Teriflunomide  On average, how many doses/injections does the patient miss per month?: 0  What are the identified reasons for non-adherence or missed doses? : no problems identified  What is the estimated medication adherence level?: %  Based on the patient/caregiver response and refill history, does this patient require an MTP to track adherence improvements?: no    Additional Barriers to Patient Self-Administration: none  Methods for Supporting Patient Self-Administration: n/a    Recently Close Medication Therapy Problems  No medication therapy recommendations to display  Open Medication Therapy Problems  No medication therapy recommendations to display     Goals of Therapy  Goals related to the patient's specialty therapy was discussed with the patient. The Patient Goals segment of this outreach has been reviewed and updated.    Goals Addressed Today        Specialty Pharmacy General Goal      Reduction in relapse rate and signs and symptoms of relapsed ms.               Quality of Life  Assessment   Quality of Life related to the patient's enrollment in the patient management program and services provided was discussed with the patient. The QOL segment of this outreach has been reviewed and updated.   Quality of Life Improvement Scale: 8-Moderately better    Reassessment Plan & Follow-Up  Medication Therapy Changes: continue teriflunomide 14 mg po once daily  Related Plans, Therapy Recommendations, or Issues to Be Addressed: none  Pharmacist to perform regular reassessments no more than (6) months from the previous assessment.  Care Coordinator to set up future refill outreaches, coordinate prescription delivery, and escalate clinical questions to pharmacist.     Attestation  Therapeutic appropriateness: Appropriate  I attest the patient was actively involved in and has agreed to the above plan of care. If the prescribed therapy is at any point deemed not appropriate based on the current or future assessments, a consultation will be initiated with the patient's specialty care provider to determine the best course of action. The revised plan of therapy will be documented along with any additional patient education provided. Discussed aforementioned material with patient in person.    Melissa Carrasquillo, PharmD, [facility]  Clinic Specialty Pharmacist, Neurology  9/13/2024  12:27 EDT

## 2024-09-25 ENCOUNTER — SPECIALTY PHARMACY (OUTPATIENT)
Dept: ONCOLOGY | Facility: HOSPITAL | Age: 64
End: 2024-09-25
Payer: MEDICARE

## 2024-12-13 RX ORDER — ERGOCALCIFEROL 1.25 MG/1
50000 CAPSULE, LIQUID FILLED ORAL
Qty: 12 CAPSULE | Refills: 3 | Status: SHIPPED | OUTPATIENT
Start: 2024-12-13

## 2024-12-13 NOTE — TELEPHONE ENCOUNTER
Rx Refill Note  Requested Prescriptions     Pending Prescriptions Disp Refills    vitamin D (ERGOCALCIFEROL) 1.25 MG (39450 UT) capsule capsule 12 capsule 3     Sig: Take 1 capsule by mouth Every 7 (Seven) Days.      Last filled:  12/19/23 w/3  Last office visit with prescribing clinician: 9/13/2024      Next office visit with prescribing clinician: 3/13/2025     Jeane Leavitt MA  12/13/24, 08:02 EST

## 2024-12-17 ENCOUNTER — LAB (OUTPATIENT)
Dept: LAB | Facility: HOSPITAL | Age: 64
End: 2024-12-17
Payer: MEDICARE

## 2024-12-17 DIAGNOSIS — G35 MULTIPLE SCLEROSIS: ICD-10-CM

## 2024-12-17 LAB
ALBUMIN SERPL-MCNC: 4.2 G/DL (ref 3.5–5.2)
ALBUMIN/GLOB SERPL: 1.4 G/DL
ALP SERPL-CCNC: 53 U/L (ref 39–117)
ALT SERPL W P-5'-P-CCNC: 17 U/L (ref 1–33)
ANION GAP SERPL CALCULATED.3IONS-SCNC: 9 MMOL/L (ref 5–15)
AST SERPL-CCNC: 21 U/L (ref 1–32)
BASOPHILS # BLD AUTO: 0.05 10*3/MM3 (ref 0–0.2)
BASOPHILS NFR BLD AUTO: 1 % (ref 0–1.5)
BILIRUB SERPL-MCNC: 0.2 MG/DL (ref 0–1.2)
BUN SERPL-MCNC: 17 MG/DL (ref 8–23)
BUN/CREAT SERPL: 27.4 (ref 7–25)
CALCIUM SPEC-SCNC: 9.6 MG/DL (ref 8.6–10.5)
CHLORIDE SERPL-SCNC: 102 MMOL/L (ref 98–107)
CO2 SERPL-SCNC: 29 MMOL/L (ref 22–29)
CREAT SERPL-MCNC: 0.62 MG/DL (ref 0.57–1)
DEPRECATED RDW RBC AUTO: 42.6 FL (ref 37–54)
EGFRCR SERPLBLD CKD-EPI 2021: 99 ML/MIN/1.73
EOSINOPHIL # BLD AUTO: 0.18 10*3/MM3 (ref 0–0.4)
EOSINOPHIL NFR BLD AUTO: 3.5 % (ref 0.3–6.2)
ERYTHROCYTE [DISTWIDTH] IN BLOOD BY AUTOMATED COUNT: 12.5 % (ref 12.3–15.4)
GLOBULIN UR ELPH-MCNC: 3 GM/DL
GLUCOSE SERPL-MCNC: 112 MG/DL (ref 65–99)
HCT VFR BLD AUTO: 40.6 % (ref 34–46.6)
HGB BLD-MCNC: 13.5 G/DL (ref 12–15.9)
IMM GRANULOCYTES # BLD AUTO: 0.01 10*3/MM3 (ref 0–0.05)
IMM GRANULOCYTES NFR BLD AUTO: 0.2 % (ref 0–0.5)
LYMPHOCYTES # BLD AUTO: 1.51 10*3/MM3 (ref 0.7–3.1)
LYMPHOCYTES NFR BLD AUTO: 29.7 % (ref 19.6–45.3)
MCH RBC QN AUTO: 30.6 PG (ref 26.6–33)
MCHC RBC AUTO-ENTMCNC: 33.3 G/DL (ref 31.5–35.7)
MCV RBC AUTO: 92.1 FL (ref 79–97)
MONOCYTES # BLD AUTO: 0.63 10*3/MM3 (ref 0.1–0.9)
MONOCYTES NFR BLD AUTO: 12.4 % (ref 5–12)
NEUTROPHILS NFR BLD AUTO: 2.7 10*3/MM3 (ref 1.7–7)
NEUTROPHILS NFR BLD AUTO: 53.2 % (ref 42.7–76)
NRBC BLD AUTO-RTO: 0 /100 WBC (ref 0–0.2)
PLATELET # BLD AUTO: 193 10*3/MM3 (ref 140–450)
PMV BLD AUTO: 11.6 FL (ref 6–12)
POTASSIUM SERPL-SCNC: 4.6 MMOL/L (ref 3.5–5.2)
PROT SERPL-MCNC: 7.2 G/DL (ref 6–8.5)
RBC # BLD AUTO: 4.41 10*6/MM3 (ref 3.77–5.28)
SODIUM SERPL-SCNC: 140 MMOL/L (ref 136–145)
WBC NRBC COR # BLD AUTO: 5.08 10*3/MM3 (ref 3.4–10.8)

## 2024-12-17 PROCEDURE — 80053 COMPREHEN METABOLIC PANEL: CPT

## 2024-12-17 PROCEDURE — 85025 COMPLETE CBC W/AUTO DIFF WBC: CPT

## 2024-12-17 PROCEDURE — 36415 COLL VENOUS BLD VENIPUNCTURE: CPT

## 2024-12-19 ENCOUNTER — SPECIALTY PHARMACY (OUTPATIENT)
Dept: ONCOLOGY | Facility: HOSPITAL | Age: 64
End: 2024-12-19
Payer: MEDICARE

## 2024-12-19 NOTE — PROGRESS NOTES
Specialty Pharmacy Patient Management Program  [facility]      Christiane is a 64 y.o. female contacted today regarding refills of her medication(s).    Specialty medication(s) and dose(s) confirmed: teriflunomide  Other medications being refilled: none    Refill Questions      Flowsheet Row Most Recent Value   Changes to allergies? No   Changes to medications? No   New conditions or infections since last clinic visit No   Unplanned office visit, urgent care, ED, or hospital admission in the last 4 weeks  No   How does patient/caregiver feel medication is working? Good   Financial problems or insurance changes  No   Since the previous refill, were any specialty medication doses or scheduled injections missed or delayed?  No   If yes, please provide the amount N/A   Why were doses missed? N/A   Does this patient require a clinical escalation to a pharmacist? No            Delivery Questions      Flowsheet Row Most Recent Value   Delivery method UPS   Delivery address verified with patient/caregiver? Yes   Delivery address Home   Number of medications in delivery 1   Medication(s) being filled and delivered Teriflunomide   Doses left of specialty medications about 3 weeks   Copay verified? Yes   Copay amount $20.00   Copay form of payment Credit/debit on file   Ship Date 12/26   Delivery Date 12/27   Signature Required No                   Follow-up: 3 months     Melissa Carrasquillo, PharmD  Specialty Pharmacist  12/19/2024  14:39 EST

## 2025-03-05 ENCOUNTER — SPECIALTY PHARMACY (OUTPATIENT)
Dept: ONCOLOGY | Facility: HOSPITAL | Age: 65
End: 2025-03-05
Payer: MEDICARE

## 2025-03-05 NOTE — PROGRESS NOTES
Specialty Pharmacy Patient Management Program  Neurology Reassessment     Christiane Figueroa is a 64 y.o. female with multiple sclerosis seen by a Neurology provider and enrolled in the Neurology Patient Management program offered by [facility].  A follow-up outreach was conducted, including assessment of continued therapy appropriateness, medication adherence, and side effect incidence and management for teriflunomide 14 mg po daily.     Changes to Insurance Coverage or Financial Support  [employer] medicard part D     Relevant Past Medical History and Comorbidities  Relevant medical history and concomitant health conditions were discussed with the patient. The patient's chart has been reviewed for relevant past medical history and comorbid health conditions and updated as necessary.   Past Medical History:   Diagnosis Date    Anxiety     Arthritis     Depression     Diabetes mellitus     Fatigue     Hypertension     Limb swelling     BLE, left leg worse    LOM (loss of memory)     Multiple sclerosis     Pain, joint, shoulder     Bilateral    Vision problems      Social History     Socioeconomic History    Marital status:    Tobacco Use    Smoking status: Never     Passive exposure: Never    Smokeless tobacco: Never   Vaping Use    Vaping status: Never Used   Substance and Sexual Activity    Alcohol use: No    Drug use: No    Sexual activity: Defer     Problem list reviewed by Bertrand Cole, PharmD on 3/5/2025 at 11:28 AM    Hospitalizations and Urgent Care Since Last Assessment  ED Visits, Admissions, or Hospitalizations: none   Urgent Office Visits: none     Allergies  Known allergies and reactions were discussed with the patient. The patient's chart has been reviewed for allergy information and updated as necessary.   Allergies   Allergen Reactions    Adhesive Tape Rash    Latex Hives     Allergies reviewed by Bertrand Cole, PharmD on 3/5/2025 at 11:22 AM    Relevant  Laboratory Values  Common labs          12/17/2024    12:45   Common Labs   Glucose 112    BUN 17    Creatinine 0.62    Sodium 140    Potassium 4.6    Chloride 102    Calcium 9.6    Albumin 4.2    Total Bilirubin 0.2    Alkaline Phosphatase 53    AST (SGOT) 21    ALT (SGPT) 17    WBC 5.08    Hemoglobin 13.5    Hematocrit 40.6    Platelets 193        Lab Assessment   The above labs have been reviewed. No dose adjustments are needed for the specialty medication(s) based on the labs.     Current Medication List  This medication list has been reviewed with the patient and evaluated for any interactions or necessary modifications/recommendations, and updated to include all prescription medications, OTC medications, and supplements the patient is currently taking.  This list reflects what is contained in the patient's profile, which has also been marked as reviewed to communicate to other providers it is the most up to date version of the patient's current medication therapy.     Current Outpatient Medications:     Biotin 5 MG capsule, biotin  5,000 MCG 1 QD, Disp: , Rfl:     buPROPion XL (WELLBUTRIN XL) 150 MG 24 hr tablet, Take 1 tablet by mouth Every Morning. Take one tablet by mouth daily., Disp: 90 tablet, Rfl: 3    buPROPion XL (WELLBUTRIN XL) 300 MG 24 hr tablet, Take 1 tablet by mouth Daily., Disp: 90 tablet, Rfl: 3    gabapentin (NEURONTIN) 300 MG capsule, Take two capsules am and pm, and four capsules at bedtime, Disp: 720 capsule, Rfl: 1    hydroCHLOROthiazide (HYDRODIURIL) 25 MG tablet, Take 1 tablet by mouth Daily., Disp: , Rfl:     losartan (COZAAR) 50 MG tablet, Take 1 tablet by mouth 2 (Two) Times a Day., Disp: , Rfl:     rizatriptan (MAXALT) 10 MG tablet, Take one tablet as needed for headache. May repeat in 2 hours if needed., Disp: 12 tablet, Rfl: 3    simvastatin (ZOCOR) 20 MG tablet, Take 1 tablet by mouth Daily., Disp: , Rfl:     Teriflunomide 14 MG tablet, Take 1 tablet by mouth Daily., Disp: 90  tablet, Rfl: 3    vitamin D (ERGOCALCIFEROL) 1.25 MG (64518 UT) capsule capsule, Take 1 capsule by mouth Every 7 (Seven) Days., Disp: 12 capsule, Rfl: 3    estradiol (ESTRACE) 0.1 MG/GM vaginal cream, insert 1 gram VAGINALLY AT BEDTIME TWICE A WEEK (Patient not taking: Reported on 3/5/2025), Disp: , Rfl:     Medicines reviewed by Bertrand Cole, PharmD on 3/5/2025 at 11:24 AM    Drug Interactions  No relevant drug drug interactions with specialty medication.       Adverse Drug Reactions  Medication tolerability: Tolerating with no to minimal ADRs          Medication plan: Continue therapy with normal follow-up  Plan for ADR Management: n/a      Adherence, Self-Administration, and Current Therapy Problems  Adherence related patient's specialty therapy was discussed with the patient. The Adherence segment of this outreach has been reviewed and updated.   Adherence Questions  Linked Medication(s) Assessed: Teriflunomide  On average, how many doses/injections does the patient miss per month?: 0  What are the identified reasons for non-adherence or missed doses? : no problems identified  What is the estimated medication adherence level?: %  Based on the patient/caregiver response and refill history, does this patient require an MTP to track adherence improvements?: no    Additional Barriers to Patient Self-Administration: none  Methods for Supporting Patient Self-Administration: n/a    Recently Close Medication Therapy Problems  No medication therapy recommendations to display  Open Medication Therapy Problems  No medication therapy recommendations to display     Goals of Therapy  Goals related to the patient's specialty therapy was discussed with the patient. The Patient Goals segment of this outreach has been reviewed and updated.    Goals Addressed Today        Specialty Pharmacy General Goal      Reduction in relapse rate and signs and symptoms of relapsed ms.               Quality of Life Assessment    Quality of Life related to the patient's enrollment in the patient management program and services provided was discussed with the patient. The QOL segment of this outreach has been reviewed and updated.   Quality of Life Improvement Scale: 8-Moderately better    Reassessment Plan & Follow-Up  Medication Therapy Changes: n/a  Related Plans, Therapy Recommendations, or Issues to Be Addressed: n/a  Pharmacist to perform regular reassessments no more than (6) months from the previous assessment.  Care Coordinator to set up future refill outreaches, coordinate prescription delivery, and escalate clinical questions to pharmacist.     Attestation  Therapeutic appropriateness: Appropriate  I attest the patient was actively involved in and has agreed to the above plan of care. If the prescribed therapy is at any point deemed not appropriate based on the current or future assessments, a consultation will be initiated with the patient's specialty care provider to determine the best course of action. The revised plan of therapy will be documented along with any additional patient education provided. Discussed aforementioned material with patient by phone.    Bertrand Cole, PharmD, [facility]  Clinic Specialty Pharmacist, Neurology  3/5/2025  11:47 EST

## 2025-03-20 ENCOUNTER — SPECIALTY PHARMACY (OUTPATIENT)
Dept: ONCOLOGY | Facility: HOSPITAL | Age: 65
End: 2025-03-20
Payer: MEDICARE

## 2025-03-20 NOTE — PROGRESS NOTES
Specialty Pharmacy Patient Management Program  Refill Outreach     Christiane was contacted today regarding refills of their medication(s).    Refill Questions      Flowsheet Row Most Recent Value   Changes to allergies? No   Changes to medications? No   New conditions or infections since last clinic visit No   Unplanned office visit, urgent care, ED, or hospital admission in the last 4 weeks  No   How does patient/caregiver feel medication is working? Good   Financial problems or insurance changes  No   Since the previous refill, were any specialty medication doses or scheduled injections missed or delayed?  No   If yes, please provide the amount N/A   Why were doses missed? N/A   Does this patient require a clinical escalation to a pharmacist? No            Delivery Questions      Flowsheet Row Most Recent Value   Delivery method UPS   Delivery address verified with patient/caregiver? Yes   Delivery address Home   Number of medications in delivery 1   Medication(s) being filled and delivered Teriflunomide   Doses left of specialty medications Terifunomide 2 weeks left   Copay verified? Yes   Copay amount Teriflunomide =$0   Copay form of payment No copayment ($0)   Delivery Date Selection 04/04/25   Signature Required No                 Follow-up: 90 day(s)     Elisabeth Goncalves  3/20/2025  11:26 EDT

## 2025-03-25 RX ORDER — ERGOCALCIFEROL 1.25 MG/1
50000 CAPSULE, LIQUID FILLED ORAL
Qty: 12 CAPSULE | Refills: 3 | OUTPATIENT
Start: 2025-03-25

## 2025-03-25 NOTE — TELEPHONE ENCOUNTER
Rx Refill Note  Requested Prescriptions     Pending Prescriptions Disp Refills    vitamin D (ERGOCALCIFEROL) 1.25 MG (24633 UT) capsule capsule 12 capsule 3     Sig: Take 1 capsule by mouth Every 7 (Seven) Days.      Last filled: 12/13/2024, 12 with 3 refills  Last office visit with prescribing clinician: 9/13/2024      Next office visit with prescribing clinician: 6/10/2025     Roberta Redmond MA  03/25/25, 15:31 EDT

## 2025-06-25 ENCOUNTER — SPECIALTY PHARMACY (OUTPATIENT)
Dept: ONCOLOGY | Facility: HOSPITAL | Age: 65
End: 2025-06-25
Payer: MEDICARE

## 2025-06-25 NOTE — PROGRESS NOTES
Specialty Pharmacy Patient Management Program  Refill Outreach     Christiane was contacted today regarding refills of their medication(s).    Refill Questions      Flowsheet Row Most Recent Value   Changes to allergies? No   Changes to medications? No   New conditions or infections since last clinic visit No   Unplanned office visit, urgent care, ED, or hospital admission in the last 4 weeks  No   How does patient/caregiver feel medication is working? Good   Financial problems or insurance changes  No   Since the previous refill, were any specialty medication doses or scheduled injections missed or delayed?  No   If yes, please provide the amount N/A   Why were doses missed? N/A   Does this patient require a clinical escalation to a pharmacist? No            Delivery Questions      Flowsheet Row Most Recent Value   Delivery method UPS   Delivery address verified with patient/caregiver? Yes   Delivery address Home   Other address preferred N/A   Number of medications in delivery 1   Medication(s) being filled and delivered Teriflunomide   Doses left of specialty medications Teriflunomide 4 days left   Copay verified? Yes   Copay amount Terflunomide =$20.00 copay   Copay form of payment No copayment ($0)   Delivery Date Selection 06/26/25   Signature Required No   Do you consent to receive electronic handouts?  Yes                 Follow-up: 90 day(s)     Elisabeth Goncalves  6/25/2025  08:06 EDT

## 2025-08-28 ENCOUNTER — SPECIALTY PHARMACY (OUTPATIENT)
Dept: ONCOLOGY | Facility: HOSPITAL | Age: 65
End: 2025-08-28
Payer: MEDICARE